# Patient Record
Sex: MALE | Race: WHITE | NOT HISPANIC OR LATINO | Employment: FULL TIME | URBAN - NONMETROPOLITAN AREA
[De-identification: names, ages, dates, MRNs, and addresses within clinical notes are randomized per-mention and may not be internally consistent; named-entity substitution may affect disease eponyms.]

---

## 2019-05-12 ENCOUNTER — HOSPITAL ENCOUNTER (OUTPATIENT)
Dept: GENERAL RADIOLOGY | Facility: HOSPITAL | Age: 34
Discharge: HOME | End: 2019-05-12
Admitting: NURSE PRACTITIONER

## 2019-05-12 ENCOUNTER — OFFICE VISIT (OUTPATIENT)
Dept: RETAIL CLINIC | Facility: CLINIC | Age: 34
End: 2019-05-12

## 2019-05-12 ENCOUNTER — TELEPHONE (OUTPATIENT)
Dept: RETAIL CLINIC | Facility: CLINIC | Age: 34
End: 2019-05-12

## 2019-05-12 VITALS
BODY MASS INDEX: 42.62 KG/M2 | TEMPERATURE: 98.5 F | DIASTOLIC BLOOD PRESSURE: 76 MMHG | SYSTOLIC BLOOD PRESSURE: 120 MMHG | RESPIRATION RATE: 18 BRPM | HEART RATE: 96 BPM | OXYGEN SATURATION: 97 % | WEIGHT: 297 LBS

## 2019-05-12 DIAGNOSIS — S89.92XA KNEE INJURY, LEFT, INITIAL ENCOUNTER: Primary | ICD-10-CM

## 2019-05-12 DIAGNOSIS — S89.92XA KNEE INJURY, LEFT, INITIAL ENCOUNTER: ICD-10-CM

## 2019-05-12 PROCEDURE — 99213 OFFICE O/P EST LOW 20 MIN: CPT | Performed by: NURSE PRACTITIONER

## 2019-05-12 PROCEDURE — 73562 X-RAY EXAM OF KNEE 3: CPT

## 2019-05-12 NOTE — PROGRESS NOTES
Knee Injury      Subjective   Alen Rosales is a 34 y.o. male.     Knee Pain    Incident onset: Last night  The incident occurred at home. The injury mechanism was a fall (kicked up right leg in the air it buckled and then fell on left knee, causing knee cap dislocation ). The pain is present in the left knee. The patient is experiencing no pain. Pertinent negatives include no inability to bear weight. He reports no foreign bodies present. The symptoms are aggravated by movement and weight bearing. Treatments tried: Pushed knee cap back into place.   The treatment provided moderate relief.    Reports that he has minimal pain when walking but difficulty bearing weight due to limiting mobility.  See ROS.      There is no problem list on file for this patient.      No Known Allergies     Current Outpatient Medications on File Prior to Visit   Medication Sig Dispense Refill   • ibuprofen (ADVIL,MOTRIN) 200 MG tablet Take 200 mg by mouth Every 6 (Six) Hours As Needed for mild pain (1-3).     • [DISCONTINUED] amoxicillin-clavulanate (AUGMENTIN) 875-125 MG per tablet Take 1 tablet by mouth 2 (Two) Times a Day. 20 tablet 0   • [DISCONTINUED] azithromycin (ZITHROMAX) 250 MG tablet Take 1 tablet by mouth Daily. Take 2 tablets the first day, then 1 tablet daily for 4 days. 6 tablet 0   • [DISCONTINUED] benzonatate (TESSALON) 200 MG capsule Take 1 capsule by mouth 3 (Three) Times a Day As Needed for cough. 15 capsule 3     No current facility-administered medications on file prior to visit.        Past Medical History:   Diagnosis Date   • Patient denies medical problems        Past Surgical History:   Procedure Laterality Date   • NO PAST SURGERIES         Family History   Problem Relation Age of Onset   • Hypertension Mother    • Diabetes Mother    • Lung cancer Father    • Coronary artery disease Father    • Hyperlipidemia Father    • Hypertension Father    • Drug abuse Brother    • Hypertension Brother        Social History      Socioeconomic History   • Marital status: Single     Spouse name: Not on file   • Number of children: Not on file   • Years of education: Not on file   • Highest education level: Not on file   Tobacco Use   • Smoking status: Never Smoker   • Smokeless tobacco: Never Used   Substance and Sexual Activity   • Alcohol use: Yes     Alcohol/week: 0.6 oz     Types: 1 Shots of liquor per week   • Drug use: No   • Sexual activity: Defer       Travel:  No recent travel within the last 21 days outside the [place] Denies recent travel to one of the following [place]  Countries:  [place], [place], [place], or [place].  Denies contact with anyone who has traveled to one of the following [place]  Countries: [place], [place], [place], or [place] within the last 21 days and is known or suspected to have Ebola.  Denies having had any contact with the human remains, blood or any bodily fluids of someone who is known or suspected to have Ebola within the last 21 days.     Review of Systems   Constitutional: Negative.    HENT: Negative.    Respiratory: Negative.    Cardiovascular: Negative.    Gastrointestinal: Negative.    Musculoskeletal: Positive for arthralgias (left knee ), gait problem and joint swelling (left knee).   Skin: Negative for wound.   Neurological: Negative for dizziness, light-headedness and headaches.        No head injury        /76 (BP Location: Right arm, Patient Position: Sitting, Cuff Size: Large Adult)   Pulse 96   Temp 98.5 °F (36.9 °C) (Temporal)   Resp 18   Wt 135 kg (297 lb)   SpO2 97%   BMI 42.62 kg/m²     Objective   Physical Exam   Constitutional: He is oriented to person, place, and time. He appears well-developed and well-nourished. He is cooperative. He does not appear ill. No distress.   HENT:   Head: Normocephalic.   Cardiovascular: Normal rate, regular rhythm, normal heart sounds, intact distal pulses and normal pulses.   Pulses:       Dorsalis pedis pulses are 2+ on the  right side, and 2+ on the left side.        Posterior tibial pulses are 2+ on the right side, and 2+ on the left side.   Pulses intact bilateral lower extremities, feet cool bilaterally    Pulmonary/Chest: Effort normal and breath sounds normal. No stridor. He has no decreased breath sounds. He has no wheezes. He has no rhonchi. He has no rales.   Musculoskeletal:        Left knee: He exhibits decreased range of motion and swelling (trace ). He exhibits no ecchymosis, no deformity, no laceration, no erythema, normal patellar mobility and no bony tenderness. Tenderness found. Medial joint line and MCL tenderness noted.        Legs:  Positive for pain with [facility] test left knee, no clicking or clunking felt/heard    Neurological: He is alert and oriented to person, place, and time. He has normal strength. He exhibits normal muscle tone. Gait (favoring right leg ) abnormal.   Skin: Skin is warm, dry and intact. No rash noted.       Assessment/Plan   Alen was seen today for knee injury.    Diagnoses and all orders for this visit:    Knee injury, left, initial encounter  -     Cancel: XR Knee 1 or 2 View Left; Future    Advised patient to rest, use Ice today, compression and elevation.  Will call when xray results become available.  I'm concerned about an meniscal tear.  Advised patient to follow up with PCP for referral to orthopedist tomorrow.  If pain worsens please see ER care.  Patient verbalized understanding of instructions given today.  Visit summary provided.  Questions/concerns addressed.        Return with PCP tomorrow as discussed and/or ER if pain worsens.  .

## 2019-05-12 NOTE — TELEPHONE ENCOUNTER
Called patient with preliminary xray examination results: unremarkable left knee xray.  Advised him to contact PCP for referral to orthopedics in the morning.

## 2019-05-12 NOTE — PATIENT INSTRUCTIONS
Knee Dislocation  Knee dislocation is when the bones that make up the knee joint move out of their normal position. Usually, if your knee is dislocated, at least two of the strong cords that hold your bones in place (ligaments) are also damaged. Knee dislocation is often caused by a sports accident or a car accident.  This is a serious injury. Your doctor needs to put the knee bones back in place right away. This may be done with or without surgery.  Follow these instructions at home:  If you have a splint:  · Wear it as told by your doctor.  · Take it off only as told by your doctor.  · Loosen it if:  ? Your foot or toes become numb and tingle.  ? Your foot or toes turn cold and blue.  · Keep it clean and dry.  Bathing  · Do not take baths, swim, or use a hot tub until your doctor says that you can. Ask your doctor if you can take showers. You may only be allowed to take sponge baths.  · If your doctor says that taking baths and showers is okay, cover the splint with a plastic bag. Do not let the splint get wet.  Managing pain, stiffness, and swelling  · If told, put ice on the injured area.  ? Put ice in a plastic bag.  ? Place a towel between your skin and the bag.  ? Leave the ice on for 20 minutes, 2-3 times per day.  · Move your toes often to avoid stiffness and to lessen swelling.  · Raise (elevate) the injured area above the level of your heart while you are sitting or lying down.  Activity  · Do not use the injured leg to support your body weight until your doctor says that you can. Use crutches or other devices to help you walk as told by your doctor.  · Return to your normal activities as told by your doctor. Ask your doctor what activities are safe for you.  · Avoid hard (strenuous) activities for as long as told by your doctor.  General instructions  · Take over-the-counter and prescription medicines only as told by your doctor.  · Do not drive or use heavy machinery while taking prescription pain  medicine.  Contact a doctor if:  · Your pain becomes worse, not better.  Get help right away if:  · You have very bad pain.  · You begin to lose feeling in your foot, and adjusting your splint does not help.  · You cannot move your ankle or toes.  · Your foot or ankle turns color or feels cold.  · You have chest pain or shortness of breath.  This information is not intended to replace advice given to you by your health care provider. Make sure you discuss any questions you have with your health care provider.  Document Released: 08/29/2012 Document Revised: 06/07/2018 Document Reviewed: 04/11/2016  [employer] Interactive Patient Education © 2019 [employer]

## 2019-05-13 ENCOUNTER — OFFICE VISIT (OUTPATIENT)
Dept: ORTHOPEDIC SURGERY | Facility: CLINIC | Age: 34
End: 2019-05-13

## 2019-05-13 VITALS — WEIGHT: 302 LBS | BODY MASS INDEX: 43.23 KG/M2 | RESPIRATION RATE: 18 BRPM | HEIGHT: 70 IN

## 2019-05-13 DIAGNOSIS — S83.005A PATELLAR DISLOCATION, LEFT, INITIAL ENCOUNTER: Primary | ICD-10-CM

## 2019-05-13 PROCEDURE — 99203 OFFICE O/P NEW LOW 30 MIN: CPT | Performed by: ORTHOPAEDIC SURGERY

## 2019-05-13 RX ORDER — MELOXICAM 15 MG/1
15 TABLET ORAL DAILY
Qty: 30 TABLET | Refills: 1 | Status: SHIPPED | OUTPATIENT
Start: 2019-05-13 | End: 2019-12-16

## 2019-05-13 NOTE — PROGRESS NOTES
Subjective   Patient ID: Alen Rosales is a 34 y.o. male  Pain of the Left Knee (Patient states he fell at a friends house on 05/11/19, he states he noticed when he fell that his knee cap was more on the left side so he pushed it back. He states he went to [facility] urgent care yesterday and was told there was no fracture.)             History of Present Illness  Presents with pain swelling and tenderness to the anterior aspect left knee after attempting a kick maneuver felt his patella go out of place he fell on the floor at the manually reduce it ever since has had pain and swelling anterolaterally at the knee also pain to medial patellofemoral area.  Denies history of prior giving way locking or other mechanical issues, no current hip back pain or other paresthesias.  Has been amatory with use of a crutch initial x-rays were negative for fracture but suspicion was raised as to soft tissue injury.      Review of Systems   Constitutional: Negative for fever.   HENT: Negative for voice change.    Eyes: Negative for visual disturbance.   Respiratory: Negative for shortness of breath.    Cardiovascular: Negative for chest pain.   Gastrointestinal: Negative for abdominal distention and abdominal pain.   Genitourinary: Negative for dysuria.   Musculoskeletal: Positive for arthralgias. Negative for gait problem and joint swelling.   Skin: Negative for rash.   Neurological: Negative for speech difficulty.   Hematological: Does not bruise/bleed easily.   Psychiatric/Behavioral: Negative for confusion.       Past Medical History:   Diagnosis Date   • Patient denies medical problems         Past Surgical History:   Procedure Laterality Date   • NO PAST SURGERIES         Family History   Problem Relation Age of Onset   • Hypertension Mother    • Diabetes Mother    • Lung cancer Father    • Coronary artery disease Father    • Hyperlipidemia Father    • Hypertension Father    • Drug abuse Brother    • Hypertension Brother        Social  "History     Socioeconomic History   • Marital status:      Spouse name: Not on file   • Number of children: Not on file   • Years of education: Not on file   • Highest education level: Not on file   Tobacco Use   • Smoking status: Never Smoker   • Smokeless tobacco: Never Used   Substance and Sexual Activity   • Alcohol use: Yes     Alcohol/week: 0.6 oz     Types: 1 Shots of liquor per week   • Drug use: No   • Sexual activity: Defer       I have reviewed all of the above social hx, family hx, surgical hx, medications, allergies & ROS and confirm that it is accurate.    No Known Allergies      Current Outpatient Medications:   •  meloxicam (MOBIC) 15 MG tablet, Take 1 tablet by mouth Daily., Disp: 30 tablet, Rfl: 1    Objective   Resp 18   Ht 177.8 cm (70\")   Wt (!) 137 kg (302 lb)   BMI 43.33 kg/m²    Physical Exam  Constitutional: Patient is oriented to person, place, and time. Patient appears well-developed and well-nourished.   HENT:Head: Normocephalic and atraumatic.   Eyes: EOM are normal. Pupils are equal, round, and reactive to light.   Neck: Normal range of motion. Neck supple.   Cardiovascular: Normal rate.    Pulmonary/Chest: Effort normal and breath sounds normal.   Abdominal: Soft.   Neurological: Patient is alert and oriented to person, place, and time.   Skin: Skin is warm and dry.   Psychiatric: Patient has a normal mood and affect.   Nursing note and vitals reviewed.       [unfilled]   Left knee: 3+ effusion positive tenderness over the MPFL region on the medial aspect of the patella but no ecchymosis, extensor mechanism intact full active extension no tenderness at the quad or patellar tendon areas, minimal lateral patellofemoral pain, negative Lockman sign negative Remy sign calf supple no edema in the ankle and no pain with internal ex rotation of the left hip    Assessment/Plan   Review of Radiographic Studies:    Radiographic images today of affected area I personally viewed and " showed no sign of acute fracture or dislocation.  No new imaging done today.      Procedures     Alen was seen today for pain.    Diagnoses and all orders for this visit:    Patellar dislocation, left, initial encounter  -     Ambulatory Referral to Physical Therapy Evaluate and treat    Other orders  -     meloxicam (MOBIC) 15 MG tablet; Take 1 tablet by mouth Daily.       Physical therapy referral given and Use brace as instructed      Recommendations/Plan:   Work/Activity Status: Unable to return to work until next evaluation    Patient agreeable to call or return sooner for any concerns.             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

## 2019-05-23 ENCOUNTER — OFFICE VISIT (OUTPATIENT)
Dept: ORTHOPEDIC SURGERY | Facility: CLINIC | Age: 34
End: 2019-05-23

## 2019-05-23 VITALS — WEIGHT: 302 LBS | HEIGHT: 70 IN | RESPIRATION RATE: 18 BRPM | BODY MASS INDEX: 43.23 KG/M2

## 2019-05-23 DIAGNOSIS — S83.005D PATELLAR DISLOCATION, LEFT, SUBSEQUENT ENCOUNTER: Primary | ICD-10-CM

## 2019-05-23 PROCEDURE — 99213 OFFICE O/P EST LOW 20 MIN: CPT | Performed by: ORTHOPAEDIC SURGERY

## 2019-05-23 NOTE — PROGRESS NOTES
Subjective   Patient ID: Alen Rosales is a 34 y.o. male  Follow-up of the Left Knee (Patient is here today for a follow up on his left knee, he states he is still doing therapy and having trouble squatting, but states he is feeling better. His pain level is 3-4/10.)             History of Present Illness  Overall pain level much improved had one episode earlier this week when he was getting in to his wife's car when he caught his foot on the floor mat felt some pain swelling occurred now is back to his baseline prior to that episode, feels relief using his bracing has been compliant with therapy taking his anti-inflammatory less often as the pain has subsided.      Review of Systems   Constitutional: Negative for fever.   HENT: Negative for voice change.    Eyes: Negative for visual disturbance.   Respiratory: Negative for shortness of breath.    Cardiovascular: Negative for chest pain.   Gastrointestinal: Negative for abdominal pain.   Genitourinary: Negative for dysuria.   Musculoskeletal: Positive for arthralgias. Negative for gait problem and joint swelling.   Skin: Negative for rash.   Neurological: Negative for speech difficulty.   Hematological: Does not bruise/bleed easily.   Psychiatric/Behavioral: Negative for confusion.       Past Medical History:   Diagnosis Date   • Patient denies medical problems         Past Surgical History:   Procedure Laterality Date   • NO PAST SURGERIES         Family History   Problem Relation Age of Onset   • Hypertension Mother    • Diabetes Mother    • Lung cancer Father    • Coronary artery disease Father    • Hyperlipidemia Father    • Hypertension Father    • Drug abuse Brother    • Hypertension Brother        Social History     Socioeconomic History   • Marital status:      Spouse name: Not on file   • Number of children: Not on file   • Years of education: Not on file   • Highest education level: Not on file   Tobacco Use   • Smoking status: Never Smoker   •  "Smokeless tobacco: Never Used   Substance and Sexual Activity   • Alcohol use: Yes     Alcohol/week: 0.6 oz     Types: 1 Shots of liquor per week   • Drug use: No   • Sexual activity: Defer       I have reviewed all of the above social hx, family hx, surgical hx, medications, allergies & ROS and confirm that it is accurate.    No Known Allergies      Current Outpatient Medications:   •  meloxicam (MOBIC) 15 MG tablet, Take 1 tablet by mouth Daily., Disp: 30 tablet, Rfl: 1    Objective   Resp 18   Ht 177.8 cm (70\")   Wt (!) 137 kg (302 lb)   BMI 43.33 kg/m²    Physical Exam  Constitutional: Patient is oriented to person, place, and time. Patient appears well-developed and well-nourished.   HENT:Head: Normocephalic and atraumatic.   Eyes: EOM are normal. Pupils are equal, round, and reactive to light.   Neck: Normal range of motion. Neck supple.   Cardiovascular: Normal rate.    Pulmonary/Chest: Effort normal and breath sounds normal.   Abdominal: Soft.   Neurological: Patient is alert and oriented to person, place, and time.   Skin: Skin is warm and dry.   Psychiatric: Patient has a normal mood and affect.   Nursing note and vitals reviewed.       [unfilled]   Left knee: 1-2+ effusion slight tenderness over the lateral patellofemoral area full active knee extension no medial lateral joint line pain neurovascularly is intact  Assessment/Plan   Review of Radiographic Studies:    No new imaging done today.      Procedures     Alen was seen today for follow-up.    Diagnoses and all orders for this visit:    Patellar dislocation, left, subsequent encounter       Physical therapy referral given      Recommendations/Plan:   Work/Activity Status: Unable to return to work until next evaluation    Patient agreeable to call or return sooner for any concerns.             Impression:  Improving left anterior knee pain status post patellar dislocation  Plan:  Continue therapy icing use of brace recheck 2 weeks consider " release to work at [employer] at that time

## 2019-05-31 ENCOUNTER — TELEPHONE (OUTPATIENT)
Dept: ORTHOPEDIC SURGERY | Facility: CLINIC | Age: 34
End: 2019-05-31

## 2019-05-31 NOTE — TELEPHONE ENCOUNTER
Spoke with patients wife to let her know his papers for work was ready to be picked up and that there was a fee to be paid.

## 2019-06-06 ENCOUNTER — OFFICE VISIT (OUTPATIENT)
Dept: ORTHOPEDIC SURGERY | Facility: CLINIC | Age: 34
End: 2019-06-06

## 2019-06-06 VITALS — HEIGHT: 70 IN | BODY MASS INDEX: 43.23 KG/M2 | WEIGHT: 302 LBS | RESPIRATION RATE: 18 BRPM

## 2019-06-06 DIAGNOSIS — S83.005D PATELLAR DISLOCATION, LEFT, SUBSEQUENT ENCOUNTER: Primary | ICD-10-CM

## 2019-06-06 PROCEDURE — 99213 OFFICE O/P EST LOW 20 MIN: CPT | Performed by: ORTHOPAEDIC SURGERY

## 2019-06-06 NOTE — PROGRESS NOTES
Subjective   Patient ID: Alen Rosales is a 34 y.o. male  Follow-up and Pain of the Left Knee (Patient is here today for a follow up on his knee pain, he states his knee pain is more noticeable at night but comes and goes. Patient also states his knee is feeling better and feels more stable.)             History of Present Illness  He returns very satisfied with relief of his left anterior knee pain only has rarely occasional popping with pain inferolateral patellofemoral area.  No falling no giving way no locking or buckling.  He uses his knee sleeve is eager to go back to his full-time work at [employer].      Review of Systems   Constitutional: Negative for fever.   HENT: Negative for voice change.    Eyes: Negative for visual disturbance.   Respiratory: Negative for shortness of breath.    Cardiovascular: Negative for chest pain.   Gastrointestinal: Negative for abdominal pain.   Genitourinary: Negative for dysuria.   Musculoskeletal: Positive for arthralgias. Negative for gait problem and joint swelling.   Skin: Negative for rash.   Neurological: Negative for speech difficulty.   Hematological: Does not bruise/bleed easily.   Psychiatric/Behavioral: Negative for confusion.       Past Medical History:   Diagnosis Date   • Patient denies medical problems         Past Surgical History:   Procedure Laterality Date   • NO PAST SURGERIES         Family History   Problem Relation Age of Onset   • Hypertension Mother    • Diabetes Mother    • Lung cancer Father    • Coronary artery disease Father    • Hyperlipidemia Father    • Hypertension Father    • Drug abuse Brother    • Hypertension Brother        Social History     Socioeconomic History   • Marital status:      Spouse name: Not on file   • Number of children: Not on file   • Years of education: Not on file   • Highest education level: Not on file   Tobacco Use   • Smoking status: Never Smoker   • Smokeless tobacco: Never Used   Substance and Sexual Activity  "  • Alcohol use: Yes     Alcohol/week: 0.6 oz     Types: 1 Shots of liquor per week   • Drug use: No   • Sexual activity: Defer       I have reviewed all of the above social hx, family hx, surgical hx, medications, allergies & ROS and confirm that it is accurate.    No Known Allergies      Current Outpatient Medications:   •  meloxicam (MOBIC) 15 MG tablet, Take 1 tablet by mouth Daily., Disp: 30 tablet, Rfl: 1    Objective   Resp 18   Ht 177.8 cm (70\")   Wt (!) 137 kg (302 lb)   BMI 43.33 kg/m²    Physical Exam  Constitutional: Patient is oriented to person, place, and time. Patient appears well-developed and well-nourished.   HENT:Head: Normocephalic and atraumatic.   Eyes: EOM are normal. Pupils are equal, round, and reactive to light.   Neck: Normal range of motion. Neck supple.   Cardiovascular: Normal rate.    Pulmonary/Chest: Effort normal and breath sounds normal.   Abdominal: Soft.   Neurological: Patient is alert and oriented to person, place, and time.   Skin: Skin is warm and dry.   Psychiatric: Patient has a normal mood and affect.   Nursing note and vitals reviewed.       [unfilled]   Left knee: Trace effusion minimal patellofemoral crepitus some tenderness at the inferolateral patellar pole good patellar mobility with some pain with medial translation but does not appear excessively tight in the lateral retinacular area.  Lateral joint line nontender Remy sign negative for lateral joint line pain, range of motion is full strength normal no instability.  Assessment/Plan   Review of Radiographic Studies:    No new imaging done today.      Procedures     Alen was seen today for follow-up and pain.    Diagnoses and all orders for this visit:    Patellar dislocation, left, subsequent encounter       Work status form completed and provided to patient      Recommendations/Plan:   Work/Activity Status: May perform usual activities as tolerated    Patient agreeable to call or return sooner for any " concerns.             Impression:  History of patellar dislocation with improved left knee anterior patellofemoral mechanics less pain slight swelling inferolateral patellofemoral area  Plan:  Home exercise released to regular work duty recheck in 2 to 3 months if still symptomatic order MRI at that time

## 2019-12-16 ENCOUNTER — OFFICE VISIT (OUTPATIENT)
Dept: RETAIL CLINIC | Facility: CLINIC | Age: 34
End: 2019-12-16

## 2019-12-16 VITALS
WEIGHT: 307 LBS | RESPIRATION RATE: 18 BRPM | SYSTOLIC BLOOD PRESSURE: 122 MMHG | DIASTOLIC BLOOD PRESSURE: 70 MMHG | OXYGEN SATURATION: 98 % | TEMPERATURE: 97.1 F | HEART RATE: 83 BPM | BODY MASS INDEX: 44.05 KG/M2

## 2019-12-16 DIAGNOSIS — J01.40 ACUTE NON-RECURRENT PANSINUSITIS: Primary | ICD-10-CM

## 2019-12-16 PROCEDURE — 99213 OFFICE O/P EST LOW 20 MIN: CPT | Performed by: NURSE PRACTITIONER

## 2019-12-16 RX ORDER — AMOXICILLIN AND CLAVULANATE POTASSIUM 875; 125 MG/1; MG/1
1 TABLET, FILM COATED ORAL 2 TIMES DAILY
Qty: 20 TABLET | Refills: 0 | Status: SHIPPED | OUTPATIENT
Start: 2019-12-16 | End: 2019-12-26

## 2019-12-16 RX ORDER — BROMPHENIRAMINE MALEATE, PSEUDOEPHEDRINE HYDROCHLORIDE, AND DEXTROMETHORPHAN HYDROBROMIDE 2; 30; 10 MG/5ML; MG/5ML; MG/5ML
10 SYRUP ORAL 4 TIMES DAILY PRN
Qty: 150 ML | Refills: 0 | Status: SHIPPED | OUTPATIENT
Start: 2019-12-16 | End: 2019-12-21

## 2019-12-16 RX ORDER — FLUTICASONE PROPIONATE 50 MCG
1 SPRAY, SUSPENSION (ML) NASAL DAILY
Qty: 1 BOTTLE | Refills: 1 | Status: SHIPPED | OUTPATIENT
Start: 2019-12-16 | End: 2019-12-30

## 2019-12-16 NOTE — PATIENT INSTRUCTIONS
Sinusitis, Adult  Sinusitis is soreness and swelling (inflammation) of your sinuses. Sinuses are hollow spaces in the bones around your face. They are located:  · Around your eyes.  · In the middle of your forehead.  · Behind your nose.  · In your cheekbones.  Your sinuses and nasal passages are lined with a fluid called mucus. Mucus drains out of your sinuses. Swelling can trap mucus in your sinuses. This lets germs (bacteria, virus, or fungus) grow, which leads to infection. Most of the time, this condition is caused by a virus.  What are the causes?  This condition is caused by:  · Allergies.  · Asthma.  · Germs.  · Things that block your nose or sinuses.  · Growths in the nose (nasal polyps).  · Chemicals or irritants in the air.  · Fungus (rare).  What increases the risk?  You are more likely to develop this condition if:  · You have a weak body defense system (immune system).  · You do a lot of swimming or diving.  · You use nasal sprays too much.  · You smoke.  What are the signs or symptoms?  The main symptoms of this condition are pain and a feeling of pressure around the sinuses. Other symptoms include:  · Stuffy nose (congestion).  · Runny nose (drainage).  · Swelling and warmth in the sinuses.  · Headache.  · Toothache.  · A cough that may get worse at night.  · Mucus that collects in the throat or the back of the nose (postnasal drip).  · Being unable to smell and taste.  · Being very tired (fatigue).  · A fever.  · Sore throat.  · Bad breath.  How is this diagnosed?  This condition is diagnosed based on:  · Your symptoms.  · Your medical history.  · A physical exam.  · Tests to find out if your condition is short-term (acute) or long-term (chronic). Your doctor may:  ? Check your nose for growths (polyps).  ? Check your sinuses using a tool that has a light (endoscope).  ? Check for allergies or germs.  ? Do imaging tests, such as an MRI or CT scan.  How is this treated?  Treatment for this condition  depends on the cause and whether it is short-term or long-term.  · If caused by a virus, your symptoms should go away on their own within 10 days. You may be given medicines to relieve symptoms. They include:  ? Medicines that shrink swollen tissue in the nose.  ? Medicines that treat allergies (antihistamines).  ? A spray that treats swelling of the nostrils.   ? Rinses that help get rid of thick mucus in your nose (nasal saline washes).  · If caused by bacteria, your doctor may wait to see if you will get better without treatment. You may be given antibiotic medicine if you have:  ? A very bad infection.  ? A weak body defense system.  · If caused by growths in the nose, you may need to have surgery.  Follow these instructions at home:  Medicines  · Take, use, or apply over-the-counter and prescription medicines only as told by your doctor. These may include nasal sprays.  · If you were prescribed an antibiotic medicine, take it as told by your doctor. Do not stop taking the antibiotic even if you start to feel better.  Hydrate and humidify    · Drink enough water to keep your pee (urine) pale yellow.  · Use a cool mist humidifier to keep the humidity level in your home above 50%.  · Breathe in steam for 10-15 minutes, 3-4 times a day, or as told by your doctor. You can do this in the bathroom while a hot shower is running.  · Try not to spend time in cool or dry air.  Rest  · Rest as much as you can.  · Sleep with your head raised (elevated).  · Make sure you get enough sleep each night.  General instructions    · Put a warm, moist washcloth on your face 3-4 times a day, or as often as told by your doctor. This will help with discomfort.  · Wash your hands often with soap and water. If there is no soap and water, use hand .  · Do not smoke. Avoid being around people who are smoking (secondhand smoke).  · Keep all follow-up visits as told by your doctor. This is important.  Contact a doctor if:  · You  have a fever.  · Your symptoms get worse.  · Your symptoms do not get better within 10 days.  Get help right away if:  · You have a very bad headache.  · You cannot stop throwing up (vomiting).  · You have very bad pain or swelling around your face or eyes.  · You have trouble seeing.  · You feel confused.  · Your neck is stiff.  · You have trouble breathing.  Summary  · Sinusitis is swelling of your sinuses. Sinuses are hollow spaces in the bones around your face.  · This condition is caused by tissues in your nose that become inflamed or swollen. This traps germs. These can lead to infection.  · If you were prescribed an antibiotic medicine, take it as told by your doctor. Do not stop taking it even if you start to feel better.  · Keep all follow-up visits as told by your doctor. This is important.  This information is not intended to replace advice given to you by your health care provider. Make sure you discuss any questions you have with your health care provider.  Document Released: 06/05/2009 Document Revised: 05/20/2019 Document Reviewed: 05/20/2019  [employer] Patient Education © 2019 [employer]

## 2019-12-16 NOTE — PROGRESS NOTES
URI      Subjective   Alen Rosales is a 34 y.o. male.     URI    This is a new problem. Episode onset: Started Wednesday worsened Saturday  The problem has been gradually worsening. There has been no fever. Associated symptoms include congestion, coughing, headaches, a rash (itchy under nose from blowing ), rhinorrhea, sinus pain, a sore throat (mildly improved ) and wheezing. Pertinent negatives include no abdominal pain, chest pain, diarrhea, ear pain, nausea, sneezing or vomiting. Treatments tried: Mucinex, sleeping inclined, Tylenol  The treatment provided mild relief.    Has not had influenza vaccine.  Career:  [employer].  See ROS.      There is no problem list on file for this patient.      No Known Allergies     Current Outpatient Medications on File Prior to Visit   Medication Sig Dispense Refill   • [DISCONTINUED] meloxicam (MOBIC) 15 MG tablet Take 1 tablet by mouth Daily. 30 tablet 1     No current facility-administered medications on file prior to visit.        Past Medical History:   Diagnosis Date   • Patient denies medical problems        Past Surgical History:   Procedure Laterality Date   • NO PAST SURGERIES         Family History   Problem Relation Age of Onset   • Hypertension Mother    • Diabetes Mother    • Lung cancer Father    • Coronary artery disease Father    • Hyperlipidemia Father    • Hypertension Father    • Drug abuse Brother    • Hypertension Brother        Social History     Socioeconomic History   • Marital status:      Spouse name: Not on file   • Number of children: Not on file   • Years of education: Not on file   • Highest education level: Not on file   Tobacco Use   • Smoking status: Never Smoker   • Smokeless tobacco: Never Used   Substance and Sexual Activity   • Alcohol use: Yes     Alcohol/week: 1.0 standard drinks     Types: 1 Shots of liquor per week   • Drug use: Never   • Sexual activity: Defer       Travel:  No recent travel within the last 21  days outside the [place] Denies recent travel to one of the following [place]  Countries:  [place], [place], [place], or [place].  Denies contact with anyone who has traveled to one of the following West  Countries: [place], [place], [place], or [place] within the last 21 days and is known or suspected to have Ebola.  Denies having had any contact with the human remains, blood or any bodily fluids of someone who is known or suspected to have Ebola within the last 21 days.     Review of Systems   Constitutional: Positive for diaphoresis and fatigue. Negative for chills and fever.   HENT: Positive for congestion, dental problem (since illness began ), nosebleeds (when blowing ), postnasal drip, rhinorrhea, sinus pressure, sinus pain and sore throat (mildly improved ). Negative for ear discharge, ear pain, mouth sores, sneezing and voice change.    Respiratory: Positive for cough and wheezing. Negative for chest tightness and shortness of breath.    Cardiovascular: Negative for chest pain.   Gastrointestinal: Negative for abdominal pain, diarrhea, nausea and vomiting.   Musculoskeletal: Negative for myalgias.   Skin: Positive for rash (itchy under nose from blowing ).   Neurological: Positive for headaches. Negative for dizziness.       /70 (BP Location: Right arm, Patient Position: Sitting, Cuff Size: Large Adult)   Pulse 83   Temp 97.1 °F (36.2 °C) (Temporal)   Resp 18   Wt (!) 139 kg (307 lb)   SpO2 98%   BMI 44.05 kg/m²     Objective   Physical Exam   Constitutional: He is oriented to person, place, and time. He appears well-developed. He is cooperative. He appears ill. No distress.   Obese      HENT:   Head: Normocephalic.   Right Ear: External ear and ear canal normal. Tympanic membrane is bulging. Tympanic membrane is not injected, not scarred, not perforated, not erythematous and not retracted. A middle ear effusion (moderate serous ) is present.   Left Ear: External ear and ear canal  normal. Tympanic membrane is bulging. Tympanic membrane is not injected, not scarred, not perforated, not erythematous and not retracted. A middle ear effusion (moderate serous ) is present.   Nose: Mucosal edema (boggy ) and rhinorrhea present. Right sinus exhibits maxillary sinus tenderness and frontal sinus tenderness. Left sinus exhibits maxillary sinus tenderness and frontal sinus tenderness.   Mouth/Throat: Uvula is midline. Mucous membranes are dry. No posterior oropharyngeal edema or posterior oropharyngeal erythema. Tonsils are 1+ on the right. Tonsils are 1+ on the left. No tonsillar exudate.   Cardiovascular: Normal rate, regular rhythm and normal heart sounds.   Pulmonary/Chest: Effort normal and breath sounds normal. No stridor. He has no decreased breath sounds. He has no wheezes. He has no rhonchi. He has no rales.   Lymphadenopathy:        Head (right side): No tonsillar, no preauricular, no posterior auricular and no occipital adenopathy present.        Head (left side): No tonsillar, no preauricular, no posterior auricular and no occipital adenopathy present.     He has no cervical adenopathy.   Neurological: He is alert and oriented to person, place, and time.   Skin: Skin is warm and intact. No rash noted. He is diaphoretic.       Assessment/Plan   Alen was seen today for uri.    Diagnoses and all orders for this visit:    Acute non-recurrent pansinusitis  -     amoxicillin-clavulanate (AUGMENTIN) 875-125 MG per tablet; Take 1 tablet by mouth 2 (Two) Times a Day for 10 days.  -     fluticasone (FLONASE) 50 MCG/ACT nasal spray; 1 spray into the nostril(s) as directed by provider Daily for 14 days.  -     brompheniramine-pseudoephedrine-DM 30-2-10 MG/5ML syrup; Take 10 mL by mouth 4 (Four) Times a Day As Needed for Congestion or Cough for up to 5 days.  -     Ambulatory Referral to Family Practice    Rest, increase water intake, neti pot PRN, steam showers, warm facial compresses, humidifier in  room.  Tylenol and/or Motrin for pain/fever.  Follow up with PCP if symptoms worsen/do not improve.  Start a probiotic while taking the Antibiotic.  Patient verbalized understanding of instructions given.  Visit summary provided.  Questions/concerns addressed today.        Return if symptoms worsen or fail to improve with PCP .

## 2020-05-28 ENCOUNTER — TELEMEDICINE (OUTPATIENT)
Dept: BARIATRICS/WEIGHT MGMT | Facility: CLINIC | Age: 35
End: 2020-05-28

## 2020-05-28 VITALS — HEIGHT: 70 IN | BODY MASS INDEX: 43.95 KG/M2 | WEIGHT: 307 LBS

## 2020-05-28 DIAGNOSIS — R06.09 DYSPNEA ON EXERTION: ICD-10-CM

## 2020-05-28 DIAGNOSIS — R10.13 DYSPEPSIA: ICD-10-CM

## 2020-05-28 DIAGNOSIS — R53.83 FATIGUE, UNSPECIFIED TYPE: Primary | ICD-10-CM

## 2020-05-28 DIAGNOSIS — E66.01 MORBID OBESITY (HCC): ICD-10-CM

## 2020-05-28 PROCEDURE — 99203 OFFICE O/P NEW LOW 30 MIN: CPT | Performed by: PHYSICIAN ASSISTANT

## 2020-05-28 NOTE — PROGRESS NOTES
[facility] BARIATRIC SURGERY  [address]  [address]  [facility] [address]  [phone]      Patient  Name:  Alen Rosales  :  1985      Date of Visit: 2020      Chief Complaint:  weight gain; unable to maintain weight loss    History of Present Illness:  Alen Rosales is a 35 y.o. male who presents today for evaluation, education and consultation regarding metabolic and bariatric surgery. The patient is interested in sleeve gastrectomy with Dr. Jean.     Wife - Nahun Hartman - s/p LSG w/ GDW, doing really well, has inspired him to have surgery.  Relatively unremarkable medical hx, but w/ strong fhx health issues.  Wants to get his weight under control so that he can live a long/healthy life.     Alen has been overweight for at least 28 years, has been 35 pounds or more overweight for at least 25 years, has been 100 pounds or more overweight for 15 or more years and started dieting at age 25.  Previous diet attempts include: High Protein, Low Carbohydrate and Karely's Diet.  The most weight Alen lost was 35 pounds w/ exercise but was unable to maintain that weight loss.  His maximum lifetime weight is 330 pounds.    As above, patient has been overweight for many years, with numerous unsuccessful dietary/weight loss attempts.  He now has obesity related comorbidities and as such has decided to pursue metabolic and bariatric surgery.  All past medical, surgical, social and family history have been obtained and discussed today, as pertinent to bariatric surgery.     Past Medical History:   Diagnosis Date   • Dyspepsia    • Dyspnea on exertion    • Fatigue    • Morbid obesity (CMS/HCC)      Past Surgical History:   Procedure Laterality Date   • NO PAST SURGERIES         No Known Allergies     No current outpatient medications on file.    Social History     Socioeconomic History   • Marital status:      Spouse name: Not on file   • Number of children: Not on file   • Years  of education: Not on file   • Highest education level: Not on file   Tobacco Use   • Smoking status: Never Smoker   • Smokeless tobacco: Never Used   Substance and Sexual Activity   • Alcohol use: Not Currently     Alcohol/week: 1.0 standard drinks     Types: 1 Shots of liquor per week     Comment: socially   • Drug use: Never   • Sexual activity: Defer   Social History Narrative    Lives in [place], [place] with wife Nahun Hartman.  Works full time at [employer] as a /  for 15yrs.      Family History   Problem Relation Age of Onset   • Hypertension Mother    • Diabetes Mother    • Obesity Mother    • Sleep apnea Mother    • Coronary artery disease Father    • Hyperlipidemia Father    • Hypertension Father    • Heart attack Father    • Heart disease Father    • Drug abuse Brother    • Hypertension Brother    • COPD Maternal Grandmother    • Obesity Maternal Grandmother    • Diabetes Maternal Grandmother    • Hypertension Maternal Grandmother    • Heart disease Maternal Grandmother    • Heart attack Maternal Grandmother    • Kidney cancer Maternal Grandmother    • Cancer Maternal Grandmother         Bladder Cancer   • Hypertension Maternal Grandfather    • Stroke Maternal Grandfather    • Stroke Paternal Grandmother    • Hypertension Paternal Grandmother    • Stroke Paternal Grandfather    • Hypertension Paternal Grandfather    • Diabetes Paternal Grandfather        Review of Systems:  Constitutional:  reports fatigue, weight gain and denies fevers, chills.  HEENT:  denies headache, ear pain or loss of hearing, blurred or double vision, nasal discharge or sore throat.  Cardiovascular:  denies HTN, hx heart disease, chest pain, palpitations, hx DVT.  Respiratory:  denies cough , wheezing, sleep apnea, asthma, hx PE.  Gastrointestinal:  denies dysphagia, heartburn, nausea, vomiting, abdominal pain, IBS.  Genitourinary:  denies history of  frequent UTI, incontinence, hematuria, dysuria,  polyuria, polydipsia, renal insufficiency.    Musculoskeletal:  denies joint pain, fibromyalgia, arthritis and autoimmune disease.  Neurological:  denies migraines, numbness /tingling, dizziness, confusion, seizure.  Psychiatric:  denies depressed mood, hx depression, feeling anxious, hx anxiety, bipolar disorder.  Endocrine:  denies glucose intolerance, diabetes, thyroid disease.  Hematologic:  denies bruising, bleeding disorder, hx anemia, hx blood transfusion.  Skin:  denies rashes, hx MRSA.    Physical Exam:  Vital Signs:  Weight: (!) 139 kg (307 lb)   Body mass index is 44.05 kg/m².       Note: height & weight patient reported.  Limited exam d/t virtual visit during COVID pandemic    Physical Exam   Constitutional: He is oriented to person, place, and time. He appears well-developed and well-nourished. No distress.   Eyes: No scleral icterus.   Pulmonary/Chest: Effort normal.   Neurological: He is alert and oriented to person, place, and time.   Skin: He is not diaphoretic.   Psychiatric: He has a normal mood and affect.         Assessment:    Alen Rosales is a 35 y.o. male with medically complicated obesity pursuing sleeve gastrectomy.    Patient Active Problem List   Diagnosis   • Morbid obesity (CMS/HCC)   • Fatigue   • Dyspepsia   • Dyspnea on exertion       Metabolic and bariatric surgery is deemed medically necessary given current Weight: (!) 139 kg (307 lb) and Body mass index is 44.05 kg/m².    Plan:  Patient understands that bariatric surgery is not cosmetic surgery but rather a tool to help make a lifelong commitment to lifestyle changes including diet, exercise, behavior modifications, and healthy habits.  The patient has been educated today on those expected postoperative lifestyle changes.  Psychological and Nutritional consultations will be arranged prior to surgery.  Instructions on how to access [employer] (an internet based site w/ educational surgical videos) were given to the patient.  Recommended  vitamin supplementation was reviewed.  The importance of avoiding ASA/ NSAIDS/ steroids/ tobacco/ hormones/ immunomodulators perioperatively was discussed in detail.  All questions/concerns have been addressed.      Further evaluation will include: CBC, CMP, Lipids, TSH, H.Pylori UBT, EKG and CXR.    No additional clearances needed prior to surgery at this time.       Further input to follow pending the above.          Note: This was an audio and video enabled telemedicine encounter to comply with social distancing guidelines during the COVID-19 pandemic.  Consent was obtained prior to the visit.         TEX Coley

## 2020-06-19 ENCOUNTER — LAB (OUTPATIENT)
Dept: LAB | Facility: HOSPITAL | Age: 35
End: 2020-06-19

## 2020-06-19 DIAGNOSIS — R10.13 DYSPEPSIA: ICD-10-CM

## 2020-06-19 DIAGNOSIS — R06.09 DYSPNEA ON EXERTION: ICD-10-CM

## 2020-06-19 DIAGNOSIS — R53.83 FATIGUE, UNSPECIFIED TYPE: ICD-10-CM

## 2020-06-19 LAB
ALBUMIN SERPL-MCNC: 4.3 G/DL (ref 3.5–5.2)
ALBUMIN/GLOB SERPL: 1.5 G/DL
ALP SERPL-CCNC: 53 U/L (ref 39–117)
ALT SERPL W P-5'-P-CCNC: 28 U/L (ref 1–41)
ANION GAP SERPL CALCULATED.3IONS-SCNC: 9 MMOL/L (ref 5–15)
AST SERPL-CCNC: 18 U/L (ref 1–40)
BILIRUB SERPL-MCNC: 0.4 MG/DL (ref 0.2–1.2)
BUN BLD-MCNC: 23 MG/DL (ref 6–20)
BUN/CREAT SERPL: 27.1 (ref 7–25)
CALCIUM SPEC-SCNC: 9.3 MG/DL (ref 8.6–10.5)
CHLORIDE SERPL-SCNC: 102 MMOL/L (ref 98–107)
CHOLEST SERPL-MCNC: 189 MG/DL (ref 0–200)
CO2 SERPL-SCNC: 25 MMOL/L (ref 22–29)
CREAT BLD-MCNC: 0.85 MG/DL (ref 0.76–1.27)
GFR SERPL CREATININE-BSD FRML MDRD: 103 ML/MIN/1.73
GLOBULIN UR ELPH-MCNC: 2.8 GM/DL
GLUCOSE BLD-MCNC: 88 MG/DL (ref 65–99)
HDLC SERPL-MCNC: 40 MG/DL (ref 40–60)
LDLC SERPL CALC-MCNC: 120 MG/DL (ref 0–100)
LDLC/HDLC SERPL: 2.99 {RATIO}
POTASSIUM BLD-SCNC: 4.7 MMOL/L (ref 3.5–5.2)
PROT SERPL-MCNC: 7.1 G/DL (ref 6–8.5)
SODIUM BLD-SCNC: 136 MMOL/L (ref 136–145)
TRIGL SERPL-MCNC: 147 MG/DL (ref 0–150)
TSH SERPL DL<=0.05 MIU/L-ACNC: 1.11 UIU/ML (ref 0.27–4.2)
VLDLC SERPL-MCNC: 29.4 MG/DL (ref 5–40)

## 2020-06-19 PROCEDURE — 84443 ASSAY THYROID STIM HORMONE: CPT

## 2020-06-19 PROCEDURE — 85025 COMPLETE CBC W/AUTO DIFF WBC: CPT

## 2020-06-19 PROCEDURE — 80053 COMPREHEN METABOLIC PANEL: CPT

## 2020-06-19 PROCEDURE — 85007 BL SMEAR W/DIFF WBC COUNT: CPT

## 2020-06-19 PROCEDURE — 36415 COLL VENOUS BLD VENIPUNCTURE: CPT

## 2020-06-19 PROCEDURE — 80061 LIPID PANEL: CPT

## 2020-06-20 LAB
BASOPHILS # BLD MANUAL: 0.06 10*3/MM3 (ref 0–0.2)
BASOPHILS NFR BLD AUTO: 1 % (ref 0–1.5)
DEPRECATED RDW RBC AUTO: 40.9 FL (ref 37–54)
EOSINOPHIL # BLD MANUAL: 0.06 10*3/MM3 (ref 0–0.4)
EOSINOPHIL NFR BLD MANUAL: 1 % (ref 0.3–6.2)
ERYTHROCYTE [DISTWIDTH] IN BLOOD BY AUTOMATED COUNT: 13.3 % (ref 12.3–15.4)
HCT VFR BLD AUTO: 46.9 % (ref 37.5–51)
HGB BLD-MCNC: 15.4 G/DL (ref 13–17.7)
LYMPHOCYTES # BLD MANUAL: 1.72 10*3/MM3 (ref 0.7–3.1)
LYMPHOCYTES NFR BLD MANUAL: 13.4 % (ref 5–12)
LYMPHOCYTES NFR BLD MANUAL: 29.9 % (ref 19.6–45.3)
MCH RBC QN AUTO: 27.5 PG (ref 26.6–33)
MCHC RBC AUTO-ENTMCNC: 32.8 G/DL (ref 31.5–35.7)
MCV RBC AUTO: 83.6 FL (ref 79–97)
MONOCYTES # BLD AUTO: 0.77 10*3/MM3 (ref 0.1–0.9)
NEUTROPHILS # BLD AUTO: 3.14 10*3/MM3 (ref 1.7–7)
NEUTROPHILS NFR BLD MANUAL: 54.6 % (ref 42.7–76)
PLAT MORPH BLD: NORMAL
PLATELET # BLD AUTO: 198 10*3/MM3 (ref 140–450)
PMV BLD AUTO: 11.2 FL (ref 6–12)
RBC # BLD AUTO: 5.61 10*6/MM3 (ref 4.14–5.8)
RBC MORPH BLD: NORMAL
WBC MORPH BLD: NORMAL
WBC NRBC COR # BLD: 5.76 10*3/MM3 (ref 3.4–10.8)

## 2020-06-26 ENCOUNTER — OFFICE VISIT (OUTPATIENT)
Dept: INTERNAL MEDICINE | Facility: CLINIC | Age: 35
End: 2020-06-26

## 2020-06-26 VITALS
SYSTOLIC BLOOD PRESSURE: 124 MMHG | DIASTOLIC BLOOD PRESSURE: 84 MMHG | HEART RATE: 67 BPM | HEIGHT: 70 IN | OXYGEN SATURATION: 98 % | BODY MASS INDEX: 42.75 KG/M2 | WEIGHT: 298.6 LBS | TEMPERATURE: 98.4 F

## 2020-06-26 DIAGNOSIS — E66.01 MORBID OBESITY (HCC): Primary | ICD-10-CM

## 2020-06-26 PROCEDURE — 99203 OFFICE O/P NEW LOW 30 MIN: CPT | Performed by: FAMILY MEDICINE

## 2020-06-26 NOTE — PROGRESS NOTES
Alen Rosales is a 35 y.o. male.    Chief Complaint   Patient presents with   • Weight Loss       HPI   Patient presents today for weight loss monitoring for bariatric surgery.  His wife had the gastric sleeve procedure done last year.  He has been dieting and exercising with her.  He has lost about 30lbs in the process, but has not lost any weight in the last few months.  Patient is trying to eat low carb and high protein.  He is walking frequently for exercise about 1 mile a day.  He is drinking soda.  Trying to decrease soda intake. He does not eat sweets.  He tries to avoid bread.        Patient did recently have labs done and cholesterol is very minimally elevated.  BP is controlled in the office today. Glucose was within normal limits.  Patient is morbidly obese, but otherwise very healthy.  He does have a significant amount of health problems that run in his family that can be worsened by obesity and would like to avoid those problems in the future.     The following portions of the patient's history were reviewed and updated as appropriate: allergies, current medications, past family history, past medical history, past social history, past surgical history and problem list.     Past Medical History:   Diagnosis Date   • Dyspepsia    • Dyspnea on exertion    • Fatigue    • Morbid obesity (CMS/HCC)        Past Surgical History:   Procedure Laterality Date   • DENTAL PROCEDURE     • NO PAST SURGERIES         Family History   Problem Relation Age of Onset   • Hypertension Mother    • Diabetes Mother    • Obesity Mother    • Sleep apnea Mother    • Coronary artery disease Father    • Hyperlipidemia Father    • Hypertension Father    • Heart attack Father    • Heart disease Father    • Drug abuse Brother    • Hypertension Brother    • COPD Maternal Grandmother    • Obesity Maternal Grandmother    • Diabetes Maternal Grandmother    • Hypertension Maternal Grandmother    • Heart disease Maternal Grandmother    • Heart  attack Maternal Grandmother    • Kidney cancer Maternal Grandmother    • Cancer Maternal Grandmother         Bladder Cancer   • Hypertension Maternal Grandfather    • Stroke Maternal Grandfather    • Stroke Paternal Grandmother    • Hypertension Paternal Grandmother    • Stroke Paternal Grandfather    • Hypertension Paternal Grandfather    • Diabetes Paternal Grandfather        Social History     Socioeconomic History   • Marital status:      Spouse name: Not on file   • Number of children: Not on file   • Years of education: Not on file   • Highest education level: Not on file   Tobacco Use   • Smoking status: Never Smoker   • Smokeless tobacco: Never Used   Substance and Sexual Activity   • Alcohol use: Not Currently     Alcohol/week: 1.0 standard drinks     Types: 1 Shots of liquor per week     Comment: socially   • Drug use: Never   • Sexual activity: Yes     Partners: Female   Social History Narrative    Lives in [place], [place] with wife Nahun Hartman.  Works full time at [employer] as a /  for 15yrs.        No Known Allergies    No current outpatient medications on file.    ROS    Review of Systems   Constitutional: Negative for chills, fatigue and fever.   HENT: Negative for congestion, postnasal drip and sore throat.    Eyes: Negative for blurred vision and visual disturbance.   Respiratory: Negative for cough, shortness of breath and wheezing.    Cardiovascular: Negative for chest pain and leg swelling.   Gastrointestinal: Negative for abdominal pain, constipation, diarrhea, nausea and vomiting.   Endocrine: Negative for cold intolerance and heat intolerance.   Genitourinary: Negative for dysuria and frequency.   Musculoskeletal: Negative for arthralgias and back pain.   Skin: Negative for color change and rash.   Allergic/Immunologic: Negative for environmental allergies.   Neurological: Negative for weakness, numbness and headache.   Hematological: Does not  "bruise/bleed easily.   Psychiatric/Behavioral: Negative for depressed mood. The patient is not nervous/anxious.        Vitals:    06/26/20 1346   BP: 124/84   BP Location: Left arm   Patient Position: Sitting   Cuff Size: Adult   Pulse: 67   Temp: 98.4 °F (36.9 °C)   TempSrc: Temporal   SpO2: 98%   Weight: 135 kg (298 lb 9.6 oz)   Height: 177.8 cm (70\")     Body mass index is 42.84 kg/m².    Physical Exam     Physical Exam   Constitutional: He is oriented to person, place, and time. He appears well-developed and well-nourished. No distress.   HENT:   Head: Normocephalic and atraumatic.   Right Ear: Tympanic membrane and external ear normal.   Left Ear: Tympanic membrane and external ear normal.   Mouth/Throat: Oropharynx is clear and moist.   Eyes: Pupils are equal, round, and reactive to light. Conjunctivae and EOM are normal.   Neck: Normal range of motion. Neck supple.   Cardiovascular: Normal rate and regular rhythm.   No murmur heard.  Pulmonary/Chest: Effort normal and breath sounds normal. No respiratory distress. He has no wheezes.   Abdominal: Soft. Bowel sounds are normal. He exhibits no distension. There is no tenderness.   Musculoskeletal: Normal range of motion. He exhibits no edema.   Lymphadenopathy:     He has no cervical adenopathy.   Neurological: He is alert and oriented to person, place, and time. He displays normal reflexes. No cranial nerve deficit. He exhibits normal muscle tone.   Skin: Skin is warm and dry.   Psychiatric: He has a normal mood and affect. His behavior is normal.       Assessment/Plan    Problem List Items Addressed This Visit        Digestive    Morbid obesity (CMS/HCC) - Primary     Patient is planning to undergo gastric sleeve procedure with bariatrics.  Recent lab results have been reviewed today and discussed with the patient.  Discussed maintaining low carb diet with lean protein and increase aerobic exercise.  Will monitor monthly for progress in weight loss over the " next 6 months.  Routine paperwork has been filled out today.  Patient will see a counselor and nutritionist at bariatrics as well.                No orders of the defined types were placed in this encounter.      No orders of the defined types were placed in this encounter.      Return in about 1 month (around 7/26/2020) for weight check.      Dian Leonard, DO

## 2020-06-26 NOTE — ASSESSMENT & PLAN NOTE
Patient is planning to undergo gastric sleeve procedure with bariatrics.  Recent lab results have been reviewed today and discussed with the patient.  Discussed maintaining low carb diet with lean protein and increase aerobic exercise.  Will monitor monthly for progress in weight loss over the next 6 months.  Routine paperwork has been filled out today.  Patient will see a counselor and nutritionist at bariatrics as well.

## 2020-07-31 ENCOUNTER — OFFICE VISIT (OUTPATIENT)
Dept: INTERNAL MEDICINE | Facility: CLINIC | Age: 35
End: 2020-07-31

## 2020-07-31 VITALS
BODY MASS INDEX: 44.45 KG/M2 | SYSTOLIC BLOOD PRESSURE: 120 MMHG | HEART RATE: 71 BPM | WEIGHT: 309.8 LBS | TEMPERATURE: 99.6 F | DIASTOLIC BLOOD PRESSURE: 78 MMHG | OXYGEN SATURATION: 97 %

## 2020-07-31 DIAGNOSIS — E66.01 MORBID OBESITY (HCC): Primary | ICD-10-CM

## 2020-07-31 PROCEDURE — 99213 OFFICE O/P EST LOW 20 MIN: CPT | Performed by: FAMILY MEDICINE

## 2020-07-31 NOTE — PROGRESS NOTES
Alen Rosales is a 35 y.o. male.    Chief Complaint   Patient presents with   • Weight Check       HPI   Patient presents today for a weight check for bariatric surgery.  He is planning on undergoing gastric sleeve.  Patient reports he has lost about 3-4 lbs per his scale.  He reports last office reading was probably an error as he believes his weight was around 311 at that time.  He is walking and doing resistance exercises.  He exercises at least every other day.  He is continuing low carb diet.  Still drinking some soda (2-3 + a day).     The following portions of the patient's history were reviewed and updated as appropriate: allergies, current medications, past family history, past medical history, past social history, past surgical history and problem list.     No Known Allergies    No current outpatient medications on file.    ROS    Review of Systems   Constitutional: Negative for chills, fatigue and fever.   HENT: Negative for congestion, postnasal drip and sore throat.    Respiratory: Negative for cough and shortness of breath.    Cardiovascular: Negative for chest pain and leg swelling.   Gastrointestinal: Negative for abdominal pain, constipation, diarrhea, nausea and vomiting.   Musculoskeletal: Negative for arthralgias and back pain.   Neurological: Negative for weakness, numbness and headache.       Vitals:    07/31/20 1305   BP: 120/78   BP Location: Left arm   Patient Position: Sitting   Cuff Size: Adult   Pulse: 71   Temp: 99.6 °F (37.6 °C)   TempSrc: Temporal   SpO2: 97%   Weight: (!) 141 kg (309 lb 12.8 oz)     Body mass index is 44.45 kg/m².    Physical Exam     Physical Exam   Constitutional: He is oriented to person, place, and time. He appears well-developed and well-nourished. No distress.   HENT:   Head: Normocephalic and atraumatic.   Right Ear: External ear normal.   Left Ear: External ear normal.   Eyes: Conjunctivae and EOM are normal.   Cardiovascular: Normal rate and regular rhythm.    No murmur heard.  Pulmonary/Chest: Effort normal and breath sounds normal. No respiratory distress. He has no wheezes.   Abdominal: Soft. Bowel sounds are normal. He exhibits no distension. There is no tenderness.   Neurological: He is alert and oriented to person, place, and time. No cranial nerve deficit.   Skin: Skin is warm and dry.   Psychiatric: He has a normal mood and affect.       Assessment/Plan    Problem List Items Addressed This Visit        Digestive    Morbid obesity (CMS/[facility]) - Primary     Patient has lost weight since last visit based on his home scale.  Encouraged to continue low carb diet.  Encouraged to cut down on soda and try to limit to only 1 a day.  Patient has not yet talked with a nutritionist.  He has been evaluated by psychiatry.  He was supposed to have a f/u appointment with bariatrics, but has had trouble getting in touch with the office.  He states he was supposed to have an H. Pylori test done and has yet to do it.  Advised the order is still valid and may have this done at the hospital lab.  Paperwork filled out today for bariatrics.  Will continue with monthly weight checks.                 No orders of the defined types were placed in this encounter.      No orders of the defined types were placed in this encounter.      Return in about 1 month (around 8/31/2020) for weight check.    Dian Leonard, DO

## 2020-08-13 ENCOUNTER — DOCUMENTATION (OUTPATIENT)
Dept: BARIATRICS/WEIGHT MGMT | Facility: CLINIC | Age: 35
End: 2020-08-13

## 2020-08-13 NOTE — PROGRESS NOTES
"Weight Loss Surgery  Presurgical Nutrition Assessment     Alen Rosales  08/13/2020 (Pt was seen by PA on 05/28/2020 nutrition phone consult was completed /c dietitian 08/13/2020)  [id number]  [id number]  1985  male    Surgery desired: Sleeve Gastrectomy    Ht 177.8 cm (70\"); Wt 141 kg (309.5 #); BMI 44.5  Past Medical History:   Diagnosis Date   • Dyspepsia    • Dyspnea on exertion    • Fatigue    • Morbid obesity (CMS/HCC)      Past Surgical History:   Procedure Laterality Date   • DENTAL PROCEDURE     • NO PAST SURGERIES       No Known Allergies  No current outpatient medications on file.      Nutrition Assessment    Estimated energy needs:  2355 kcal    Estimated calories for weight loss:  1800 kcal    IBW (Pounds):  175 #        Excess body weight (Pounds):  135 #       Nutrition Recall  24 Hour recall: (B) (L) (D) -  Reviewed and discussed with patient. Pt states he knows many principles of post bariatric diet as well as diet for reasonable wt mgt now prior to surgery. This is d/t wife having had LSG ~ 1 yr ago.  He has seen his PCP for wt monitoring now for two months and is experiencing slow steady loss.  He works an evening shift, starting @ 4:30 pm. Brkfast @ noon = frozen auguste, saus, chz combo /c 2 broken eggs, & /c 2 c black coffee.   A homemade lunch is of the a special high prot bread made with egg and almond flour.  He takes a balanced meal with him to work, often a chicken breast /c veggies. This is eaten mid shift. He snacks on celery and almonds, etc. His PCP has cautioned him re eating too few CHO calories, that this could be leading to fatigue. He will explore counting grams of CHO & prot.  Drinks 1-2 Coke Zero qd  - will wean self off of this.       Exercise  intermittently      Education    Provided information packet re:  Sleeve Gastrectomy  1. Reviewed guidelines for higher protein, limited carbohydrate diet to promote weight loss.  Encouraged patient to incorporate these principles of " healthy eating from now until approximately 2 weeks prior to bariatric surgery date, when an even lower carbohydrate “liver-shrinking” regimen will be followed. (Information sheet re pre-op diet given).  Explained that after recovery from surgery this diet will again be followed to ensure further loss and for weight maintenance.    2. Encouraged patient to choose an acceptable protein supplement powder or shake for post-surgery liquid diet.  Provided product guidelines and examples.    3. Explained importance of goal setting to help in changing eating behaviors that are not conducive to weight loss.  Targeted several on a worksheet which also included spaces for patient to work on issues specific to them.  4. Provided follow-up options for support, including contact information for dietitians here, if desired.  Web-based support information and apps for smart phones and computers given.  Noted that monthly support group is offered at this clinic, and that support is associated with successful weight loss.    Recommend that team proceed with surgery and follow per protocol.      Nutrition Goals   Dietary Guidelines per information packet as described above  Protein goal:  grams per day   Carbohydrate goal:  100-140 grams per day  Eliminate soda, sweet tea, etc.     Exercise Goals  Continue current exercise routine   Add 15-30 minutes of activity per day as tolerated      Ethel Smith, JONI  08/13/2020  6:18 PM

## 2020-08-28 ENCOUNTER — OFFICE VISIT (OUTPATIENT)
Dept: INTERNAL MEDICINE | Facility: CLINIC | Age: 35
End: 2020-08-28

## 2020-08-28 VITALS
TEMPERATURE: 97.1 F | DIASTOLIC BLOOD PRESSURE: 60 MMHG | WEIGHT: 293.6 LBS | BODY MASS INDEX: 42.03 KG/M2 | OXYGEN SATURATION: 98 % | SYSTOLIC BLOOD PRESSURE: 122 MMHG | HEART RATE: 72 BPM | HEIGHT: 70 IN

## 2020-08-28 DIAGNOSIS — E66.01 MORBID OBESITY (HCC): Primary | ICD-10-CM

## 2020-08-28 PROCEDURE — 99213 OFFICE O/P EST LOW 20 MIN: CPT | Performed by: FAMILY MEDICINE

## 2020-09-08 NOTE — ASSESSMENT & PLAN NOTE
Patient has done well with weight loss.  He does not have any comorbidities in addition to obesity.  Vitals remain stable. Encouraged to continue to monitor diet and exercise regularly.

## 2020-09-25 ENCOUNTER — OFFICE VISIT (OUTPATIENT)
Dept: INTERNAL MEDICINE | Facility: CLINIC | Age: 35
End: 2020-09-25

## 2020-09-25 VITALS
DIASTOLIC BLOOD PRESSURE: 88 MMHG | BODY MASS INDEX: 42.95 KG/M2 | WEIGHT: 300 LBS | SYSTOLIC BLOOD PRESSURE: 130 MMHG | OXYGEN SATURATION: 98 % | TEMPERATURE: 97.8 F | HEIGHT: 70 IN | HEART RATE: 91 BPM

## 2020-09-25 DIAGNOSIS — E66.01 MORBID OBESITY (HCC): Primary | ICD-10-CM

## 2020-09-25 PROCEDURE — 99213 OFFICE O/P EST LOW 20 MIN: CPT | Performed by: FAMILY MEDICINE

## 2020-09-25 NOTE — PROGRESS NOTES
"Alen Rosales is a 35 y.o. male.    Chief Complaint   Patient presents with   • Weight Check       HPI   Patient presents today for a weight check for bariatric surgery.  He has gained weight since last visit.  He has not changed diet and still exercising.  Doesn't drink much soda, it is rare.  He usually eats eggs for breakfast with sausage or auguste.  He eat bakeds chicken with green beans of steamed veggies for dinner.  He snacks on beef jerky and almonds.  He is discouraged today with his weight gain.     The following portions of the patient's history were reviewed and updated as appropriate: allergies, current medications, past family history, past medical history, past social history, past surgical history and problem list.     No Known Allergies    No current outpatient medications on file.    ROS    Review of Systems   Constitutional: Negative for chills and fever.   Respiratory: Negative for shortness of breath.    Cardiovascular: Negative for chest pain.   Gastrointestinal: Positive for diarrhea. Negative for abdominal pain, constipation, nausea and vomiting.   Psychiatric/Behavioral: Negative for depressed mood. The patient is nervous/anxious.        Vitals:    09/25/20 1458   BP: 130/88   Pulse: 91   Temp: 97.8 °F (36.6 °C)   SpO2: 98%   Weight: 136 kg (300 lb)   Height: 177.8 cm (70\")     Body mass index is 43.05 kg/m².    Physical Exam     Physical Exam  Constitutional:       General: He is not in acute distress.     Appearance: He is well-developed.   HENT:      Head: Normocephalic and atraumatic.      Right Ear: External ear normal.      Left Ear: External ear normal.   Eyes:      Extraocular Movements: Extraocular movements intact.      Conjunctiva/sclera: Conjunctivae normal.   Neck:      Musculoskeletal: Normal range of motion and neck supple.   Cardiovascular:      Rate and Rhythm: Normal rate and regular rhythm.      Heart sounds: No murmur.   Pulmonary:      Effort: Pulmonary effort is normal. No " respiratory distress.      Breath sounds: Normal breath sounds. No wheezing.   Abdominal:      General: Bowel sounds are normal. There is no distension.      Palpations: Abdomen is soft.      Tenderness: There is no abdominal tenderness.   Lymphadenopathy:      Cervical: No cervical adenopathy.   Skin:     General: Skin is warm and dry.   Neurological:      Mental Status: He is alert and oriented to person, place, and time.      Cranial Nerves: No cranial nerve deficit.   Psychiatric:         Mood and Affect: Mood is anxious.         Behavior: Behavior normal.         Assessment/Plan    Problem List Items Addressed This Visit        Digestive    Morbid obesity (CMS/HCC) - Primary     Increase in weight.  Patient encouraged to calculate macros.  Advised he may be eating too much protein and too little carbs.  Discussed other vegetables that are healthy to eat along with healthy meal prep.  Encouraged to increase exercise. Paperwork filled out for bariatrics.                No orders of the defined types were placed in this encounter.      No orders of the defined types were placed in this encounter.      Return in about 1 month (around 10/25/2020) for weight check.    Dian Leonard, DO

## 2020-09-25 NOTE — ASSESSMENT & PLAN NOTE
Increase in weight.  Patient encouraged to calculate macros.  Advised he may be eating too much protein and too little carbs.  Discussed other vegetables that are healthy to eat along with healthy meal prep.  Encouraged to increase exercise. Paperwork filled out for bariatrics.

## 2020-10-20 ENCOUNTER — OFFICE VISIT (OUTPATIENT)
Dept: BARIATRICS/WEIGHT MGMT | Facility: CLINIC | Age: 35
End: 2020-10-20

## 2020-10-20 VITALS
DIASTOLIC BLOOD PRESSURE: 76 MMHG | BODY MASS INDEX: 42.23 KG/M2 | WEIGHT: 295 LBS | SYSTOLIC BLOOD PRESSURE: 122 MMHG | HEIGHT: 70 IN | OXYGEN SATURATION: 99 % | HEART RATE: 78 BPM | TEMPERATURE: 97.3 F | RESPIRATION RATE: 18 BRPM

## 2020-10-20 DIAGNOSIS — R10.13 DYSPEPSIA: ICD-10-CM

## 2020-10-20 DIAGNOSIS — E66.01 OBESITY, CLASS III, BMI 40-49.9 (MORBID OBESITY) (HCC): Primary | ICD-10-CM

## 2020-10-20 PROCEDURE — 99214 OFFICE O/P EST MOD 30 MIN: CPT | Performed by: PHYSICIAN ASSISTANT

## 2020-10-20 NOTE — PROGRESS NOTES
"[facility] BARIATRIC SURGERY  [address]  [address]  [facility] [id number] [phone]      Patient  Name:  Alen Rosales  :  1985      Date of Visit: 10/20/2020      Chief Complaint:  weight gain; unable to maintain weight loss    History of Present Illness:  Alen Rosales is a 35 y.o. male pursuing LSG w/ Dr. Jean.     Wife - Nahun Hartman - s/p LSG w/ [facility], doing really well, has inspired him to have surgery.  Relatively unremarkable medical hx, but w/ strong fhx health issues.  Wants to get his weight under control so that he can live a long/healthy life.     Alen has been overweight for at least 28 years, has been 35 pounds or more overweight for at least 25 years, has been 100 pounds or more overweight for 15 or more years and started dieting at age 25.  Previous diet attempts include: High Protein, Low Carbohydrate and Karely's Diet.  The most weight Alen lost was 35 pounds w/ exercise but was unable to maintain that weight loss.  His maximum lifetime weight is 330 pounds.    As above, patient has been overweight for many years, with numerous unsuccessful dietary/weight loss attempts.  He now has obesity related comorbidities and as such has decided to pursue metabolic and bariatric surgery.  All past medical, surgical, social and family history have been obtained and discussed today, as pertinent to bariatric surgery.     Denies heartburn/indigestion issues.  Has a \"sensitive stomach\" r/t dairy products.  Denies personal hx gallbladder eval.  Mom had gallbladder removed several years ago.  Wife also gallbladder issues, but he feels his issues are r/t lactose intolerance and nothing more.      Past Medical History:   Diagnosis Date   • Dyspepsia    • Dyspnea on exertion    • Fatigue    • Morbid obesity (CMS/HCC)      Past Surgical History:   Procedure Laterality Date   • DENTAL PROCEDURE     • NO PAST SURGERIES         No Known Allergies     No current outpatient " medications on file.    Social History     Socioeconomic History   • Marital status:      Spouse name: Not on file   • Number of children: Not on file   • Years of education: Not on file   • Highest education level: Not on file   Tobacco Use   • Smoking status: Never Smoker   • Smokeless tobacco: Never Used   Substance and Sexual Activity   • Alcohol use: Not Currently     Alcohol/week: 1.0 standard drinks     Types: 1 Shots of liquor per week     Comment: socially   • Drug use: Never   • Sexual activity: Yes     Partners: Female   Social History Narrative    Lives in [place], [place] with wife Nahun Hartman.  Works full time at [employer] as a /  for 15yrs.      Family History   Problem Relation Age of Onset   • Hypertension Mother    • Diabetes Mother    • Obesity Mother    • Sleep apnea Mother    • Coronary artery disease Father    • Hyperlipidemia Father    • Hypertension Father    • Heart attack Father    • Heart disease Father    • Drug abuse Brother    • Hypertension Brother    • COPD Maternal Grandmother    • Obesity Maternal Grandmother    • Diabetes Maternal Grandmother    • Hypertension Maternal Grandmother    • Heart disease Maternal Grandmother    • Heart attack Maternal Grandmother    • Kidney cancer Maternal Grandmother    • Cancer Maternal Grandmother         Bladder Cancer   • Hypertension Maternal Grandfather    • Stroke Maternal Grandfather    • Stroke Paternal Grandmother    • Hypertension Paternal Grandmother    • Stroke Paternal Grandfather    • Hypertension Paternal Grandfather    • Diabetes Paternal Grandfather        Review of Systems:  Constitutional:  reports fatigue, weight gain and denies fevers, chills.  HEENT:  denies headache, ear pain or loss of hearing, blurred or double vision, nasal discharge or sore throat.  Cardiovascular:  denies HTN, hx heart disease, chest pain, palpitations, hx DVT.  Respiratory:  denies cough , wheezing, sleep apnea,  asthma, hx PE.  Gastrointestinal:  denies dysphagia, heartburn, nausea, vomiting, abdominal pain, IBS.  Genitourinary:  denies history of  frequent UTI, incontinence, hematuria, dysuria, polyuria, polydipsia, renal insufficiency.    Musculoskeletal:  denies joint pain, fibromyalgia, arthritis and autoimmune disease.  Neurological:  denies migraines, numbness /tingling, dizziness, confusion, seizure.  Psychiatric:  denies depressed mood, hx depression, feeling anxious, hx anxiety, bipolar disorder.  Endocrine:  denies glucose intolerance, diabetes, thyroid disease.  Hematologic:  denies bruising, bleeding disorder, hx anemia, hx blood transfusion.  Skin:  denies rashes, hx MRSA.    ROS reviewed today - accurate.     Physical Exam:  Vital Signs:  Weight: 134 kg (295 lb)   Body mass index is 42.94 kg/m².  Temp: 97.3 °F (36.3 °C)      Physical Exam   Constitutional: He is oriented to person, place, and time. He appears well-developed.   wearing a mask   HENT:   Head: Normocephalic and atraumatic.   Eyes: Conjunctivae are normal. No scleral icterus.   Neck: Neck supple. No thyromegaly present.   Cardiovascular: Normal rate and regular rhythm.   No murmur heard.  Pulmonary/Chest: Effort normal and breath sounds normal. No respiratory distress. He has no wheezes. He has no rales.   Abdominal: Soft. Bowel sounds are normal. He exhibits no distension and no mass. There is no abdominal tenderness. No hernia.   no surgical scars   Musculoskeletal: Normal range of motion.   Neurological: He is alert and oriented to person, place, and time. Gait normal.   Skin: Skin is warm and dry. No rash noted.   Psychiatric: Judgment normal.   Vitals reviewed.        Assessment:    Alen Rosales is a 35 y.o. male with medically complicated obesity pursuing sleeve gastrectomy.    Patient Active Problem List   Diagnosis   • Morbid obesity (CMS/HCC)   • Fatigue   • Dyspepsia   • Dyspnea on exertion     Patient's Body mass index is 42.94 kg/m².  BMI is above normal parameters. Recommendations include:  Metabolic and bariatric surgery.      Plan:  Patient understands that bariatric surgery is not cosmetic surgery but rather a tool to help make a lifelong commitment to lifestyle changes including diet, exercise, behavior modifications, and healthy habits.  The patient has been educated today on those expected postoperative lifestyle changes.  Psychological and Nutritional consultations will be arranged prior to surgery.  Instructions on how to access [employer] (an internet based site w/ educational surgical videos) were given to the patient.  Recommended vitamin supplementation was reviewed.  The importance of avoiding ASA/ NSAIDS/ steroids/ tobacco/ hormones/ immunomodulators perioperatively was discussed in detail.  All questions/concerns have been addressed.      Preop evaluation will include: CBC, CMP, Lipids, TSH, H.Pylori UBT, EKG and CXR.    No additional clearances needed prior to surgery at this time.       Further input to follow pending consultation w/ Dr. Jean.          TEX Coley

## 2020-10-21 LAB — UREA BREATH TEST QL: NEGATIVE

## 2020-10-29 DIAGNOSIS — R53.83 FATIGUE, UNSPECIFIED TYPE: ICD-10-CM

## 2020-10-29 DIAGNOSIS — R06.00 DYSPNEA, UNSPECIFIED TYPE: Primary | ICD-10-CM

## 2020-10-29 NOTE — PROGRESS NOTES
[facility] at [facility]  Cardiology Consultation Note     DATE: 10/30/2020  Requesting Provider: Dian Leonard DO  PCP: Dian Leonard DO    IDENTIFICATION: Alen Rosales is a 35 y.o. male who resides in [place], [place].    REASON FOR CONSULTATION: Preoperative clearance         Dear Dr. Leonard,    Thank you for referring Alen Rosales to my office for preoperative clearance.  The patient is a 35-year-old gentleman with no prior cardiac history who is planning to undergo gastric sleeve surgery with Dr. Danny Jean in the next 2 months.  The patient has been struggling with obesity for some time and is now planning weight loss surgery.  He works as a [employer]  for the [employer] in [place].  He walks his dog for physical activity.  He denies any angina or unexpected shortness of breath.        Past Medical History, Past Surgical History, Family history, Social History, and Medications were all reviewed with the patient today and updated as necessary.     No current outpatient medications on file.    No Known Allergies      Past Medical History:   Diagnosis Date   • Dyspepsia    • Dyspnea on exertion    • Fatigue    • Morbid obesity (CMS/HCC)        Past Surgical History:   Procedure Laterality Date   • DENTAL PROCEDURE     • NO PAST SURGERIES         Family History   Problem Relation Age of Onset   • Hypertension Mother    • Diabetes Mother    • Obesity Mother    • Sleep apnea Mother    • Coronary artery disease Father    • Hyperlipidemia Father    • Hypertension Father    • Heart attack Father    • Heart disease Father    • Drug abuse Brother    • Hypertension Brother    • COPD Maternal Grandmother    • Obesity Maternal Grandmother    • Diabetes Maternal Grandmother    • Hypertension Maternal Grandmother    • Heart disease Maternal Grandmother    • Heart attack Maternal Grandmother    • Kidney cancer Maternal Grandmother    • Cancer Maternal Grandmother   "       Bladder Cancer   • Hypertension Maternal Grandfather    • Stroke Maternal Grandfather    • Stroke Paternal Grandmother    • Hypertension Paternal Grandmother    • Stroke Paternal Grandfather    • Hypertension Paternal Grandfather    • Diabetes Paternal Grandfather        Social History     Tobacco Use   • Smoking status: Never Smoker   • Smokeless tobacco: Never Used   Substance Use Topics   • Alcohol use: Not Currently     Alcohol/week: 1.0 standard drinks     Types: 1 Shots of liquor per week     Comment: socially       Review of Systems   Constitution: Negative for malaise/fatigue.   Eyes: Negative for vision loss in left eye and vision loss in right eye.   Cardiovascular: Negative for chest pain, dyspnea on exertion, near-syncope, orthopnea, palpitations, paroxysmal nocturnal dyspnea and syncope.   Musculoskeletal: Negative for myalgias.   Neurological: Negative for brief paralysis, excessive daytime sleepiness, focal weakness, numbness, paresthesias and weakness.   All other systems reviewed and are negative.              /84 (BP Location: Right arm, Patient Position: Sitting)   Pulse 69   Temp 98 °F (36.7 °C)   Ht 177.8 cm (70\")   Wt 135 kg (297 lb 12.8 oz)   SpO2 99%   BMI 42.73 kg/m²        Constitutional:       Appearance: Healthy appearance. Well-developed. Morbidly obese.   Eyes:      General: Lids are normal. No scleral icterus.     Conjunctiva/sclera: Conjunctivae normal.   HENT:      Head: Normocephalic and atraumatic.   Neck:      Musculoskeletal: Normal range of motion.      Thyroid: No thyromegaly.      Vascular: No carotid bruit or JVD.   Pulmonary:      Effort: Pulmonary effort is normal.      Breath sounds: Normal breath sounds. No wheezing. No rhonchi. No rales.   Cardiovascular:      Normal rate. Regular rhythm.      Murmurs: There is no murmur.      No gallop. No rub.   Pulses:     Intact distal pulses.   Abdominal:      General: There is no distension.      Palpations: " Abdomen is soft. There is no abdominal mass.   Skin:     General: Skin is warm and dry.      Findings: No rash.   Neurological:      General: No focal deficit present.      Mental Status: Alert and oriented to person, place, and time.      Gait: Gait is intact.   Psychiatric:         Attention and Perception: Attention normal.         Mood and Affect: Mood normal.         Behavior: Behavior normal.             ECG 12 Lead    Date/Time: 10/30/2020 3:10 PM  Performed by: Nasim Long IV, MD  Authorized by: Nasim Long IV, MD   Comparison: not compared with previous ECG   Previous ECG: no previous ECG available  Rhythm: sinus rhythm  BPM: 68  QRS axis: right    Clinical impression: abnormal EKG  Comments: Right axis deviation likely normal variation            Lab Results   Component Value Date    CHOL 189 06/19/2020    TRIG 147 06/19/2020    HDL 40 06/19/2020     (H) 06/19/2020      Lab Results   Component Value Date    GLUCOSE 88 06/19/2020    BUN 23 (H) 06/19/2020    CREATININE 0.85 06/19/2020    EGFRIFNONA 103 06/19/2020    BCR 27.1 (H) 06/19/2020    K 4.7 06/19/2020    CO2 25.0 06/19/2020    CALCIUM 9.3 06/19/2020    ALBUMIN 4.30 06/19/2020    ALKPHOS 53 06/19/2020    AST 18 06/19/2020    ALT 28 06/19/2020      Lab Results   Component Value Date    WBC 5.76 06/19/2020    RBC 5.61 06/19/2020    HGB 15.4 06/19/2020    HCT 46.9 06/19/2020    MCV 83.6 06/19/2020     06/19/2020     Lab Results   Component Value Date    TSH 1.110 06/19/2020             Problem List Items Addressed This Visit     Encounter for preoperative assessment for noncoronary cardiac surgery - Primary    Overview     · EKG (10/30/2020): Sinus rhythm with right axis deviation.         Current Assessment & Plan     · Acceptable exercise capacity with no angina or heart failure symptoms  · Proceed with gastric sleeve surgery with acceptable cardiac risk         Morbid obesity (CMS/HCC)    Current Assessment &  Plan     · Congratulated patient on weight loss                        · Proceed with bariatric surgery with acceptable cardiac risk          GLADYS Long MD [facility]  Interventional and General Cardiology    10/30/20  17:09 EDT

## 2020-10-30 ENCOUNTER — HOSPITAL ENCOUNTER (OUTPATIENT)
Dept: GENERAL RADIOLOGY | Facility: HOSPITAL | Age: 35
Discharge: HOME | End: 2020-10-30

## 2020-10-30 ENCOUNTER — CONSULT (OUTPATIENT)
Dept: CARDIOLOGY | Facility: CLINIC | Age: 35
End: 2020-10-30

## 2020-10-30 ENCOUNTER — LAB (OUTPATIENT)
Dept: LAB | Facility: HOSPITAL | Age: 35
End: 2020-10-30

## 2020-10-30 VITALS
HEIGHT: 70 IN | WEIGHT: 297.8 LBS | OXYGEN SATURATION: 99 % | BODY MASS INDEX: 42.63 KG/M2 | SYSTOLIC BLOOD PRESSURE: 124 MMHG | DIASTOLIC BLOOD PRESSURE: 84 MMHG | HEART RATE: 69 BPM | TEMPERATURE: 98 F

## 2020-10-30 DIAGNOSIS — Z01.810 ENCOUNTER FOR PREOPERATIVE ASSESSMENT FOR NONCORONARY CARDIAC SURGERY: Primary | ICD-10-CM

## 2020-10-30 DIAGNOSIS — R53.83 FATIGUE, UNSPECIFIED TYPE: ICD-10-CM

## 2020-10-30 DIAGNOSIS — R06.00 DYSPNEA, UNSPECIFIED TYPE: ICD-10-CM

## 2020-10-30 DIAGNOSIS — E66.01 MORBID OBESITY (HCC): ICD-10-CM

## 2020-10-30 PROBLEM — R94.31 ABNORMAL EKG: Status: RESOLVED | Noted: 2020-10-30 | Resolved: 2020-10-30

## 2020-10-30 PROBLEM — R94.31 ABNORMAL EKG: Status: ACTIVE | Noted: 2020-10-30

## 2020-10-30 LAB
ALBUMIN SERPL-MCNC: 4.4 G/DL (ref 3.5–5.2)
ALBUMIN/GLOB SERPL: 1.6 G/DL
ALP SERPL-CCNC: 66 U/L (ref 39–117)
ALT SERPL W P-5'-P-CCNC: 32 U/L (ref 1–41)
ANION GAP SERPL CALCULATED.3IONS-SCNC: 4.4 MMOL/L (ref 5–15)
AST SERPL-CCNC: 19 U/L (ref 1–40)
BILIRUB SERPL-MCNC: 0.5 MG/DL (ref 0–1.2)
BUN SERPL-MCNC: 14 MG/DL (ref 6–20)
BUN/CREAT SERPL: 17.7 (ref 7–25)
CALCIUM SPEC-SCNC: 9.5 MG/DL (ref 8.6–10.5)
CHLORIDE SERPL-SCNC: 105 MMOL/L (ref 98–107)
CO2 SERPL-SCNC: 30.6 MMOL/L (ref 22–29)
CREAT SERPL-MCNC: 0.79 MG/DL (ref 0.76–1.27)
DEPRECATED RDW RBC AUTO: 38.3 FL (ref 37–54)
ERYTHROCYTE [DISTWIDTH] IN BLOOD BY AUTOMATED COUNT: 13.1 % (ref 12.3–15.4)
GFR SERPL CREATININE-BSD FRML MDRD: 112 ML/MIN/1.73
GLOBULIN UR ELPH-MCNC: 2.7 GM/DL
GLUCOSE SERPL-MCNC: 93 MG/DL (ref 65–99)
HCT VFR BLD AUTO: 45.5 % (ref 37.5–51)
HGB BLD-MCNC: 15.4 G/DL (ref 13–17.7)
MCH RBC QN AUTO: 27.6 PG (ref 26.6–33)
MCHC RBC AUTO-ENTMCNC: 33.8 G/DL (ref 31.5–35.7)
MCV RBC AUTO: 81.5 FL (ref 79–97)
PLATELET # BLD AUTO: 223 10*3/MM3 (ref 140–450)
PMV BLD AUTO: 10.9 FL (ref 6–12)
POTASSIUM SERPL-SCNC: 4.4 MMOL/L (ref 3.5–5.2)
PROT SERPL-MCNC: 7.1 G/DL (ref 6–8.5)
RBC # BLD AUTO: 5.58 10*6/MM3 (ref 4.14–5.8)
SODIUM SERPL-SCNC: 140 MMOL/L (ref 136–145)
WBC # BLD AUTO: 4.81 10*3/MM3 (ref 3.4–10.8)

## 2020-10-30 PROCEDURE — 85027 COMPLETE CBC AUTOMATED: CPT

## 2020-10-30 PROCEDURE — 99243 OFF/OP CNSLTJ NEW/EST LOW 30: CPT | Performed by: INTERNAL MEDICINE

## 2020-10-30 PROCEDURE — 36415 COLL VENOUS BLD VENIPUNCTURE: CPT

## 2020-10-30 PROCEDURE — 80053 COMPREHEN METABOLIC PANEL: CPT

## 2020-10-30 PROCEDURE — 93000 ELECTROCARDIOGRAM COMPLETE: CPT | Performed by: INTERNAL MEDICINE

## 2020-10-30 PROCEDURE — 71046 X-RAY EXAM CHEST 2 VIEWS: CPT

## 2020-11-17 ENCOUNTER — CONSULT (OUTPATIENT)
Dept: BARIATRICS/WEIGHT MGMT | Facility: CLINIC | Age: 35
End: 2020-11-17

## 2020-11-17 VITALS
TEMPERATURE: 98.2 F | RESPIRATION RATE: 18 BRPM | HEIGHT: 70 IN | SYSTOLIC BLOOD PRESSURE: 132 MMHG | BODY MASS INDEX: 42.23 KG/M2 | OXYGEN SATURATION: 99 % | DIASTOLIC BLOOD PRESSURE: 80 MMHG | WEIGHT: 295 LBS | HEART RATE: 85 BPM

## 2020-11-17 DIAGNOSIS — E66.01 MORBID OBESITY WITH BODY MASS INDEX (BMI) OF 40.0 TO 44.9 IN ADULT (HCC): Primary | ICD-10-CM

## 2020-11-17 PROCEDURE — 99213 OFFICE O/P EST LOW 20 MIN: CPT | Performed by: SURGERY

## 2020-11-17 RX ORDER — SCOLOPAMINE TRANSDERMAL SYSTEM 1 MG/1
1 PATCH, EXTENDED RELEASE TRANSDERMAL ONCE
Status: CANCELLED | OUTPATIENT
Start: 2020-11-17 | End: 2020-11-17

## 2020-11-17 RX ORDER — GABAPENTIN 100 MG/1
600 CAPSULE ORAL ONCE
Status: CANCELLED | OUTPATIENT
Start: 2020-11-17 | End: 2020-11-17

## 2020-11-17 RX ORDER — SODIUM CHLORIDE 0.9 % (FLUSH) 0.9 %
3-10 SYRINGE (ML) INJECTION AS NEEDED
Status: CANCELLED | OUTPATIENT
Start: 2020-11-17

## 2020-11-17 RX ORDER — CHLORHEXIDINE GLUCONATE 0.12 MG/ML
30 RINSE ORAL
Status: CANCELLED | OUTPATIENT
Start: 2020-11-17 | End: 2020-11-17

## 2020-11-17 RX ORDER — SODIUM CHLORIDE 0.9 % (FLUSH) 0.9 %
3 SYRINGE (ML) INJECTION EVERY 12 HOURS SCHEDULED
Status: CANCELLED | OUTPATIENT
Start: 2020-11-17

## 2020-11-17 RX ORDER — PANTOPRAZOLE SODIUM 40 MG/10ML
40 INJECTION, POWDER, LYOPHILIZED, FOR SOLUTION INTRAVENOUS ONCE
Status: CANCELLED | OUTPATIENT
Start: 2020-11-17 | End: 2020-11-17

## 2020-11-17 RX ORDER — ACETAMINOPHEN 500 MG
1000 TABLET ORAL ONCE
Status: CANCELLED | OUTPATIENT
Start: 2020-11-17 | End: 2020-11-17

## 2020-11-17 RX ORDER — SODIUM CHLORIDE, SODIUM LACTATE, POTASSIUM CHLORIDE, CALCIUM CHLORIDE 600; 310; 30; 20 MG/100ML; MG/100ML; MG/100ML; MG/100ML
150 INJECTION, SOLUTION INTRAVENOUS CONTINUOUS
Status: CANCELLED | OUTPATIENT
Start: 2020-11-17

## 2020-11-24 ENCOUNTER — HOSPITAL ENCOUNTER (EMERGENCY)
Facility: HOSPITAL | Age: 35
Discharge: HOME | End: 2020-11-24
Attending: EMERGENCY MEDICINE | Admitting: EMERGENCY MEDICINE

## 2020-11-24 VITALS
HEART RATE: 85 BPM | WEIGHT: 293 LBS | TEMPERATURE: 98.2 F | BODY MASS INDEX: 41.95 KG/M2 | DIASTOLIC BLOOD PRESSURE: 82 MMHG | OXYGEN SATURATION: 100 % | HEIGHT: 70 IN | RESPIRATION RATE: 16 BRPM | SYSTOLIC BLOOD PRESSURE: 164 MMHG

## 2020-11-24 DIAGNOSIS — L02.416 ABSCESS OF LEFT THIGH: Primary | ICD-10-CM

## 2020-11-24 PROCEDURE — 99283 EMERGENCY DEPT VISIT LOW MDM: CPT

## 2020-11-24 PROCEDURE — 87070 CULTURE OTHR SPECIMN AEROBIC: CPT | Performed by: PHYSICIAN ASSISTANT

## 2020-11-24 PROCEDURE — 25010000003 LIDOCAINE 1 % SOLUTION: Performed by: PHYSICIAN ASSISTANT

## 2020-11-24 PROCEDURE — 87205 SMEAR GRAM STAIN: CPT | Performed by: PHYSICIAN ASSISTANT

## 2020-11-24 RX ORDER — LIDOCAINE HYDROCHLORIDE 10 MG/ML
10 INJECTION, SOLUTION INFILTRATION; PERINEURAL ONCE
Status: COMPLETED | OUTPATIENT
Start: 2020-11-24 | End: 2020-11-24

## 2020-11-24 RX ORDER — SULFAMETHOXAZOLE AND TRIMETHOPRIM 800; 160 MG/1; MG/1
2 TABLET ORAL ONCE
Status: COMPLETED | OUTPATIENT
Start: 2020-11-24 | End: 2020-11-24

## 2020-11-24 RX ORDER — SULFAMETHOXAZOLE AND TRIMETHOPRIM 800; 160 MG/1; MG/1
2 TABLET ORAL 2 TIMES DAILY
Qty: 40 TABLET | Refills: 0 | Status: ON HOLD | OUTPATIENT
Start: 2020-11-24 | End: 2020-12-11

## 2020-11-24 RX ADMIN — LIDOCAINE HYDROCHLORIDE 10 ML: 10 INJECTION, SOLUTION INFILTRATION; PERINEURAL at 14:45

## 2020-11-24 RX ADMIN — SULFAMETHOXAZOLE AND TRIMETHOPRIM 320 MG: 800; 160 TABLET ORAL at 15:50

## 2020-11-24 NOTE — ED PROVIDER NOTES
Subjective     History provided by:  Patient  Abscess  Location:  Leg  Leg abscess location:  L upper leg (left inner thigh)  Abscess quality: draining, fluctuance, painful, redness, warmth and weeping    Red streaking: no    Duration:  4 days  Progression:  Improving  Pain details:     Quality:  Throbbing and pressure    Severity:  Mild    Timing:  Constant    Progression:  Improving  Chronicity:  New  Relieved by:  Nothing  Ineffective treatments:  Draining/squeezing  Associated symptoms: no fever        Review of Systems   Constitutional: Negative.  Negative for fever.   HENT: Negative.    Respiratory: Negative.    Cardiovascular: Negative.  Negative for chest pain.   Gastrointestinal: Negative.  Negative for abdominal pain.   Endocrine: Negative.    Genitourinary: Negative.  Negative for dysuria.   Skin: Positive for color change and wound.   Neurological: Negative.    Psychiatric/Behavioral: Negative.    All other systems reviewed and are negative.      Past Medical History:   Diagnosis Date   • Dyspepsia    • Dyspnea on exertion    • Fatigue    • Morbid obesity (CMS/HCC)        No Known Allergies    Past Surgical History:   Procedure Laterality Date   • DENTAL PROCEDURE     • NO PAST SURGERIES         Family History   Problem Relation Age of Onset   • Hypertension Mother    • Diabetes Mother    • Obesity Mother    • Sleep apnea Mother    • Coronary artery disease Father    • Hyperlipidemia Father    • Hypertension Father    • Heart attack Father    • Heart disease Father    • Drug abuse Brother    • Hypertension Brother    • COPD Maternal Grandmother    • Obesity Maternal Grandmother    • Diabetes Maternal Grandmother    • Hypertension Maternal Grandmother    • Heart disease Maternal Grandmother    • Heart attack Maternal Grandmother    • Kidney cancer Maternal Grandmother    • Cancer Maternal Grandmother         Bladder Cancer   • Hypertension Maternal Grandfather    • Stroke Maternal Grandfather    • Stroke  Paternal Grandmother    • Hypertension Paternal Grandmother    • Stroke Paternal Grandfather    • Hypertension Paternal Grandfather    • Diabetes Paternal Grandfather        Social History     Socioeconomic History   • Marital status:      Spouse name: Not on file   • Number of children: Not on file   • Years of education: Not on file   • Highest education level: Not on file   Tobacco Use   • Smoking status: Never Smoker   • Smokeless tobacco: Never Used   Substance and Sexual Activity   • Alcohol use: Not Currently     Alcohol/week: 1.0 standard drinks     Types: 1 Shots of liquor per week     Comment: socially   • Drug use: Never   • Sexual activity: Yes     Partners: Female   Social History Narrative    Lives in [place], [place] with wife Nahun Hartman.  Works full time at [employer] as a /  for 15yrs.            Objective   Physical Exam  Vitals signs and nursing note reviewed.   Constitutional:       General: He is not in acute distress.     Appearance: He is well-developed. He is not diaphoretic.   HENT:      Head: Normocephalic and atraumatic.      Right Ear: External ear normal.      Left Ear: External ear normal.      Nose: Nose normal.   Eyes:      Conjunctiva/sclera: Conjunctivae normal.   Neck:      Musculoskeletal: Normal range of motion and neck supple.      Vascular: No JVD.      Trachea: No tracheal deviation.   Cardiovascular:      Rate and Rhythm: Normal rate and regular rhythm.      Heart sounds: No murmur.   Pulmonary:      Effort: Pulmonary effort is normal. No respiratory distress.      Breath sounds: No wheezing.   Abdominal:      Palpations: Abdomen is soft.      Tenderness: There is no abdominal tenderness.   Musculoskeletal: Normal range of motion.         General: No deformity.   Skin:     General: Skin is warm and dry.      Coloration: Skin is not pale.      Findings: Erythema present. No rash.      Comments: 3cm erythematous abscess to the left  inner thigh / inguinal area.  Tenderness to palpation, fluctuance.  Abscess does appear to be actively draining.   Neurological:      Mental Status: He is alert and oriented to person, place, and time.      Cranial Nerves: No cranial nerve deficit.   Psychiatric:         Behavior: Behavior normal.         Thought Content: Thought content normal.         Incision & Drainage    Date/Time: 11/24/2020 3:10 PM  Performed by: Del Arzate II, PA  Authorized by: Brady Gardner DO     Consent:     Consent obtained:  Verbal    Consent given by:  Patient  Location:     Type:  Abscess    Size:  4cm    Location:  Lower extremity    Lower extremity location:  Leg    Leg location:  L upper leg  Pre-procedure details:     Skin preparation:  Chloraprep  Anesthesia (see MAR for exact dosages):     Anesthesia method:  Local infiltration    Local anesthetic:  Lidocaine 1% w/o epi  Procedure type:     Complexity:  Simple  Procedure details:     Needle aspiration: no      Incision types:  Stab incision    Scalpel blade:  11    Wound management:  Probed and deloculated    Drainage:  Bloody    Drainage amount:  Scant    Wound treatment:  Wound left open  Post-procedure details:     Patient tolerance of procedure:  Tolerated well, no immediate complications               ED Course                                           MDM  Number of Diagnoses or Management Options  Abscess of left thigh: new and does not require workup     Amount and/or Complexity of Data Reviewed  Clinical lab tests: ordered    Risk of Complications, Morbidity, and/or Mortality  Presenting problems: low  Diagnostic procedures: low  Management options: low    Patient Progress  Patient progress: stable      Final diagnoses:   Abscess of left thigh            Del Arzate II, PA  11/24/20 1512       Del Arzate II, PA  11/24/20 1512

## 2020-11-27 LAB
BACTERIA SPEC AEROBE CULT: ABNORMAL
GRAM STN SPEC: ABNORMAL

## 2020-11-29 NOTE — PROGRESS NOTES
[facility]  [address]  [address]  [facility] [address]  [phone]      Patient  Name:  Alen Rosales  :  1985      Date of Visit: 2020    Chief Complaint:  weight gain; unable to maintain weight loss.   Evaluate for possible metabolic and bariatric surgery    History of Present Illness:  Alen Rosales is a 35 y.o. male who presents today for evaluation, education and consultation regarding metabolic and bariatric surgery (MBS).  Since last seen 10/30/2020 she has lost 3 pounds.The patient returns for final visit prior to metabolic and bariatric surgery specifically the sleeve gastrectomy.  Original intake evaluation Lilliana Yates PA-C dated 10/20/2020 reviewed.  She notes that his wife Nahun Hartman had sleeve gastrectomy with me and apparently is doing very well and inspired him to have surgery.  She notes he has relatively unremarkable medical history but with a strong family history of health issues.  She notes his maximum lifetime weight is 330 pounds.  She says he denies heartburn or indigestion issues but does have a sensitive stomach with dairy products.  She notes no personal history of gallbladder evaluation and that his mother had her gallbladder removed several years ago.      The patient has had issues with morbid obesity for years and only temporary success with non-surgical methods of weight loss.  The patient is seeking LSG to help with the morbid obesity related conditions of dyspnea exertion, fatigue, elevated LDL.    35-year-old morbidly obese white male from [facility].  He says he is now heard my preoperative talk twice as he was there for his wife's preoperative evaluation.  He denies any GE reflux or gallbladder symptoms, just the lactose intolerance mentioned above.  He denies any personal or family history of bleeding disorders or coagulopathies.      Past Medical History:   Diagnosis Date   • Dyspepsia    • Dyspnea on exertion     • Elevated LDL cholesterol level    • Fatigue    • Morbid obesity (CMS/HCC)      Past Surgical History:   Procedure Laterality Date   • DENTAL PROCEDURE     • NO PAST SURGERIES         No Known Allergies    Current Outpatient Medications:   •  sulfamethoxazole-trimethoprim (BACTRIM DS,SEPTRA DS) 800-160 MG per tablet, Take 2 tablets by mouth 2 (Two) Times a Day., Disp: 40 tablet, Rfl: 0    Social History     Socioeconomic History   • Marital status:      Spouse name: Not on file   • Number of children: Not on file   • Years of education: Not on file   • Highest education level: Not on file   Tobacco Use   • Smoking status: Never Smoker   • Smokeless tobacco: Never Used   Substance and Sexual Activity   • Alcohol use: Not Currently     Alcohol/week: 1.0 standard drinks     Types: 1 Shots of liquor per week     Comment: socially   • Drug use: Never   • Sexual activity: Yes     Partners: Female   Social History Narrative    Lives in [place], [place] with wife Nahun Hartman.  Works full time at [employer] as a /  for 15yrs.      Family History   Problem Relation Age of Onset   • Hypertension Mother    • Diabetes Mother    • Obesity Mother    • Sleep apnea Mother    • Coronary artery disease Father    • Hyperlipidemia Father    • Hypertension Father    • Heart attack Father    • Heart disease Father    • Drug abuse Brother    • Hypertension Brother    • COPD Maternal Grandmother    • Obesity Maternal Grandmother    • Diabetes Maternal Grandmother    • Hypertension Maternal Grandmother    • Heart disease Maternal Grandmother    • Heart attack Maternal Grandmother    • Kidney cancer Maternal Grandmother    • Cancer Maternal Grandmother         Bladder Cancer   • Hypertension Maternal Grandfather    • Stroke Maternal Grandfather    • Stroke Paternal Grandmother    • Hypertension Paternal Grandmother    • Stroke Paternal Grandfather    • Hypertension Paternal Grandfather    •  Diabetes Paternal Grandfather        Review of Systems   Constitutional: Positive for fatigue and unexpected weight gain. Negative for chills, diaphoresis, fever and unexpected weight loss.   HENT: Negative for congestion and facial swelling.    Eyes: Negative for blurred vision, double vision and discharge.   Respiratory: Negative for chest tightness, shortness of breath and stridor.    Cardiovascular: Negative for chest pain, palpitations and leg swelling.   Gastrointestinal: Negative for blood in stool.   Endocrine: Negative for polydipsia.   Genitourinary: Negative for hematuria.   Skin: Negative for color change.   Allergic/Immunologic: Negative for immunocompromised state.   Neurological: Negative for confusion.   Psychiatric/Behavioral: Negative for self-injury.       I have reviewed the ROS and confirm that it's accurate today.    Physical Exam:  Vital Signs:  Weight: 134 kg (295 lb)   Body mass index is 42.33 kg/m².  Temp: 98.2 °F (36.8 °C)   Heart Rate: 85   BP: 132/80     Physical Exam  Vitals signs reviewed.   Constitutional:       Appearance: He is well-developed.   HENT:      Head: Normocephalic and atraumatic.      Nose:      Comments: Mask, beard  Eyes:      Conjunctiva/sclera: Conjunctivae normal.      Pupils: Pupils are equal, round, and reactive to light.   Neck:      Musculoskeletal: Normal range of motion and neck supple.      Thyroid: No thyromegaly.      Vascular: No carotid bruit.      Trachea: No tracheal deviation.   Cardiovascular:      Rate and Rhythm: Normal rate and regular rhythm.      Heart sounds: Normal heart sounds.   Pulmonary:      Effort: Pulmonary effort is normal. No respiratory distress.      Breath sounds: Normal breath sounds.   Abdominal:      General: There is no distension.      Palpations: Abdomen is soft.      Tenderness: There is no abdominal tenderness.      Comments: Large protuberant abdomen   Musculoskeletal: Normal range of motion.         General: No deformity.    Skin:     General: Skin is warm and dry.      Findings: No rash.      Comments: Scattered tattoos   Neurological:      Mental Status: He is alert and oriented to person, place, and time.      Cranial Nerves: No cranial nerve deficit.      Coordination: Coordination normal.   Psychiatric:         Behavior: Behavior normal.         Thought Content: Thought content normal.         Judgment: Judgment normal.         Patient Active Problem List   Diagnosis   • Morbid obesity (CMS/[facility])   • Dyspepsia   • Encounter for preoperative assessment for noncoronary cardiac surgery   • Morbid obesity with body mass index (BMI) of 40.0 to 44.9 in adult (CMS/[facility])       Assessment:    Alen Rosales is a 35 y.o. year old male with medically complicated obesity.    Metabolic and bariatric surgery is deemed medically necessary given the following obesity related comorbidities including dyspnea exertion, fatigue, and elevated LDL with current Weight: 134 kg (295 lb) and Body mass index is 42.33 kg/m²..    Encounter Diagnosis   Name Primary?   • Morbid obesity with body mass index (BMI) of 40.0 to 44.9 in adult (CMS/[facility]) Yes      Patient is aware that surgery is a tool, and that weight loss and improvement in comorbidities is not guaranteed but only seen in the context of appropriate use, follow up and physical activity.    The patient was present for an approximately a 2.5 hour discussion of the purpose of MBS, how MBS is a tool to assist in achieving weight loss goals, the most common complications and how best to avoid them, and the strategies for short and long term weight loss and improvement in comorbidities.  Ample opportunity to discuss questions was available both in group and during the time of individual examination.    I reviewed his Aubrey report which is negative.  Chest x-ray dated 10/31/2020 no active process.  EKG dated 10/30/2020 normal sinus rhythm with right axis deviation.  CBC and CMP dated 10/30/2020 unremarkable  "except for CO2 of 30.6 of note H&H 15.4 and 45.5.  Psychosocial evaluation dated 6/15/2020 and 8/5/2020 Parish Coles, PhD appropriate candidate.  Dietitian evaluation dated 8/13/2020 Ethel reynolds RD noting that the patient knows many principles of post bariatric diet as well as diet for reasonable weight management prior to surgery as his wife had sleeve gastrectomy about a year ago.  Negative H. pylori breath test dated 10/20/2020.  Labs dated 6/19/2020 showing an unremarkable CBC, unremarkable CMP except for BUN of 23, normal lipid panel except for an LDL of 120, normal TSH.  Letter of support primary provider Dian Leonard DO dated 10/6/2020.  Please see scanned records that I have reviewed and signed off on today.  All of this in addition to the patient's unique history and exam has been taken into consideration in determining their appropriate candidacy for MBS.    Complications  of laparoscopic/possible robotic gastric sleeve were discussed. The patient is well aware of the potential complications of surgery that include but not limited to bleeding, infections, deep venous thrombosis, pulmonary embolism, pulmonary complications such as pneumonia, cardiac events, hernias, small bowel obstruction, damage to the spleen or other organs, bowel injury, disfiguring scars, failure to lose weight, need for additional surgery, conversion to an open procedure, and death. Patient is also aware of complications which apply in this particular procedure that can include but are not limited to a \"leak\" at the staple line which in some instances may require conversion to gastric bypass.    The patient is aware if a hiatal hernia is encountered, it likely will be repaired.  R/B/A Rx to hiatal hernia repair were discussed as outlined in our long consent form.  Briefly risks in addition to those for LSG include recurrent hernia, TULIO, dysphagia, esophageal injury, pneumothorax, injury to the vagus nerves, injury to the thoracic " duct, aorta or vena cava.    I discussed avoiding all tobacco products and second hand smoke at least 2 weeks pre-operatively and 6 weeks post-operatively to minimize the risk of sleeve leak.  This included discussing the importance of avoiding even secondhand smoke as the risk of leak is increased.  Examples discussed:  I made it very clear that the patient understands they should avoid even riding in a car where someone has previously smoked in the last 2 weeks, living in a house where someone smokes (even if it's in a separate room/patio/attached garage, etc.) we discussed that they should not have a conversation with a group of people who are smoking even if it's outside.  They can be around wood burning fires and barbecue.  I told them I do not know if marijuana has a same effects but my overall recommendation is to avoid it for 2 weeks prior in 6 weeks after surgery.  They also are aware that nicotine may also increase the risk of leak and I strongly encouraged him to avoid that as well for 2 weeks prior in 6 weeks after surgery.    Discussed the risks, benefits and alternative therapies at great length as outlined in our extensive consent forms, consent videos, and educational teaching process under the direction of the center's .    A copy of the patient's signed informed consent is on file.    R/B/A Rx discussed to postop anticoagulation incl but not limited to bleeding, drug reaction, venothromboembolic events, etc. and the patient declined.        Plan: After evaluation today I think the patient is a reasonable candidate for laparoscopic sleeve gastrectomy.  At the time I saw him plans were to perform this at [facility] but since then elective overnight surgery is on hold secondary to Covid issues.  Other issues include dyspnea on exertion, fatigue, and elevated LDL.  Thank you Dr. Leonard for the opportunity evaluate Mr. Rosales.        Vince Jean MD

## 2020-12-03 ENCOUNTER — TELEPHONE (OUTPATIENT)
Dept: BARIATRICS/WEIGHT MGMT | Facility: CLINIC | Age: 35
End: 2020-12-03

## 2020-12-03 RX ORDER — SODIUM CHLORIDE 0.9 % (FLUSH) 0.9 %
3-10 SYRINGE (ML) INJECTION AS NEEDED
Status: CANCELLED | OUTPATIENT
Start: 2020-12-03

## 2020-12-03 RX ORDER — CHLORHEXIDINE GLUCONATE 0.12 MG/ML
30 RINSE ORAL
Status: CANCELLED | OUTPATIENT
Start: 2020-12-03 | End: 2020-12-03

## 2020-12-03 RX ORDER — SODIUM CHLORIDE 0.9 % (FLUSH) 0.9 %
3 SYRINGE (ML) INJECTION EVERY 12 HOURS SCHEDULED
Status: CANCELLED | OUTPATIENT
Start: 2020-12-03

## 2020-12-03 RX ORDER — SCOLOPAMINE TRANSDERMAL SYSTEM 1 MG/1
1 PATCH, EXTENDED RELEASE TRANSDERMAL ONCE
Status: CANCELLED | OUTPATIENT
Start: 2020-12-03 | End: 2020-12-03

## 2020-12-03 RX ORDER — SODIUM CHLORIDE, SODIUM LACTATE, POTASSIUM CHLORIDE, CALCIUM CHLORIDE 600; 310; 30; 20 MG/100ML; MG/100ML; MG/100ML; MG/100ML
150 INJECTION, SOLUTION INTRAVENOUS CONTINUOUS
Status: CANCELLED | OUTPATIENT
Start: 2020-12-03

## 2020-12-03 RX ORDER — ACETAMINOPHEN 500 MG
1000 TABLET ORAL ONCE
Status: CANCELLED | OUTPATIENT
Start: 2020-12-03 | End: 2020-12-03

## 2020-12-03 RX ORDER — PANTOPRAZOLE SODIUM 40 MG/10ML
40 INJECTION, POWDER, LYOPHILIZED, FOR SOLUTION INTRAVENOUS ONCE
Status: CANCELLED | OUTPATIENT
Start: 2020-12-03 | End: 2020-12-03

## 2020-12-03 NOTE — TELEPHONE ENCOUNTER
Patient called, he states his surgery was scheduled for 12/9 in [place] but that date was moved to 12/11 in [place], he states he has heard nothing regarding his PAT.  He is asking for a return call.    Thank you.

## 2020-12-09 ENCOUNTER — APPOINTMENT (OUTPATIENT)
Dept: PREADMISSION TESTING | Facility: HOSPITAL | Age: 35
End: 2020-12-09

## 2020-12-09 LAB
DEPRECATED RDW RBC AUTO: 39.8 FL (ref 37–54)
ERYTHROCYTE [DISTWIDTH] IN BLOOD BY AUTOMATED COUNT: 13.1 % (ref 12.3–15.4)
HBA1C MFR BLD: 5.6 % (ref 4.8–5.6)
HCT VFR BLD AUTO: 46.2 % (ref 37.5–51)
HGB BLD-MCNC: 14.8 G/DL (ref 13–17.7)
MCH RBC QN AUTO: 27.2 PG (ref 26.6–33)
MCHC RBC AUTO-ENTMCNC: 32 G/DL (ref 31.5–35.7)
MCV RBC AUTO: 84.8 FL (ref 79–97)
PLATELET # BLD AUTO: 249 10*3/MM3 (ref 140–450)
PMV BLD AUTO: 10 FL (ref 6–12)
RBC # BLD AUTO: 5.45 10*6/MM3 (ref 4.14–5.8)
SARS-COV-2 RNA RESP QL NAA+PROBE: NOT DETECTED
WBC # BLD AUTO: 5.92 10*3/MM3 (ref 3.4–10.8)

## 2020-12-09 PROCEDURE — U0004 COV-19 TEST NON-CDC HGH THRU: HCPCS

## 2020-12-09 PROCEDURE — 83036 HEMOGLOBIN GLYCOSYLATED A1C: CPT

## 2020-12-09 PROCEDURE — 85027 COMPLETE CBC AUTOMATED: CPT

## 2020-12-09 PROCEDURE — C9803 HOPD COVID-19 SPEC COLLECT: HCPCS

## 2020-12-09 PROCEDURE — 36415 COLL VENOUS BLD VENIPUNCTURE: CPT

## 2020-12-09 NOTE — PAT
Patient to apply Chlorhexadine wipes  to surgical area (as instructed) the night before procedure and the AM of procedure. Wipes provided.    Patient instructed to drink 20 ounces (or until full) of Gatorade and it needs to be completed 1 hour before given arrival time for procedure (NO RED Gatorade)    Patient verbalized understanding.    EKG AND CXRAY IN CHART FROM 10/30/2020

## 2020-12-10 ENCOUNTER — ANESTHESIA EVENT (OUTPATIENT)
Dept: PERIOP | Facility: HOSPITAL | Age: 35
End: 2020-12-10

## 2020-12-10 RX ORDER — FAMOTIDINE 10 MG/ML
20 INJECTION, SOLUTION INTRAVENOUS ONCE
Status: CANCELLED | OUTPATIENT
Start: 2020-12-10 | End: 2020-12-10

## 2020-12-10 RX ORDER — FAMOTIDINE 20 MG/1
20 TABLET, FILM COATED ORAL ONCE
Status: CANCELLED | OUTPATIENT
Start: 2020-12-10 | End: 2020-12-10

## 2020-12-11 ENCOUNTER — HOSPITAL ENCOUNTER (INPATIENT)
Facility: HOSPITAL | Age: 35
LOS: 1 days | Discharge: HOME | End: 2020-12-12
Attending: SURGERY | Admitting: SURGERY

## 2020-12-11 ENCOUNTER — ANESTHESIA (OUTPATIENT)
Dept: PERIOP | Facility: HOSPITAL | Age: 35
End: 2020-12-11

## 2020-12-11 DIAGNOSIS — E66.01 MORBID OBESITY WITH BODY MASS INDEX (BMI) OF 40.0 TO 44.9 IN ADULT (HCC): ICD-10-CM

## 2020-12-11 PROCEDURE — 25010000002 BUPRENORPHINE PER 0.1 MG: Performed by: ANESTHESIOLOGY

## 2020-12-11 PROCEDURE — 25010000002 ENOXAPARIN PER 10 MG: Performed by: SURGERY

## 2020-12-11 PROCEDURE — 25010000002 HYDROMORPHONE PER 4 MG: Performed by: NURSE ANESTHETIST, CERTIFIED REGISTERED

## 2020-12-11 PROCEDURE — 25010000002 ONDANSETRON PER 1 MG: Performed by: SURGERY

## 2020-12-11 PROCEDURE — 25010000002 ONDANSETRON PER 1 MG: Performed by: ANESTHESIOLOGY

## 2020-12-11 PROCEDURE — 25010000002 PROPOFOL 10 MG/ML EMULSION: Performed by: NURSE ANESTHETIST, CERTIFIED REGISTERED

## 2020-12-11 PROCEDURE — 0DJ08ZZ INSPECTION OF UPPER INTESTINAL TRACT, VIA NATURAL OR ARTIFICIAL OPENING ENDOSCOPIC: ICD-10-PCS | Performed by: SURGERY

## 2020-12-11 PROCEDURE — 43775 LAP SLEEVE GASTRECTOMY: CPT | Performed by: SURGERY

## 2020-12-11 PROCEDURE — 0DB64Z3 EXCISION OF STOMACH, PERCUTANEOUS ENDOSCOPIC APPROACH, VERTICAL: ICD-10-PCS | Performed by: SURGERY

## 2020-12-11 PROCEDURE — 25010000002 CEFAZOLIN PER 500 MG: Performed by: SURGERY

## 2020-12-11 PROCEDURE — 25010000002 DEXAMETHASONE SODIUM PHOSPHATE 10 MG/ML SOLUTION: Performed by: NURSE ANESTHETIST, CERTIFIED REGISTERED

## 2020-12-11 PROCEDURE — 25010000002 NEOSTIGMINE 10 MG/10ML SOLUTION: Performed by: NURSE ANESTHETIST, CERTIFIED REGISTERED

## 2020-12-11 PROCEDURE — 25010000002 FENTANYL CITRATE (PF) 100 MCG/2ML SOLUTION: Performed by: NURSE ANESTHETIST, CERTIFIED REGISTERED

## 2020-12-11 PROCEDURE — 25010000002 DEXAMETHASONE SODIUM PHOSPHATE 10 MG/ML SOLUTION: Performed by: ANESTHESIOLOGY

## 2020-12-11 PROCEDURE — 0BQT4ZZ REPAIR DIAPHRAGM, PERCUTANEOUS ENDOSCOPIC APPROACH: ICD-10-PCS | Performed by: SURGERY

## 2020-12-11 PROCEDURE — 88307 TISSUE EXAM BY PATHOLOGIST: CPT | Performed by: SURGERY

## 2020-12-11 PROCEDURE — 94799 UNLISTED PULMONARY SVC/PX: CPT

## 2020-12-11 DEVICE — BLACK REINFORCED INTELLIGENT RELOAD, FOR USE WITH SIGNIA STAPLING SYSTEM
Type: IMPLANTABLE DEVICE | Site: STOMACH | Status: FUNCTIONAL
Brand: TRI-STAPLE 2.0

## 2020-12-11 DEVICE — ABSORBABLE WOUND CLOSURE DEVICE
Type: IMPLANTABLE DEVICE | Site: STOMACH | Status: FUNCTIONAL
Brand: V-LOC 180

## 2020-12-11 DEVICE — FLOSEAL HEMOSTATIC MATRIX, 5ML
Type: IMPLANTABLE DEVICE | Site: STOMACH | Status: FUNCTIONAL
Brand: FLOSEAL HEMOSTATIC MATRIX

## 2020-12-11 DEVICE — PBT NON ABSORBABLE WOUND CLOSURE DEVICE
Type: IMPLANTABLE DEVICE | Site: STOMACH | Status: FUNCTIONAL
Brand: V-LOC

## 2020-12-11 DEVICE — REINFORCED INTELLIGENT RELOAD, FOR USE WITH SIGNIA STAPLING SYSTEM
Type: IMPLANTABLE DEVICE | Site: STOMACH | Status: FUNCTIONAL
Brand: TRI-STAPLE 2.0

## 2020-12-11 RX ORDER — PROMETHAZINE HYDROCHLORIDE 12.5 MG/1
12.5 TABLET ORAL EVERY 6 HOURS PRN
Status: DISCONTINUED | OUTPATIENT
Start: 2020-12-11 | End: 2020-12-12 | Stop reason: HOSPADM

## 2020-12-11 RX ORDER — CEFAZOLIN SODIUM IN 0.9 % NACL 3 G/100 ML
3 INTRAVENOUS SOLUTION, PIGGYBACK (ML) INTRAVENOUS EVERY 8 HOURS
Status: COMPLETED | OUTPATIENT
Start: 2020-12-11 | End: 2020-12-12

## 2020-12-11 RX ORDER — SODIUM CHLORIDE, SODIUM LACTATE, POTASSIUM CHLORIDE, CALCIUM CHLORIDE 600; 310; 30; 20 MG/100ML; MG/100ML; MG/100ML; MG/100ML
9 INJECTION, SOLUTION INTRAVENOUS CONTINUOUS
Status: DISCONTINUED | OUTPATIENT
Start: 2020-12-11 | End: 2020-12-11 | Stop reason: HOSPADM

## 2020-12-11 RX ORDER — ROCURONIUM BROMIDE 10 MG/ML
INJECTION, SOLUTION INTRAVENOUS AS NEEDED
Status: DISCONTINUED | OUTPATIENT
Start: 2020-12-11 | End: 2020-12-11 | Stop reason: SURG

## 2020-12-11 RX ORDER — SODIUM CHLORIDE 9 MG/ML
INJECTION, SOLUTION INTRAVENOUS AS NEEDED
Status: DISCONTINUED | OUTPATIENT
Start: 2020-12-11 | End: 2020-12-11 | Stop reason: HOSPADM

## 2020-12-11 RX ORDER — PANTOPRAZOLE SODIUM 40 MG/10ML
40 INJECTION, POWDER, LYOPHILIZED, FOR SOLUTION INTRAVENOUS ONCE
Status: COMPLETED | OUTPATIENT
Start: 2020-12-11 | End: 2020-12-11

## 2020-12-11 RX ORDER — ONDANSETRON 2 MG/ML
4 INJECTION INTRAMUSCULAR; INTRAVENOUS EVERY 6 HOURS
Status: COMPLETED | OUTPATIENT
Start: 2020-12-11 | End: 2020-12-12

## 2020-12-11 RX ORDER — HYDRALAZINE HYDROCHLORIDE 20 MG/ML
10 INJECTION INTRAMUSCULAR; INTRAVENOUS
Status: DISCONTINUED | OUTPATIENT
Start: 2020-12-11 | End: 2020-12-12 | Stop reason: HOSPADM

## 2020-12-11 RX ORDER — ONDANSETRON 4 MG/1
4 TABLET, FILM COATED ORAL EVERY 6 HOURS PRN
Status: DISCONTINUED | OUTPATIENT
Start: 2020-12-12 | End: 2020-12-12 | Stop reason: HOSPADM

## 2020-12-11 RX ORDER — SIMETHICONE 80 MG
80 TABLET,CHEWABLE ORAL 4 TIMES DAILY PRN
Status: DISCONTINUED | OUTPATIENT
Start: 2020-12-11 | End: 2020-12-12 | Stop reason: HOSPADM

## 2020-12-11 RX ORDER — NALOXONE HCL 0.4 MG/ML
0.1 VIAL (ML) INJECTION
Status: DISCONTINUED | OUTPATIENT
Start: 2020-12-11 | End: 2020-12-12 | Stop reason: HOSPADM

## 2020-12-11 RX ORDER — SODIUM CHLORIDE, SODIUM LACTATE, POTASSIUM CHLORIDE, CALCIUM CHLORIDE 600; 310; 30; 20 MG/100ML; MG/100ML; MG/100ML; MG/100ML
150 INJECTION, SOLUTION INTRAVENOUS CONTINUOUS
Status: DISCONTINUED | OUTPATIENT
Start: 2020-12-11 | End: 2020-12-11 | Stop reason: HOSPADM

## 2020-12-11 RX ORDER — METOCLOPRAMIDE HYDROCHLORIDE 5 MG/ML
10 INJECTION INTRAMUSCULAR; INTRAVENOUS EVERY 6 HOURS PRN
Status: DISCONTINUED | OUTPATIENT
Start: 2020-12-11 | End: 2020-12-12 | Stop reason: HOSPADM

## 2020-12-11 RX ORDER — ACETAMINOPHEN 500 MG
1000 TABLET ORAL ONCE
Status: COMPLETED | OUTPATIENT
Start: 2020-12-11 | End: 2020-12-11

## 2020-12-11 RX ORDER — PROCHLORPERAZINE MALEATE 10 MG
10 TABLET ORAL EVERY 6 HOURS PRN
Status: DISCONTINUED | OUTPATIENT
Start: 2020-12-11 | End: 2020-12-12 | Stop reason: HOSPADM

## 2020-12-11 RX ORDER — FENTANYL CITRATE 50 UG/ML
INJECTION, SOLUTION INTRAMUSCULAR; INTRAVENOUS AS NEEDED
Status: DISCONTINUED | OUTPATIENT
Start: 2020-12-11 | End: 2020-12-11 | Stop reason: SURG

## 2020-12-11 RX ORDER — ACETAMINOPHEN 500 MG
1000 TABLET ORAL EVERY 8 HOURS SCHEDULED
Status: DISCONTINUED | OUTPATIENT
Start: 2020-12-11 | End: 2020-12-12 | Stop reason: HOSPADM

## 2020-12-11 RX ORDER — HYDROMORPHONE HYDROCHLORIDE 2 MG/1
2 TABLET ORAL EVERY 4 HOURS PRN
Status: DISCONTINUED | OUTPATIENT
Start: 2020-12-11 | End: 2020-12-12 | Stop reason: HOSPADM

## 2020-12-11 RX ORDER — ONDANSETRON 2 MG/ML
4 INJECTION INTRAMUSCULAR; INTRAVENOUS ONCE
Status: COMPLETED | OUTPATIENT
Start: 2020-12-11 | End: 2020-12-11

## 2020-12-11 RX ORDER — PROPOFOL 10 MG/ML
VIAL (ML) INTRAVENOUS AS NEEDED
Status: DISCONTINUED | OUTPATIENT
Start: 2020-12-11 | End: 2020-12-11 | Stop reason: SURG

## 2020-12-11 RX ORDER — SODIUM CHLORIDE 0.9 % (FLUSH) 0.9 %
10 SYRINGE (ML) INJECTION EVERY 12 HOURS SCHEDULED
Status: DISCONTINUED | OUTPATIENT
Start: 2020-12-11 | End: 2020-12-11 | Stop reason: HOSPADM

## 2020-12-11 RX ORDER — MAGNESIUM HYDROXIDE 1200 MG/15ML
LIQUID ORAL AS NEEDED
Status: DISCONTINUED | OUTPATIENT
Start: 2020-12-11 | End: 2020-12-11 | Stop reason: HOSPADM

## 2020-12-11 RX ORDER — ALPRAZOLAM 0.25 MG/1
0.25 TABLET ORAL ONCE AS NEEDED
Status: DISCONTINUED | OUTPATIENT
Start: 2020-12-11 | End: 2020-12-12 | Stop reason: HOSPADM

## 2020-12-11 RX ORDER — ALBUTEROL SULFATE 2.5 MG/3ML
2.5 SOLUTION RESPIRATORY (INHALATION) EVERY 4 HOURS PRN
Status: DISCONTINUED | OUTPATIENT
Start: 2020-12-11 | End: 2020-12-12 | Stop reason: HOSPADM

## 2020-12-11 RX ORDER — FENTANYL CITRATE 50 UG/ML
50 INJECTION, SOLUTION INTRAMUSCULAR; INTRAVENOUS
Status: DISCONTINUED | OUTPATIENT
Start: 2020-12-11 | End: 2020-12-11 | Stop reason: HOSPADM

## 2020-12-11 RX ORDER — DEXAMETHASONE SODIUM PHOSPHATE 10 MG/ML
INJECTION, SOLUTION INTRAMUSCULAR; INTRAVENOUS AS NEEDED
Status: DISCONTINUED | OUTPATIENT
Start: 2020-12-11 | End: 2020-12-11 | Stop reason: SURG

## 2020-12-11 RX ORDER — HYDROMORPHONE HYDROCHLORIDE 1 MG/ML
0.5 INJECTION, SOLUTION INTRAMUSCULAR; INTRAVENOUS; SUBCUTANEOUS
Status: DISCONTINUED | OUTPATIENT
Start: 2020-12-11 | End: 2020-12-11 | Stop reason: HOSPADM

## 2020-12-11 RX ORDER — NEOSTIGMINE METHYLSULFATE 1 MG/ML
INJECTION, SOLUTION INTRAVENOUS AS NEEDED
Status: DISCONTINUED | OUTPATIENT
Start: 2020-12-11 | End: 2020-12-11 | Stop reason: SURG

## 2020-12-11 RX ORDER — BUPRENORPHINE HYDROCHLORIDE 0.32 MG/ML
INJECTION INTRAMUSCULAR; INTRAVENOUS
Status: COMPLETED | OUTPATIENT
Start: 2020-12-11 | End: 2020-12-11

## 2020-12-11 RX ORDER — LORAZEPAM 2 MG/ML
0.5 INJECTION INTRAMUSCULAR EVERY 12 HOURS PRN
Status: DISCONTINUED | OUTPATIENT
Start: 2020-12-11 | End: 2020-12-12 | Stop reason: HOSPADM

## 2020-12-11 RX ORDER — LIDOCAINE HYDROCHLORIDE 10 MG/ML
INJECTION, SOLUTION EPIDURAL; INFILTRATION; INTRACAUDAL; PERINEURAL AS NEEDED
Status: DISCONTINUED | OUTPATIENT
Start: 2020-12-11 | End: 2020-12-11 | Stop reason: SURG

## 2020-12-11 RX ORDER — SODIUM CHLORIDE AND POTASSIUM CHLORIDE 150; 450 MG/100ML; MG/100ML
125 INJECTION, SOLUTION INTRAVENOUS CONTINUOUS
Status: DISCONTINUED | OUTPATIENT
Start: 2020-12-12 | End: 2020-12-12 | Stop reason: HOSPADM

## 2020-12-11 RX ORDER — SCOLOPAMINE TRANSDERMAL SYSTEM 1 MG/1
1 PATCH, EXTENDED RELEASE TRANSDERMAL ONCE
Status: DISCONTINUED | OUTPATIENT
Start: 2020-12-11 | End: 2020-12-11

## 2020-12-11 RX ORDER — PANTOPRAZOLE SODIUM 40 MG/10ML
40 INJECTION, POWDER, LYOPHILIZED, FOR SOLUTION INTRAVENOUS
Status: DISCONTINUED | OUTPATIENT
Start: 2020-12-12 | End: 2020-12-12 | Stop reason: HOSPADM

## 2020-12-11 RX ORDER — CEFAZOLIN SODIUM IN 0.9 % NACL 3 G/100 ML
3 INTRAVENOUS SOLUTION, PIGGYBACK (ML) INTRAVENOUS ONCE
Status: COMPLETED | OUTPATIENT
Start: 2020-12-11 | End: 2020-12-11

## 2020-12-11 RX ORDER — OXYCODONE HYDROCHLORIDE 5 MG/1
5 TABLET ORAL EVERY 6 HOURS PRN
Status: DISCONTINUED | OUTPATIENT
Start: 2020-12-11 | End: 2020-12-12 | Stop reason: HOSPADM

## 2020-12-11 RX ORDER — NALOXONE HCL 0.4 MG/ML
0.4 VIAL (ML) INJECTION
Status: DISCONTINUED | OUTPATIENT
Start: 2020-12-11 | End: 2020-12-12 | Stop reason: HOSPADM

## 2020-12-11 RX ORDER — GABAPENTIN 100 MG/1
100 CAPSULE ORAL 3 TIMES DAILY
Status: DISCONTINUED | OUTPATIENT
Start: 2020-12-11 | End: 2020-12-12 | Stop reason: HOSPADM

## 2020-12-11 RX ORDER — DIPHENHYDRAMINE HYDROCHLORIDE 50 MG/ML
25 INJECTION INTRAMUSCULAR; INTRAVENOUS EVERY 4 HOURS PRN
Status: DISCONTINUED | OUTPATIENT
Start: 2020-12-11 | End: 2020-12-12 | Stop reason: HOSPADM

## 2020-12-11 RX ORDER — CHLORHEXIDINE GLUCONATE 0.12 MG/ML
30 RINSE ORAL
Status: COMPLETED | OUTPATIENT
Start: 2020-12-11 | End: 2020-12-11

## 2020-12-11 RX ORDER — SODIUM CHLORIDE 0.9 % (FLUSH) 0.9 %
10 SYRINGE (ML) INJECTION AS NEEDED
Status: DISCONTINUED | OUTPATIENT
Start: 2020-12-11 | End: 2020-12-11 | Stop reason: HOSPADM

## 2020-12-11 RX ORDER — SODIUM CHLORIDE, SODIUM LACTATE, POTASSIUM CHLORIDE, CALCIUM CHLORIDE 600; 310; 30; 20 MG/100ML; MG/100ML; MG/100ML; MG/100ML
150 INJECTION, SOLUTION INTRAVENOUS CONTINUOUS
Status: DISCONTINUED | OUTPATIENT
Start: 2020-12-11 | End: 2020-12-12 | Stop reason: HOSPADM

## 2020-12-11 RX ORDER — GLYCOPYRROLATE 0.2 MG/ML
INJECTION INTRAMUSCULAR; INTRAVENOUS AS NEEDED
Status: DISCONTINUED | OUTPATIENT
Start: 2020-12-11 | End: 2020-12-11 | Stop reason: SURG

## 2020-12-11 RX ORDER — BUPIVACAINE HYDROCHLORIDE 2.5 MG/ML
INJECTION, SOLUTION EPIDURAL; INFILTRATION; INTRACAUDAL
Status: COMPLETED | OUTPATIENT
Start: 2020-12-11 | End: 2020-12-11

## 2020-12-11 RX ORDER — DEXAMETHASONE SODIUM PHOSPHATE 10 MG/ML
INJECTION, SOLUTION INTRAMUSCULAR; INTRAVENOUS
Status: COMPLETED | OUTPATIENT
Start: 2020-12-11 | End: 2020-12-11

## 2020-12-11 RX ORDER — LORAZEPAM 1 MG/1
1 TABLET ORAL EVERY 12 HOURS PRN
Status: DISCONTINUED | OUTPATIENT
Start: 2020-12-11 | End: 2020-12-12 | Stop reason: HOSPADM

## 2020-12-11 RX ORDER — CYANOCOBALAMIN 1000 UG/ML
1000 INJECTION, SOLUTION INTRAMUSCULAR; SUBCUTANEOUS ONCE
Status: COMPLETED | OUTPATIENT
Start: 2020-12-12 | End: 2020-12-12

## 2020-12-11 RX ORDER — MORPHINE SULFATE 4 MG/ML
4 INJECTION, SOLUTION INTRAMUSCULAR; INTRAVENOUS
Status: DISCONTINUED | OUTPATIENT
Start: 2020-12-11 | End: 2020-12-12 | Stop reason: HOSPADM

## 2020-12-11 RX ORDER — LIDOCAINE HYDROCHLORIDE 10 MG/ML
0.5 INJECTION, SOLUTION EPIDURAL; INFILTRATION; INTRACAUDAL; PERINEURAL ONCE AS NEEDED
Status: COMPLETED | OUTPATIENT
Start: 2020-12-11 | End: 2020-12-11

## 2020-12-11 RX ORDER — SODIUM CHLORIDE 0.9 % (FLUSH) 0.9 %
3-10 SYRINGE (ML) INJECTION AS NEEDED
Status: DISCONTINUED | OUTPATIENT
Start: 2020-12-11 | End: 2020-12-11 | Stop reason: HOSPADM

## 2020-12-11 RX ORDER — GABAPENTIN 300 MG/1
600 CAPSULE ORAL ONCE
Status: COMPLETED | OUTPATIENT
Start: 2020-12-11 | End: 2020-12-11

## 2020-12-11 RX ORDER — SODIUM CHLORIDE 0.9 % (FLUSH) 0.9 %
3 SYRINGE (ML) INJECTION EVERY 12 HOURS SCHEDULED
Status: DISCONTINUED | OUTPATIENT
Start: 2020-12-11 | End: 2020-12-11 | Stop reason: HOSPADM

## 2020-12-11 RX ADMIN — OXYCODONE HYDROCHLORIDE 5 MG: 5 TABLET ORAL at 21:11

## 2020-12-11 RX ADMIN — SCOPALAMINE 1 PATCH: 1 PATCH, EXTENDED RELEASE TRANSDERMAL at 06:59

## 2020-12-11 RX ADMIN — OXYCODONE HYDROCHLORIDE 5 MG: 5 TABLET ORAL at 12:02

## 2020-12-11 RX ADMIN — SODIUM CHLORIDE, POTASSIUM CHLORIDE, SODIUM LACTATE AND CALCIUM CHLORIDE 150 ML/HR: 600; 310; 30; 20 INJECTION, SOLUTION INTRAVENOUS at 21:11

## 2020-12-11 RX ADMIN — LIDOCAINE HYDROCHLORIDE 0.5 ML: 10 INJECTION, SOLUTION EPIDURAL; INFILTRATION; INTRACAUDAL; PERINEURAL at 06:38

## 2020-12-11 RX ADMIN — ONDANSETRON 4 MG: 2 INJECTION INTRAMUSCULAR; INTRAVENOUS at 08:59

## 2020-12-11 RX ADMIN — HYDROMORPHONE HYDROCHLORIDE 2 MG: 2 TABLET ORAL at 17:31

## 2020-12-11 RX ADMIN — DEXAMETHASONE SODIUM PHOSPHATE 2 MG: 10 INJECTION INTRAMUSCULAR; INTRAVENOUS at 07:49

## 2020-12-11 RX ADMIN — SODIUM CHLORIDE, POTASSIUM CHLORIDE, SODIUM LACTATE AND CALCIUM CHLORIDE 150 ML/HR: 600; 310; 30; 20 INJECTION, SOLUTION INTRAVENOUS at 07:04

## 2020-12-11 RX ADMIN — SODIUM CHLORIDE, PRESERVATIVE FREE 10 ML: 5 INJECTION INTRAVENOUS at 06:59

## 2020-12-11 RX ADMIN — GLYCOPYRROLATE 0.6 MG: 0.2 INJECTION INTRAMUSCULAR; INTRAVENOUS at 09:08

## 2020-12-11 RX ADMIN — GABAPENTIN 100 MG: 100 CAPSULE ORAL at 21:06

## 2020-12-11 RX ADMIN — SIMETHICONE 80 MG: 80 TABLET, CHEWABLE ORAL at 12:01

## 2020-12-11 RX ADMIN — ONDANSETRON 4 MG: 2 INJECTION INTRAMUSCULAR; INTRAVENOUS at 09:35

## 2020-12-11 RX ADMIN — DEXAMETHASONE SODIUM PHOSPHATE 8 MG: 10 INJECTION INTRAMUSCULAR; INTRAVENOUS at 07:38

## 2020-12-11 RX ADMIN — ROCURONIUM BROMIDE 50 MG: 10 INJECTION INTRAVENOUS at 07:34

## 2020-12-11 RX ADMIN — CHLORHEXIDINE GLUCONATE 0.12% ORAL RINSE 30 ML: 1.2 LIQUID ORAL at 06:57

## 2020-12-11 RX ADMIN — ONDANSETRON 4 MG: 2 INJECTION INTRAMUSCULAR; INTRAVENOUS at 21:06

## 2020-12-11 RX ADMIN — ROCURONIUM BROMIDE 10 MG: 10 INJECTION INTRAVENOUS at 08:40

## 2020-12-11 RX ADMIN — SODIUM CHLORIDE, POTASSIUM CHLORIDE, SODIUM LACTATE AND CALCIUM CHLORIDE: 600; 310; 30; 20 INJECTION, SOLUTION INTRAVENOUS at 08:49

## 2020-12-11 RX ADMIN — ONDANSETRON 4 MG: 2 INJECTION INTRAMUSCULAR; INTRAVENOUS at 14:56

## 2020-12-11 RX ADMIN — SODIUM CHLORIDE, POTASSIUM CHLORIDE, SODIUM LACTATE AND CALCIUM CHLORIDE 1000 ML: 600; 310; 30; 20 INJECTION, SOLUTION INTRAVENOUS at 06:55

## 2020-12-11 RX ADMIN — FENTANYL CITRATE 100 MCG: 50 INJECTION, SOLUTION INTRAMUSCULAR; INTRAVENOUS at 07:34

## 2020-12-11 RX ADMIN — HYDROMORPHONE HYDROCHLORIDE 0.5 MG: 1 INJECTION, SOLUTION INTRAMUSCULAR; INTRAVENOUS; SUBCUTANEOUS at 09:32

## 2020-12-11 RX ADMIN — LIDOCAINE HYDROCHLORIDE 50 MG: 10 INJECTION, SOLUTION EPIDURAL; INFILTRATION; INTRACAUDAL; PERINEURAL at 07:34

## 2020-12-11 RX ADMIN — CHLORHEXIDINE GLUCONATE 0.12% ORAL RINSE 30 ML: 1.2 LIQUID ORAL at 07:04

## 2020-12-11 RX ADMIN — ACETAMINOPHEN 1000 MG: 500 TABLET, FILM COATED ORAL at 21:06

## 2020-12-11 RX ADMIN — Medication 3 G: at 07:33

## 2020-12-11 RX ADMIN — BUPIVACAINE HYDROCHLORIDE 60 ML: 2.5 INJECTION, SOLUTION EPIDURAL; INFILTRATION; INTRACAUDAL; PERINEURAL at 07:49

## 2020-12-11 RX ADMIN — SIMETHICONE 80 MG: 80 TABLET, CHEWABLE ORAL at 19:11

## 2020-12-11 RX ADMIN — CEFAZOLIN SODIUM 3 G: 10 INJECTION, POWDER, FOR SOLUTION INTRAVENOUS at 14:56

## 2020-12-11 RX ADMIN — PANTOPRAZOLE SODIUM 40 MG: 40 INJECTION, POWDER, FOR SOLUTION INTRAVENOUS at 06:59

## 2020-12-11 RX ADMIN — GABAPENTIN 100 MG: 100 CAPSULE ORAL at 14:56

## 2020-12-11 RX ADMIN — PROPOFOL 200 MG: 10 INJECTION, EMULSION INTRAVENOUS at 07:34

## 2020-12-11 RX ADMIN — GABAPENTIN 600 MG: 300 CAPSULE ORAL at 06:56

## 2020-12-11 RX ADMIN — ALBUTEROL SULFATE 2.5 MG: 2.5 SOLUTION RESPIRATORY (INHALATION) at 08:31

## 2020-12-11 RX ADMIN — ACETAMINOPHEN 1000 MG: 500 TABLET ORAL at 06:56

## 2020-12-11 RX ADMIN — NEOSTIGMINE 4 MG: 1 INJECTION INTRAVENOUS at 09:08

## 2020-12-11 RX ADMIN — BUPRENORPHINE HYDROCHLORIDE 0.3 MG: 0.32 INJECTION INTRAMUSCULAR; INTRAVENOUS at 07:49

## 2020-12-11 RX ADMIN — ACETAMINOPHEN 1000 MG: 500 TABLET, FILM COATED ORAL at 14:56

## 2020-12-11 NOTE — OP NOTE
Preoperative Diagnosis:                                 Morbid Obesity with Multiple Co-Morbidities                                                                              Postoperative Diagnosis:                                Same, weakness at hiatus     Procedure:                                                              Laparoscopic Sleeve Gastrectomy (85% subtotal vertical gastrectomy)                                                                        Laparoscopic hiatal hernia repair (not paraesophageal)                                                                        EGD     Surgeon:                                                       LEEROY Jean MD     Anesthesia:                                                   GETA     EBL:                                                              Minimal     Fluids:                                                           Crystalloid     Specimens:                                                   Subtotal gastrectomy     Drains:                                                           None     Counts:                                                          Correct     Complications:                                               None     Indications:   This is a 35-year-old morbidly obese white male who presents for elective laparoscopic sleeve gastrectomy.  I did his wife  s LSG a couple years ago.   He's undergone extensive preoperative education teaching and consent process everything's in order and he wishes to proceed.     Operative technique:      The patient was brought to the operating room, and placed supine upon the operating room table.  SCD hose were placed, he underwent uneventful general endotracheal anesthesia per the anesthesiology staff, he received IV Ancef and subcutaneous Lovenox, the anesthesiology staff performed a tap block, and his abdomen was prepped and draped with ChloraPrep in a sterile fashion, an Ioban was used  as well, a Galvez catheter was not placed. Large upper abdominal tattoo noted.      The peritoneal cavity was entered high in the left midclavicular line using a 5mm trocar and an Optiview technique and the abdomen was insufflated to a pressure of 15 mmHg with CO2 gas.  Exploratory laparoscopy revealed no evidence of injury from the entrance technique, a normal-appearing liver, omentum flipped up over the right liver without adhesions, after reduction a normal-appearing gallbladder,  a mild rectus diastases, no other abnormalities noted.  Remaining trocars were placed under direct visualization including a 5 mm trocar in the left subcostal position, a 12 mm fascial splitting trocar a few centimeters above and to the right of the umbilicus and a 15 mm trocar across from this to the left of midline.  Through a stab incision in the epigastrium a Thea retractor was used to elevate the left lobe of the liver exposing the hiatus.    Incidentally the liver was very floppy and lateral to the retractor would easily become ischemic but responded nicely to repositioning.  No hiatal hernia appreciated from the anterior view and this was photodocumented.  Beginning approximately two thirds of the way around the greater curvature the stomach, the gastrocolic vessels were divided with the  LigaSure device. This proceeded proximally taking down all the short gastric vessels and exposing the left lynne, no obvious hiatal hernia noted but slight weakness.  The area was photodocumented.   Gastrocolic vessels were then divided medially to a few cm proximal to the pylorus.  Some of the filmy attachments of the posterior stomach to the pancreas and retroperitoneum were divided.  The stomach was marked with a kitner saturated with a marking pen 1 cm lateral to the angle of His, 3 cm away from the angularis, and 6 cm from the pylorus.  The 22 cm bariatric standard clamp was then positioned just inside these markings.  The 85% subtotal  vertical sleeve gastrectomy was then performed using a Fliplingo articulating electronic linear stapler with dual absorbable strips.  The first firing was a 60 mm black load, the next 3 firings were 60 mm purple loads.  The OG and then the standard clamp were removed.  The sleeve was performed such that it was uniform in size, no hourglassing or narrowing, especially at the angularis, and the final firing was done a centimeter away from the angle of His to hopefully avoid incorporating esophageal fibers.  The subtotal gastrectomy specimen was retrieved through the 15 mm trocar site incision, inspected, and sent unopened to pathology for permanent section.  It was an average size specimen.  On inspecting the sleeve there was clearly some excess cardia.  The anesthesiology staff passed a 36 Cameroonian blunt-tipped bougie dilator and the excess cardia was excised over the bougie dilator and 5 mm away from the angle of His using an additional 60 mm purple load.  The small excess cardia was retrieved and sent with the original subtotal gastrectomy specimen.  The sleeve was reinspected and I was concerned the staple line may be too close to the esophagus.  In order to the expose the area I incised the tissue medial to the edge of the left lynne and mobilized the distal left esophagus and proximal sleeve staple line.  The staple line appeared to be away from the esophagus.  The corner of the staple line was imbricated with a running seromuscular absorbable 3-0 V-loc suture.  At this point I felt I had weaken the hiatus and was concerned that he would develop a hiatal hernia of more significance.  Therefore I extended up and across the phrenoesophageal membrane.  In order to obtain additional exposure and retraction under direct visualization an additional 5 mm trocar was placed in the right upper quadrant.  I divided the pars flaccida preserving the a panic branch of the vagus nerve and a replaced hepatic vessel.  I  incised the tissue medial to the right lynne and extended this up and across the phrenoesophageal membrane.  The GE junction was lengthened to well below the level of the crura by dissecting loose areolar tissue into the mediastinum.  A Penrose drain was used temporarily for esophageal retraction.  The anterior and posterior vagus nerves were preserved.  The crura were dissected to their meeting point inferiorly.  5 cc of FloSeal was placed into the hiatus.  The crura were closed posteriorly using a running nonabsorbable 2-0 V-loc suture with good result, photodocumentation obtained before and after repair.  The drain was removed.  The sleeve was submerged under saline.  Upper endoscopy was performed, and the endoscope was advanced into the duodenal bulb.  No air bubbles or leak seen, no bleeding at the staple line, no narrowing at the angularis, no pyloric spasm or deformity, no gastritis, no hiatal hernia or Morillo's esophagus, and the endoscope was withdrawn.    Irrigation fluid was suctioned free. Photodocumentation of the sleeve was obtained before and after endoscopy.  The Thea retractor was removed.  Fascia at the 15 mm trocar site incision was closed with a horizontal mattress 0 Vicryl suture placed with a suture passer under direct visualization and tying the knot extracorporeally.  Remaining trocars were removed under dir ect visualization, no bleeding noted from their sites.  Skin in each incision was closed using 3-0 Monocryl plus in an interrupted subcuticular stitch followed by skin-a-fix.  The patient tolerated the procedure well without complication, was taken to the recovery room in stable condition.

## 2020-12-11 NOTE — ANESTHESIA PREPROCEDURE EVALUATION
" Anesthesia Evaluation     Patient summary reviewed and Nursing notes reviewed                Airway   Mallampati: II  TM distance: >3 FB  Neck ROM: full  No difficulty expected  Dental - normal exam     Pulmonary - normal exam   (+) asthma,  Cardiovascular - negative cardio ROS and normal exam      ROS comment:   \"acceptable cardiac risk\" per cardiology    Neuro/Psych- negative ROS  GI/Hepatic/Renal/Endo    (+) morbid obesity,      Musculoskeletal (-) negative ROS    Abdominal  - normal exam    Bowel sounds: normal.   Substance History - negative use     OB/GYN negative ob/gyn ROS         Other                        Anesthesia Plan    ASA 3     general   (TAP blocks)  intravenous induction     Anesthetic plan, all risks, benefits, and alternatives have been provided, discussed and informed consent has been obtained with: patient.    Plan discussed with CRNA.      "

## 2020-12-11 NOTE — ANESTHESIA POSTPROCEDURE EVALUATION
Patient: Alen Rosales    Procedure Summary     Date: 12/11/20 Room / Location:  [facility]  [facility]    Anesthesia Start: 0728 Anesthesia Stop:     Procedures:       GASTRIC SLEEVE LAPAROSCOPIC (N/A Abdomen)      ESOPHAGOGASTRODUODENOSCOPY (N/A Esophagus)      HIATAL HERNIA REPAIR LAPAROSCOPIC (N/A Abdomen) Diagnosis:       Morbid obesity with body mass index (BMI) of 40.0 to 44.9 in adult (CMS/[facility])      Hiatal hernia      (Morbid obesity with body mass index (BMI) of 40.0 to 44.9 in adult (CMS/[facility]) [E66.01, Z68.41])    Surgeon: Vince Jean MD Provider: Lg Henry MD    Anesthesia Type: general ASA Status: 3          Anesthesia Type: general    Vitals  No vitals data found for the desired time range.          Post Anesthesia Care and Evaluation    Patient location during evaluation: PACU  Patient participation: complete - patient participated  Level of consciousness: awake and responsive to verbal stimuli  Pain score: 2  Pain management: adequate  Airway patency: patent  Anesthetic complications: No anesthetic complications    Cardiovascular status: acceptable  Respiratory status: acceptable  Hydration status: acceptable    Comments: Pt awake and responsive. SV. VSS. Report to RN. Patient Vitals in the past 24 hrs:  12/11/20 0709, BP:135/91, Temp:97.1 °F (36.2 °C), Temp src:Temporal, Pulse:68, Resp:18, SpO2:100 %  133/78. p 72. r 16. t 98.1

## 2020-12-11 NOTE — SIGNIFICANT NOTE
Called spouse Nahun by phone number [phone] and was sent to voice mail.  Unable to speak to his wife at this time.

## 2020-12-11 NOTE — SIGNIFICANT NOTE
Patient is sleeping currently.  Attempted to call report to April EPIFANIO.  She is unavailable and will return the call.

## 2020-12-11 NOTE — ANESTHESIA PROCEDURE NOTES
Peripheral Block      Patient reassessed immediately prior to procedure    Patient location during procedure: OR  Reason for block: at surgeon's request and post-op pain management  Performed by  Anesthesiologist: Lg Henry MD  Preanesthetic Checklist  Completed: patient identified, site marked, surgical consent, pre-op evaluation, timeout performed, IV checked, risks and benefits discussed and monitors and equipment checked  Prep:  Pt Position: supine  Sterile barriers:cap, gloves, sterile barriers and mask  Prep: ChloraPrep  Patient monitoring: blood pressure monitoring, continuous pulse oximetry and EKG  Procedure  Sedation:yes  Performed under: general  Guidance:ultrasound guided  Images:still images obtained, printed/placed on chart    Laterality:Bilateral  Block Type:TAP  Injection Technique:single-shot  Needle Type:short-bevel and echogenic  Needle Gauge:20 G  Resistance on Injection: none    Medications Used: buprenorphine (BUPRENEX) injection, 0.3 mg  dexamethasone sodium phosphate injection, 2 mg  bupivacaine PF (MARCAINE) 0.25 % injection, 60 mL      Medications  Comment:Block Injection:  LA dose divided between Right and Left block        Post Assessment  Injection Assessment: negative aspiration for heme, incremental injection and no paresthesia on injection  Patient Tolerance:comfortable throughout block  Complications:no  Additional Notes      Under Ultrasound guidance, a BBraun 4inch 360 degree needle was advanced with Normal Saline hydro dissection of tissue.  The Internal Oblique and Transversus Abdominus muscles where visualized.  At or before the aponeurosis of Internal Oblique, local anesthetic spread was visualized in the Transversus Abdominus Plane. Injection was made incrementally with aspiration every 5 mls.  There was no  intravascular injection,  injection pressure was normal, there was no neural injection, and the procedure was completed without difficulty.  Thank You.

## 2020-12-11 NOTE — ANESTHESIA PROCEDURE NOTES
Airway  Urgency: elective    Date/Time: 12/11/2020 7:35 AM  Airway not difficult    General Information and Staff    Patient location during procedure: OR  CRNA: Luca Mera CRNA    Indications and Patient Condition  Indications for airway management: airway protection    Preoxygenated: yes  MILS not maintained throughout  Mask difficulty assessment: 1 - vent by mask    Final Airway Details  Final airway type: endotracheal airway      Successful airway: ETT  Cuffed: yes   Successful intubation technique: direct laryngoscopy  Facilitating devices/methods: intubating stylet  Endotracheal tube insertion site: oral  Blade: Alexander  Blade size: 4  ETT size (mm): 7.5  Cormack-Lehane Classification: grade I - full view of glottis  Placement verified by: chest auscultation and capnometry   Measured from: lips  ETT/EBT  to lips (cm): 20  Number of attempts at approach: 1  Assessment: lips, teeth, and gum same as pre-op and atraumatic intubation    Additional Comments  Negative epigastric sounds, Breath sound equal bilaterally with symmetric chest rise and fall

## 2020-12-11 NOTE — BRIEF OP NOTE
GASTRIC SLEEVE LAPAROSCOPIC, ESOPHAGOGASTRODUODENOSCOPY, HIATAL HERNIA REPAIR LAPAROSCOPIC  Progress Note    Alen Rosales  12/11/2020    Pre-op Diagnosis:   Morbid obesity with body mass index (BMI) of 40.0 to 44.9 in adult (CMS/[facility]) [E66.01, Z68.41]       Post-Op Diagnosis Codes:     * Morbid obesity with body mass index (BMI) of 40.0 to 44.9 in adult (CMS/[facility]) [E66.01, Z68.41]     * Hiatal hernia [K44.9]    Procedure/CPT® Codes:  ND LAP, KATHIE RESTRICT PROC, LONGITUDINAL GASTRECTOMY [52393]  ND LAP,ESOPHAGOGAST FUNDOPLASTY [83122]  ND ESOPHAGOGASTRODUODENOSCOPY TRANSORAL DIAGNOSTIC [91934]      Procedure(s):  GASTRIC SLEEVE LAPAROSCOPIC  ESOPHAGOGASTRODUODENOSCOPY  HIATAL HERNIA REPAIR LAPAROSCOPIC    Surgeon(s):  Vince Jean MD    Anesthesia: General with Block    Staff:   Circulator: Annette Nguyễn RN  Scrub Person: Serenity Guerrero  Nursing Assistant: Refugio Olvera         Estimated Blood Loss: minimal    Urine Voided: * No values recorded between 12/11/2020  7:28 AM and 12/11/2020  9:11 AM *    Specimens:                Specimens     ID Source Type Tests Collected By Collected At Frozen?      A Stomach Tissue · TISSUE PATHOLOGY EXAM   Vince Jean MD 12/11/20 0803 No     Description: SUB-TOTAL GASTRECTOMY    This specimen was not marked as sent.                Drains: * No LDAs found *    Findings:     Complications: none          Vince Jean MD     Date: 12/11/2020  Time: 09:11 EST

## 2020-12-11 NOTE — PLAN OF CARE
Goal Outcome Evaluation:  Plan of Care Reviewed With: patient  Progress: improving  Outcome Summary: Admitted to floor. A/O. VSS. RA. Will ambulate. Will continue to montior.

## 2020-12-12 ENCOUNTER — APPOINTMENT (OUTPATIENT)
Dept: GENERAL RADIOLOGY | Facility: HOSPITAL | Age: 35
End: 2020-12-12

## 2020-12-12 ENCOUNTER — READMISSION MANAGEMENT (OUTPATIENT)
Dept: CALL CENTER | Facility: HOSPITAL | Age: 35
End: 2020-12-12

## 2020-12-12 VITALS
SYSTOLIC BLOOD PRESSURE: 127 MMHG | OXYGEN SATURATION: 98 % | TEMPERATURE: 97.9 F | HEART RATE: 70 BPM | WEIGHT: 295 LBS | HEIGHT: 70 IN | DIASTOLIC BLOOD PRESSURE: 70 MMHG | BODY MASS INDEX: 42.23 KG/M2 | RESPIRATION RATE: 16 BRPM

## 2020-12-12 LAB
ALBUMIN SERPL-MCNC: 3.6 G/DL (ref 3.5–5.2)
ALBUMIN/GLOB SERPL: 1.2 G/DL
ALP SERPL-CCNC: 50 U/L (ref 39–117)
ALT SERPL W P-5'-P-CCNC: 28 U/L (ref 1–41)
ANION GAP SERPL CALCULATED.3IONS-SCNC: 10 MMOL/L (ref 5–15)
AST SERPL-CCNC: 19 U/L (ref 1–40)
BASOPHILS # BLD AUTO: 0.01 10*3/MM3 (ref 0–0.2)
BASOPHILS NFR BLD AUTO: 0.1 % (ref 0–1.5)
BILIRUB SERPL-MCNC: 0.5 MG/DL (ref 0–1.2)
BUN SERPL-MCNC: 12 MG/DL (ref 6–20)
BUN/CREAT SERPL: 14.6 (ref 7–25)
CALCIUM SPEC-SCNC: 9.2 MG/DL (ref 8.6–10.5)
CHLORIDE SERPL-SCNC: 104 MMOL/L (ref 98–107)
CO2 SERPL-SCNC: 23 MMOL/L (ref 22–29)
CREAT SERPL-MCNC: 0.82 MG/DL (ref 0.76–1.27)
DEPRECATED RDW RBC AUTO: 43.8 FL (ref 37–54)
EOSINOPHIL # BLD AUTO: 0.01 10*3/MM3 (ref 0–0.4)
EOSINOPHIL NFR BLD AUTO: 0.1 % (ref 0.3–6.2)
ERYTHROCYTE [DISTWIDTH] IN BLOOD BY AUTOMATED COUNT: 13.7 % (ref 12.3–15.4)
GFR SERPL CREATININE-BSD FRML MDRD: 108 ML/MIN/1.73
GLOBULIN UR ELPH-MCNC: 3.1 GM/DL
GLUCOSE SERPL-MCNC: 100 MG/DL (ref 65–99)
HCT VFR BLD AUTO: 45.9 % (ref 37.5–51)
HGB BLD-MCNC: 13.7 G/DL (ref 13–17.7)
IMM GRANULOCYTES # BLD AUTO: 0.04 10*3/MM3 (ref 0–0.05)
IMM GRANULOCYTES NFR BLD AUTO: 0.3 % (ref 0–0.5)
IRON 24H UR-MRATE: 45 MCG/DL (ref 59–158)
LYMPHOCYTES # BLD AUTO: 1.9 10*3/MM3 (ref 0.7–3.1)
LYMPHOCYTES NFR BLD AUTO: 14.8 % (ref 19.6–45.3)
MCH RBC QN AUTO: 26.4 PG (ref 26.6–33)
MCHC RBC AUTO-ENTMCNC: 29.8 G/DL (ref 31.5–35.7)
MCV RBC AUTO: 88.4 FL (ref 79–97)
MONOCYTES # BLD AUTO: 1.3 10*3/MM3 (ref 0.1–0.9)
MONOCYTES NFR BLD AUTO: 10.1 % (ref 5–12)
NEUTROPHILS NFR BLD AUTO: 74.6 % (ref 42.7–76)
NEUTROPHILS NFR BLD AUTO: 9.62 10*3/MM3 (ref 1.7–7)
NRBC BLD AUTO-RTO: 0 /100 WBC (ref 0–0.2)
PLATELET # BLD AUTO: 233 10*3/MM3 (ref 140–450)
PMV BLD AUTO: 10.3 FL (ref 6–12)
POTASSIUM SERPL-SCNC: 4 MMOL/L (ref 3.5–5.2)
PROT SERPL-MCNC: 6.7 G/DL (ref 6–8.5)
RBC # BLD AUTO: 5.19 10*6/MM3 (ref 4.14–5.8)
SODIUM SERPL-SCNC: 137 MMOL/L (ref 136–145)
WBC # BLD AUTO: 12.88 10*3/MM3 (ref 3.4–10.8)

## 2020-12-12 PROCEDURE — 99024 POSTOP FOLLOW-UP VISIT: CPT | Performed by: SURGERY

## 2020-12-12 PROCEDURE — 74240 X-RAY XM UPR GI TRC 1CNTRST: CPT

## 2020-12-12 PROCEDURE — 83540 ASSAY OF IRON: CPT | Performed by: SURGERY

## 2020-12-12 PROCEDURE — 25010000002 CYANOCOBALAMIN PER 1000 MCG: Performed by: SURGERY

## 2020-12-12 PROCEDURE — 25010000002 ENOXAPARIN PER 10 MG: Performed by: SURGERY

## 2020-12-12 PROCEDURE — 25010000002 ONDANSETRON PER 1 MG: Performed by: SURGERY

## 2020-12-12 PROCEDURE — 80053 COMPREHEN METABOLIC PANEL: CPT | Performed by: SURGERY

## 2020-12-12 PROCEDURE — 85025 COMPLETE CBC W/AUTO DIFF WBC: CPT | Performed by: SURGERY

## 2020-12-12 PROCEDURE — 94799 UNLISTED PULMONARY SVC/PX: CPT

## 2020-12-12 PROCEDURE — 25010000003 POTASSIUM CHLORIDE PER 2 MEQ: Performed by: SURGERY

## 2020-12-12 PROCEDURE — 25010000002 NA FERRIC GLUC CPLX PER 12.5 MG: Performed by: SURGERY

## 2020-12-12 PROCEDURE — 0 DIATRIZOATE MEGLUMINE & SODIUM PER 1 ML: Performed by: SURGERY

## 2020-12-12 PROCEDURE — 25010000002 CEFAZOLIN PER 500 MG: Performed by: SURGERY

## 2020-12-12 RX ORDER — PROMETHAZINE HYDROCHLORIDE 12.5 MG/1
12.5 TABLET ORAL EVERY 4 HOURS PRN
Qty: 8 TABLET | Refills: 0 | Status: SHIPPED | OUTPATIENT
Start: 2020-12-12 | End: 2021-01-13

## 2020-12-12 RX ORDER — OXYCODONE HYDROCHLORIDE 5 MG/1
5 TABLET ORAL EVERY 4 HOURS PRN
Qty: 10 TABLET | Refills: 0 | Status: SHIPPED | OUTPATIENT
Start: 2020-12-12 | End: 2021-01-13

## 2020-12-12 RX ORDER — OMEPRAZOLE 40 MG/1
40 CAPSULE, DELAYED RELEASE ORAL DAILY
Qty: 60 CAPSULE | Refills: 0 | Status: SHIPPED | OUTPATIENT
Start: 2020-12-12 | End: 2021-01-04

## 2020-12-12 RX ORDER — ONDANSETRON 4 MG/1
4 TABLET, FILM COATED ORAL EVERY 4 HOURS PRN
Qty: 8 TABLET | Refills: 0 | Status: SHIPPED | OUTPATIENT
Start: 2020-12-12 | End: 2021-01-13

## 2020-12-12 RX ADMIN — CEFAZOLIN SODIUM 3 G: 10 INJECTION, POWDER, FOR SOLUTION INTRAVENOUS at 00:11

## 2020-12-12 RX ADMIN — SODIUM CHLORIDE 125 MG: 9 INJECTION, SOLUTION INTRAVENOUS at 14:27

## 2020-12-12 RX ADMIN — ENOXAPARIN SODIUM 40 MG: 40 INJECTION SUBCUTANEOUS at 10:11

## 2020-12-12 RX ADMIN — OXYCODONE HYDROCHLORIDE 5 MG: 5 TABLET ORAL at 10:23

## 2020-12-12 RX ADMIN — PANTOPRAZOLE SODIUM 40 MG: 40 INJECTION, POWDER, FOR SOLUTION INTRAVENOUS at 05:54

## 2020-12-12 RX ADMIN — Medication 30 ML: at 09:22

## 2020-12-12 RX ADMIN — ACETAMINOPHEN 1000 MG: 500 TABLET, FILM COATED ORAL at 14:31

## 2020-12-12 RX ADMIN — POTASSIUM CHLORIDE AND SODIUM CHLORIDE 125 ML/HR: 450; 150 INJECTION, SOLUTION INTRAVENOUS at 10:11

## 2020-12-12 RX ADMIN — GABAPENTIN 100 MG: 100 CAPSULE ORAL at 10:11

## 2020-12-12 RX ADMIN — ONDANSETRON 4 MG: 2 INJECTION INTRAMUSCULAR; INTRAVENOUS at 02:18

## 2020-12-12 RX ADMIN — HYDROMORPHONE HYDROCHLORIDE 2 MG: 2 TABLET ORAL at 00:14

## 2020-12-12 RX ADMIN — ACETAMINOPHEN 1000 MG: 500 TABLET, FILM COATED ORAL at 05:54

## 2020-12-12 RX ADMIN — CYANOCOBALAMIN 1000 MCG: 1000 INJECTION, SOLUTION INTRAMUSCULAR; SUBCUTANEOUS at 10:11

## 2020-12-12 NOTE — OUTREACH NOTE
Prep Survey      Responses   [facility] patient discharged from?  [facility]   Is LACE score < 7 ?  Yes   [facility]   Date of Admission  12/11/20   Date of Discharge  12/12/20   Discharge Disposition  Home or Self Care   Discharge diagnosis  Laparoscopic Sleeve Gastrectomy,     Laparoscopic hiatal hernia repair    Does the patient have one of the following disease processes/diagnoses(primary or secondary)?  General Surgery   Does the patient have Home health ordered?  No   Is there a DME ordered?  No   Prep survey completed?  Yes          Arlin Ryan RN

## 2020-12-12 NOTE — PLAN OF CARE
Goal Outcome Evaluation:  Plan of Care Reviewed With: patient  Progress: improving  Outcome Summary: Pt doing well this shift. No complaints of nausea and pain is controlled with PO PRN meds. Pt walking often. Room air, VSS. Will continue to monitor. 0215 12/12/2020

## 2020-12-12 NOTE — PLAN OF CARE
Goal Outcome Evaluation:  Plan of Care Reviewed With: patient  Progress: improving  Outcome Summary: patient alert and oriented, walking the hallss frequently, excelle nt use of incentive spirometry, tolerating protein well, pain well controlled wiht PO pain [id number] denies nausea at this time  Problem: Adult Inpatient Plan of Care  Goal: Plan of Care Review  Outcome: Ongoing, Progressing  Flowsheets (Taken 12/12/2020 1629)  Progress: improving  Plan of Care Reviewed With: patient  Outcome Summary: patient alert and oriented, walking the hallss frequently, excelle nt use of incentive spirometry, tolerating protein well, pain well controlled wiht PO pain [id number] denies nausea at this time  Goal: Patient-Specific Goal (Individualized)  Outcome: Ongoing, Progressing  Goal: Absence of Hospital-Acquired Illness or Injury  Outcome: Ongoing, Progressing  Intervention: Identify and Manage Fall Risk  Recent Flowsheet Documentation  Taken 12/12/2020 1600 by Jocelyn Martínez RN  Safety Promotion/Fall Prevention:   activity supervised   fall prevention program maintained   safety round/check completed  Taken 12/12/2020 1400 by Jocelyn Martínez RN  Safety Promotion/Fall Prevention:   activity supervised   fall prevention program maintained   nonskid shoes/slippers when out of bed   safety round/check completed  Taken 12/12/2020 1200 by Jocelyn Martínez RN  Safety Promotion/Fall Prevention:   fall prevention program maintained   safety round/check completed  Taken 12/12/2020 1000 by Jocelyn Martínez RN  Safety Promotion/Fall Prevention: safety round/check completed  Taken 12/12/2020 0800 by Jocelyn Martínez RN  Safety Promotion/Fall Prevention:   activity supervised   fall prevention program maintained   safety round/check completed  Intervention: Prevent Skin Injury  Recent Flowsheet Documentation  Taken 12/12/2020 1600 by Jocelyn Martínez RN  Body Position: supine, legs elevated  Taken 12/12/2020 1400 by  Jocelyn Martínez RN  Body Position: patient/family refused  Taken 12/12/2020 1200 by Jocelyn Martínez RN  Body Position: sitting up in bed  Taken 12/12/2020 1000 by Jocelyn Martínez RN  Body Position: side-lying, left  Taken 12/12/2020 0800 by Jocelyn Martínez RN  Body Position: supine, legs elevated  Intervention: Prevent and Manage VTE (venous thromboembolism) Risk  Recent Flowsheet Documentation  Taken 12/12/2020 0800 by Jocelyn Martínez RN  VTE Prevention/Management: bilateral  Goal: Optimal Comfort and Wellbeing  Outcome: Ongoing, Progressing  Intervention: Provide Person-Centered Care  Recent Flowsheet Documentation  Taken 12/12/2020 0800 by Jocelyn Martínez RN  Trust Relationship/Rapport:   care explained   empathic listening provided   emotional support provided   choices provided   questions answered   questions encouraged   reassurance provided   thoughts/feelings acknowledged  Goal: Readiness for Transition of Care  Outcome: Ongoing, Progressing     Problem: Bariatric Care Environmental Safety (Bariatric Surgery)  Goal: Safety Maintained with Care  Outcome: Ongoing, Progressing     Problem: Bleeding (Bariatric Surgery)  Goal: Absence of Bleeding  Outcome: Ongoing, Progressing  Intervention: Monitor and Manage Bleeding  Recent Flowsheet Documentation  Taken 12/12/2020 0800 by Jocelyn Martínez RN  Bleeding Management: dressing monitored     Problem: Bowel Motility Impaired (Bariatric Surgery)  Goal: Effective Bowel Motility and Elimination  Outcome: Ongoing, Progressing     Problem: Glycemic Control Impaired (Bariatric Surgery)  Goal: Blood Glucose Level Within Desired Range  Outcome: Ongoing, Progressing     Problem: Infection (Bariatric Surgery)  Goal: Absence of Infection Signs and Symptoms  Outcome: Ongoing, Progressing  Intervention: Prevent or Manage Infection  Recent Flowsheet Documentation  Taken 12/12/2020 0800 by Jocelyn Martínez RN  Fever Reduction/Comfort Measures:   lightweight  bedding   lightweight clothing     Problem: Ongoing Anesthesia Effects (Bariatric Surgery)  Goal: Anesthesia/Sedation Recovery  Outcome: Ongoing, Progressing  Intervention: Optimize Anesthesia Recovery  Recent Flowsheet Documentation  Taken 12/12/2020 1600 by Jocelyn Martínez RN  Safety Promotion/Fall Prevention:   activity supervised   fall prevention program maintained   safety round/check completed  Taken 12/12/2020 1424 by Jocelyn Martínez RN  Patient Tolerance (IS): good  Administration (IS): instruction provided, follow-up  Level Incentive Spirometer (mL): 3000  Incentive Spirometer Predicted Level (mL): 3000  Number of Repetitions (IS): 10  Taken 12/12/2020 1400 by Jocelyn Martínez RN  Safety Promotion/Fall Prevention:   activity supervised   fall prevention program maintained   nonskid shoes/slippers when out of bed   safety round/check completed  Taken 12/12/2020 1324 by Jocelyn Martínez RN  Patient Tolerance (IS): good  Administration (IS): instruction provided, follow-up  Level Incentive Spirometer (mL): 3000  Incentive Spirometer Predicted Level (mL): 3000  Number of Repetitions (IS): 10  Taken 12/12/2020 1224 by Jocelyn Martínez RN  Patient Tolerance (IS): good  Administration (IS): proper technique demonstrated  Level Incentive Spirometer (mL): 3000  Incentive Spirometer Predicted Level (mL): 3000  Number of Repetitions (IS): 10  Taken 12/12/2020 1200 by Jocelyn Martínez RN  Safety Promotion/Fall Prevention:   fall prevention program maintained   safety round/check completed  Taken 12/12/2020 1124 by Jocelyn Martínez RN  Patient Tolerance (IS): good  Administration (IS): self-administered  Level Incentive Spirometer (mL): 3000  Incentive Spirometer Predicted Level (mL): 3000  Number of Repetitions (IS): 10  Taken 12/12/2020 1024 by Jocelyn Martínez RN  Patient Tolerance (IS): good  Administration (IS): instruction provided, follow-up  Level Incentive Spirometer (mL): 3000  Incentive  Spirometer Predicted Level (mL): 3000  Number of Repetitions (IS): 10  Taken 12/12/2020 1000 by Jocelyn Martínez RN  Safety Promotion/Fall Prevention: safety round/check completed  Taken 12/12/2020 0924 by Jocelyn Martínez RN  Patient Tolerance (IS): good  Administration (IS): instruction provided, follow-up  Level Incentive Spirometer (mL): 3000  Incentive Spirometer Predicted Level (mL): 1500  Number of Repetitions (IS): 10  Taken 12/12/2020 0800 by Jocelyn Martínez RN  $ Incentive Spirometry: yes  Patient Tolerance (IS): good  Safety Promotion/Fall Prevention:   activity supervised   fall prevention program maintained   safety round/check completed  Administration (IS): instruction provided, follow-up  Level Incentive Spirometer (mL): 3000  Incentive Spirometer Predicted Level (mL): 1500  Number of Repetitions (IS): 10  Taken 12/12/2020 0724 by Jocelyn Martínez RN  Patient Tolerance (IS): good  Administration (IS): instruction provided, follow-up  Level Incentive Spirometer (mL): 3000  Incentive Spirometer Predicted Level (mL): 1500  Number of Repetitions (IS): 10     Problem: Pain (Bariatric Surgery)  Goal: Acceptable Pain Control  Outcome: Ongoing, Progressing  Intervention: Prevent or Manage Pain  Recent Flowsheet Documentation  Taken 12/12/2020 1600 by Jocelyn Martínez RN  Pain Management Interventions: quiet environment facilitated  Taken 12/12/2020 1400 by Jocelyn Martínez RN  Pain Management Interventions:   quiet environment facilitated   pillow support provided  Taken 12/12/2020 1200 by Jocelyn Martínez RN  Pain Management Interventions: quiet environment facilitated  Taken 12/12/2020 1000 by Jocelyn Martínez RN  Pain Management Interventions:   quiet environment facilitated   medication offered but refused  Taken 12/12/2020 0800 by Jocelyn Martínez RN  Pain Management Interventions: quiet environment facilitated  Diversional Activities:   smartphone   television     Problem:  Postoperative Nausea and Vomiting (Bariatric Surgery)  Goal: Nausea and Vomiting Relief  Outcome: Ongoing, Progressing  Intervention: Prevent or Manage Postoperative Nausea and Vomiting  Recent Flowsheet Documentation  Taken 12/12/2020 0800 by Jocelyn Martínez RN  Nausea/Vomiting Interventions: stimuli minimized     Problem: Postoperative Urinary Retention (Bariatric Surgery)  Goal: Effective Urinary Elimination  Outcome: Ongoing, Progressing

## 2020-12-12 NOTE — PROGRESS NOTES
"Cc: POD#1 LSG/HHR  \"feel fine\"    His wife is in the room.  The patient looks and feels fine and wants to go home.  Tolerating his protein shakes with minimal nausea.  Ambulating and voiding well.  No pulmonary complaints.  Spirometer greater than 2500.  Small flatus no bowel movement.  No fever or tachycardia pulse 70 respiration 16 blood pressure 127/70  UO 1700 - 800 he is in no apparent distress.  Lungs clear to auscultation.  Heart regular rhythm.  Abdomen soft, nontender, nondistended, bowel sounds hypoactive.  Wounds look okay CMP normal except for glucose 100.  Iron low at 45.  White blood count 12.9 with 75 segs 15 lymphs 10 monocytes no bands.  H&H 13.7 and 45.9.  Hemoglobin A1c normal at 5.60.  Upper GI images reviewed and unremarkable, report unremarkable.    Impression: Doing okay postop day #1 sleeve gastrectomy and hiatal hernia repair.  Iron deficiency without evidence of bleeding on Lovenox.  Mild elevation of glucose with normal hemoglobin A1c.    Plan: Discharge home.  Discharge instructions discussed.  See orders      "

## 2020-12-14 ENCOUNTER — TRANSITIONAL CARE MANAGEMENT TELEPHONE ENCOUNTER (OUTPATIENT)
Dept: CALL CENTER | Facility: HOSPITAL | Age: 35
End: 2020-12-14

## 2020-12-14 LAB
CYTO UR: NORMAL
LAB AP CASE REPORT: NORMAL
LAB AP CLINICAL INFORMATION: NORMAL
PATH REPORT.FINAL DX SPEC: NORMAL
PATH REPORT.GROSS SPEC: NORMAL

## 2020-12-14 NOTE — OUTREACH NOTE
Call Center TCM Note      Responses   [facility] patient discharged from?  [place]   Does the patient have one of the following disease processes/diagnoses(primary or secondary)?  General Surgery   TCM attempt successful?  Yes   Call start time  1249   Call end time  1254   Discharge diagnosis  Laparoscopic Sleeve Gastrectomy,     Laparoscopic hiatal hernia repair    Is patient permission given to speak with other caregiver?  Yes   Person spoke with today (if not patient) and relationship  Nahun/wife   Meds reviewed with patient/caregiver?  Yes   Is the patient having any side effects they believe may be caused by any medication additions or changes?  No   Does the patient have all medications related to this admission filled (includes all antibiotics, pain medications, etc.)  Yes   Is the patient taking all medications as directed (includes completed medication regime)?  Yes   Does the patient have a follow up appointment scheduled with their surgeon?  Yes   Has the patient kept scheduled appointments due by today?  N/A   Comments  Has a followup with surgeon on 12/17/2020 but declines a followup with PCP at this time.    Has home health visited the patient within 72 hours of discharge?  N/A   Psychosocial issues?  No   Comments  Patient is doing well. No nausea, questions or concerns.    Did the patient receive a copy of their discharge instructions?  Yes   Nursing interventions  Reviewed instructions with patient   What is the patient's perception of their health status since discharge?  Improving   Nursing interventions  Nurse provided patient education   Is the patient /caregiver able to teach back basic post-op care?  No tub bath, swimming, or hot tub until instructed by MD, Take showers only when approved by MD-sponge bathe until then, Drive as instructed by MD in discharge instructions, Practice 'cough and deep breath', Keep incision areas clean,dry and protected, Lifting as instructed by MD in  discharge instructions, Do not remove steri-strips   Is the patient/caregiver able to teach back signs and symptoms of incisional infection?  Increased redness, swelling or pain at the incisonal site, Increased drainage or bleeding, Pus or odor from incision, Incisional warmth, Fever   Is the patient/caregiver able to teach back steps to recovery at home?  Set small, achievable goals for return to baseline health, Rest and rebuild strength, gradually increase activity, Make a list of questions for surgeon's appointment   If the patient is a current smoker, are they able to teach back resources for cessation?  Not a smoker   Is the patient/caregiver able to teach back the hierarchy of who to call/visit for symptoms/problems? PCP, Specialist, Home health nurse, Urgent Care, ED, 911  Yes   TCM call completed?  Yes          Rehan Jiménez RN    12/14/2020, 12:54 EST

## 2020-12-17 ENCOUNTER — OFFICE VISIT (OUTPATIENT)
Dept: BARIATRICS/WEIGHT MGMT | Facility: CLINIC | Age: 35
End: 2020-12-17

## 2020-12-17 VITALS
SYSTOLIC BLOOD PRESSURE: 124 MMHG | HEART RATE: 72 BPM | OXYGEN SATURATION: 99 % | WEIGHT: 276.5 LBS | HEIGHT: 70 IN | BODY MASS INDEX: 39.58 KG/M2 | RESPIRATION RATE: 18 BRPM | TEMPERATURE: 97.2 F | DIASTOLIC BLOOD PRESSURE: 68 MMHG

## 2020-12-17 DIAGNOSIS — Z98.84 STATUS POST BARIATRIC SURGERY: Primary | ICD-10-CM

## 2020-12-17 DIAGNOSIS — E66.9 OBESITY, CLASS II, BMI 35-39.9: ICD-10-CM

## 2020-12-17 PROCEDURE — 99024 POSTOP FOLLOW-UP VISIT: CPT | Performed by: PHYSICIAN ASSISTANT

## 2020-12-17 RX ORDER — URSODIOL 300 MG/1
300 CAPSULE ORAL 2 TIMES DAILY
Qty: 60 CAPSULE | Refills: 5 | Status: SHIPPED | OUTPATIENT
Start: 2020-12-17 | End: 2021-01-16

## 2020-12-17 NOTE — PROGRESS NOTES
[facility]  [address]  [address]  [facility] [address]  [phone]      Patient Name:  Alen Rosales.  :  1985      Reason for Visit:  POD #6    HPI:  Alen Rosales is a 35 y.o. male s/p LSG/ HHR by Dr. Jean on 20    D/c home POD#1    Doing well.  No issues/concerns. Hasn't required pain meds or antiemetics. Denies dysphagia, reflux, nausea, vomiting, abdominal pain, pulmonary issues and fevers.  Tolerating diet progression - on stage 1.  Getting 85-100g prot/day.  Drinking close to 64 fluid oz/day.  Hasn't started vitamins, wife gets patches from [employer].  On Omeprazole .  Holding ASA , NSAIDs , Tramadol, Hormones, Diuretics , Steroids and Immunologics.  Ambulating.     Presurgery weight: 295 pounds.  Today's weight is 125 kg (276 lb 8 oz) pounds, today's  Body mass index is 39.67 kg/m²., and@ weight loss since surgery is 19 pounds.       Past Medical History:   Diagnosis Date   • Asthma     AS CHILD   • Dyspepsia    • Dyspnea on exertion    • Elevated LDL cholesterol level    • Fatigue    • Morbid obesity (CMS/HCC)      Past Surgical History:   Procedure Laterality Date   • DENTAL PROCEDURE      tooth pulled    • ENDOSCOPY N/A 2020    Procedure: ESOPHAGOGASTRODUODENOSCOPY;  Surgeon: Vince Jean MD;  [facility];  Service: Bariatric;  Laterality: N/A;   • GASTRIC SLEEVE LAPAROSCOPIC N/A 2020    Procedure: GASTRIC SLEEVE LAPAROSCOPIC;  Surgeon: Vince Jean MD;  [facility];  Service: Bariatric;  Laterality: N/A;   • HIATAL HERNIA REPAIR N/A 2020    Procedure: HIATAL HERNIA REPAIR LAPAROSCOPIC;  Surgeon: Vince Jean MD;  [facility];  Service: Bariatric;  Laterality: N/A;   • NO PAST SURGERIES       Outpatient Medications Marked as Taking for the 20 encounter (Office Visit) with Heather Whitlock PA-C   Medication Sig Dispense Refill   • omeprazole (PrilOSEC) 40 MG  "capsule Take 1 capsule by mouth Daily for 60 days. 60 capsule 0     No Known Allergies    Social History     Socioeconomic History   • Marital status:      Spouse name: Not on file   • Number of children: Not on file   • Years of education: Not on file   • Highest education level: Not on file   Tobacco Use   • Smoking status: Never Smoker   • Smokeless tobacco: Never Used   Substance and Sexual Activity   • Alcohol use: Not Currently     Alcohol/week: 1.0 standard drinks     Types: 1 Shots of liquor per week     Comment: socially   • Drug use: Never   • Sexual activity: Yes     Partners: Female   Social History Narrative    Lives in [place], [place] with wife Nahun Hartman.  Works full time at [employer] as a /  for 15yrs.        /68 (BP Location: Left arm, Patient Position: Sitting, Cuff Size: Large Adult)   Pulse 72   Temp 97.2 °F (36.2 °C) (Temporal)   Resp 18   Ht 177.8 cm (70\")   Wt 125 kg (276 lb 8 oz)   SpO2 99%   BMI 39.67 kg/m²   Physical Exam  Constitutional:       Appearance: He is well-developed.   HENT:      Head: Normocephalic and atraumatic.      Comments: mask  Cardiovascular:      Rate and Rhythm: Normal rate and regular rhythm.   Pulmonary:      Effort: Pulmonary effort is normal.      Breath sounds: Normal breath sounds.   Abdominal:      General: Bowel sounds are normal.      Palpations: Abdomen is soft.      Comments: Incisions healing well  Large tattoo on abd   Skin:     General: Skin is warm and dry.   Neurological:      Mental Status: He is alert.   Psychiatric:         Mood and Affect: Mood normal.         Behavior: Behavior normal.         Thought Content: Thought content normal.         Judgment: Judgment normal.           Assessment:   POD #6 s/p LSG/ HHR by Dr. Jean on 12/11/20    ICD-10-CM ICD-9-CM   1. Status post bariatric surgery  Z98.84 V45.86   2. Obesity, Class II, BMI 35-39.9  E66.9 278.00         Plan:  Doing well. Continue " to advance diet per manual.  Increase protein intake to 100g/day.  Increase exercise/activity as tolerated.  Reviewed lifting restrictions, nothing >25 lbs x 2 more weeks.  Continue vitamins.  Continue PPI.  Continue to avoid ASA/NSAIDs/tramadol/tobacco x 6 weeks postop, steroids x 8 weeks postop.  Call w/ problems/concerns.    Patient's Body mass index is 39.67 kg/m². BMI is above normal parameters. Recommendations include: exercise counseling and nutrition counseling.        The patient was instructed to follow up in 3 weeks, sooner if needed.

## 2021-01-04 RX ORDER — OMEPRAZOLE 40 MG/1
CAPSULE, DELAYED RELEASE ORAL
Qty: 30 CAPSULE | Refills: 1 | Status: SHIPPED | OUTPATIENT
Start: 2021-01-04 | End: 2021-02-01

## 2021-01-13 ENCOUNTER — LAB (OUTPATIENT)
Dept: LAB | Facility: HOSPITAL | Age: 36
End: 2021-01-13

## 2021-01-13 ENCOUNTER — OFFICE VISIT (OUTPATIENT)
Dept: BARIATRICS/WEIGHT MGMT | Facility: CLINIC | Age: 36
End: 2021-01-13

## 2021-01-13 VITALS
BODY MASS INDEX: 37.72 KG/M2 | SYSTOLIC BLOOD PRESSURE: 120 MMHG | RESPIRATION RATE: 18 BRPM | HEIGHT: 70 IN | TEMPERATURE: 97.4 F | HEART RATE: 89 BPM | DIASTOLIC BLOOD PRESSURE: 88 MMHG | WEIGHT: 263.5 LBS | OXYGEN SATURATION: 99 %

## 2021-01-13 DIAGNOSIS — E55.9 HYPOVITAMINOSIS D: ICD-10-CM

## 2021-01-13 DIAGNOSIS — Z90.3 POSTGASTRECTOMY MALABSORPTION: ICD-10-CM

## 2021-01-13 DIAGNOSIS — K91.2 POSTGASTRECTOMY MALABSORPTION: ICD-10-CM

## 2021-01-13 DIAGNOSIS — Z13.21 MALNUTRITION SCREEN: ICD-10-CM

## 2021-01-13 DIAGNOSIS — Z13.0 SCREENING, IRON DEFICIENCY ANEMIA: ICD-10-CM

## 2021-01-13 DIAGNOSIS — E66.9 OBESITY, CLASS II, BMI 35-39.9: ICD-10-CM

## 2021-01-13 DIAGNOSIS — R53.83 FATIGUE, UNSPECIFIED TYPE: ICD-10-CM

## 2021-01-13 DIAGNOSIS — Z98.84 STATUS POST BARIATRIC SURGERY: Primary | ICD-10-CM

## 2021-01-13 LAB
ALBUMIN SERPL-MCNC: 4.6 G/DL (ref 3.5–5.2)
ALBUMIN/GLOB SERPL: 2 G/DL
ALP SERPL-CCNC: 65 U/L (ref 39–117)
ALT SERPL W P-5'-P-CCNC: 14 U/L (ref 1–41)
ANION GAP SERPL CALCULATED.3IONS-SCNC: 8.9 MMOL/L (ref 5–15)
AST SERPL-CCNC: 15 U/L (ref 1–40)
BASOPHILS # BLD AUTO: 0.05 10*3/MM3 (ref 0–0.2)
BASOPHILS NFR BLD AUTO: 1.1 % (ref 0–1.5)
BILIRUB SERPL-MCNC: 0.6 MG/DL (ref 0–1.2)
BUN SERPL-MCNC: 18 MG/DL (ref 6–20)
BUN/CREAT SERPL: 22 (ref 7–25)
CALCIUM SPEC-SCNC: 9.4 MG/DL (ref 8.6–10.5)
CHLORIDE SERPL-SCNC: 102 MMOL/L (ref 98–107)
CO2 SERPL-SCNC: 28.1 MMOL/L (ref 22–29)
CREAT SERPL-MCNC: 0.82 MG/DL (ref 0.76–1.27)
DEPRECATED RDW RBC AUTO: 42.6 FL (ref 37–54)
EOSINOPHIL # BLD AUTO: 0.12 10*3/MM3 (ref 0–0.4)
EOSINOPHIL NFR BLD AUTO: 2.6 % (ref 0.3–6.2)
ERYTHROCYTE [DISTWIDTH] IN BLOOD BY AUTOMATED COUNT: 14.3 % (ref 12.3–15.4)
FERRITIN SERPL-MCNC: 325 NG/ML (ref 30–400)
GFR SERPL CREATININE-BSD FRML MDRD: 108 ML/MIN/1.73
GLOBULIN UR ELPH-MCNC: 2.3 GM/DL
GLUCOSE SERPL-MCNC: 88 MG/DL (ref 65–99)
HCT VFR BLD AUTO: 45.2 % (ref 37.5–51)
HGB BLD-MCNC: 15 G/DL (ref 13–17.7)
IMM GRANULOCYTES # BLD AUTO: 0 10*3/MM3 (ref 0–0.05)
IMM GRANULOCYTES NFR BLD AUTO: 0 % (ref 0–0.5)
IRON 24H UR-MRATE: 77 MCG/DL (ref 59–158)
LYMPHOCYTES # BLD AUTO: 1.56 10*3/MM3 (ref 0.7–3.1)
LYMPHOCYTES NFR BLD AUTO: 33.7 % (ref 19.6–45.3)
MCH RBC QN AUTO: 27.7 PG (ref 26.6–33)
MCHC RBC AUTO-ENTMCNC: 33.2 G/DL (ref 31.5–35.7)
MCV RBC AUTO: 83.4 FL (ref 79–97)
MONOCYTES # BLD AUTO: 0.42 10*3/MM3 (ref 0.1–0.9)
MONOCYTES NFR BLD AUTO: 9.1 % (ref 5–12)
NEUTROPHILS NFR BLD AUTO: 2.48 10*3/MM3 (ref 1.7–7)
NEUTROPHILS NFR BLD AUTO: 53.5 % (ref 42.7–76)
NRBC BLD AUTO-RTO: 0.2 /100 WBC (ref 0–0.2)
PLATELET # BLD AUTO: 205 10*3/MM3 (ref 140–450)
PMV BLD AUTO: 11.2 FL (ref 6–12)
POTASSIUM SERPL-SCNC: 3.8 MMOL/L (ref 3.5–5.2)
PROT SERPL-MCNC: 6.9 G/DL (ref 6–8.5)
RBC # BLD AUTO: 5.42 10*6/MM3 (ref 4.14–5.8)
SODIUM SERPL-SCNC: 139 MMOL/L (ref 136–145)
WBC # BLD AUTO: 4.63 10*3/MM3 (ref 3.4–10.8)

## 2021-01-13 PROCEDURE — 82306 VITAMIN D 25 HYDROXY: CPT

## 2021-01-13 PROCEDURE — 85025 COMPLETE CBC W/AUTO DIFF WBC: CPT

## 2021-01-13 PROCEDURE — 84425 ASSAY OF VITAMIN B-1: CPT

## 2021-01-13 PROCEDURE — 82728 ASSAY OF FERRITIN: CPT

## 2021-01-13 PROCEDURE — 99024 POSTOP FOLLOW-UP VISIT: CPT | Performed by: PHYSICIAN ASSISTANT

## 2021-01-13 PROCEDURE — 83921 ORGANIC ACID SINGLE QUANT: CPT

## 2021-01-13 PROCEDURE — 84134 ASSAY OF PREALBUMIN: CPT

## 2021-01-13 PROCEDURE — 83540 ASSAY OF IRON: CPT

## 2021-01-13 PROCEDURE — 82746 ASSAY OF FOLIC ACID SERUM: CPT

## 2021-01-13 PROCEDURE — 80053 COMPREHEN METABOLIC PANEL: CPT

## 2021-01-13 PROCEDURE — 36415 COLL VENOUS BLD VENIPUNCTURE: CPT

## 2021-01-13 NOTE — PROGRESS NOTES
[facility] Bariatric Surgery  [address]  [address]  [facility] [address]  [phone]      Patient Name:  Alen Rosales.  :  1985      Reason for Visit:  1 month postop    HPI:  Alen Rosales is a 35 y.o. male s/p LSG/ HHR by Dr. Jean on 20    Doing well.   Rare nausea if eating too fast. No other issues/concerns. Denies dysphagia, reflux, vomiting, abdominal pain, pulmonary issues and fevers.  Tolerating diet progression. Getting 85-100g prot/day. Protein shake in morning, baby bell for snack, greek yogurt for lunch, baby bell for afternoon snack, [id number] water.  Dinner after work.  Drinking 64+ fluid oz/day.  Taking Patches: patchaid MVI (on 4 patch bundle).  On Omeprazole  and Actigall .  Still holding ASA , NSAIDs , Tramadol, Hormones, Diuretics , Steroids and Immunologics.  Exercising- walking some,  Back to work.     Presurgery weight:  295 pounds. Today's weight is 120 kg (263 lb 8 oz) pounds, today's Body mass index is 37.81 kg/m²., and@ weight loss since surgery is 32 pounds.       Past Medical History:   Diagnosis Date   • Asthma     AS CHILD   • Dyspepsia    • Dyspnea on exertion    • Elevated LDL cholesterol level    • Fatigue    • Morbid obesity (CMS/HCC)      Past Surgical History:   Procedure Laterality Date   • DENTAL PROCEDURE      tooth pulled    • ENDOSCOPY N/A 2020    Procedure: ESOPHAGOGASTRODUODENOSCOPY;  Surgeon: Vince Jean MD;  [facility];  Service: Bariatric;  Laterality: N/A;   • GASTRIC SLEEVE LAPAROSCOPIC N/A 2020    Procedure: GASTRIC SLEEVE LAPAROSCOPIC;  Surgeon: Vince Jean MD;  [facility];  Service: Bariatric;  Laterality: N/A;   • HIATAL HERNIA REPAIR N/A 2020    Procedure: HIATAL HERNIA REPAIR LAPAROSCOPIC;  Surgeon: Vince Jean MD;  [facility];  Service: Bariatric;  Laterality: N/A;   • NO PAST SURGERIES       Outpatient Medications Marked as Taking for  "the 1/13/21 encounter (Office Visit) with Heather Whitlock PA-C   Medication Sig Dispense Refill   • omeprazole (priLOSEC) 40 MG capsule TAKE 1 CAPSULE BY MOUTH EVERY DAY 30 capsule 1   • ursodiol (ACTIGALL) 300 MG capsule Take 1 capsule by mouth 2 (Two) Times a Day for 30 days. 60 capsule 5   • [DISCONTINUED] ondansetron (Zofran) 4 MG tablet Take 1 tablet by mouth Every 4 (Four) Hours As Needed for Nausea. 8 tablet 0     No Known Allergies    Social History     Socioeconomic History   • Marital status:      Spouse name: Not on file   • Number of children: Not on file   • Years of education: Not on file   • Highest education level: Not on file   Tobacco Use   • Smoking status: Never Smoker   • Smokeless tobacco: Never Used   Substance and Sexual Activity   • Alcohol use: Not Currently     Alcohol/week: 1.0 standard drinks     Types: 1 Shots of liquor per week     Comment: socially   • Drug use: Never   • Sexual activity: Yes     Partners: Female   Social History Narrative    Lives in [place], [place] with wife Nahun Hartman.  Works full time at [employer] as a /  for 15yrs.        /88 (BP Location: Left arm, Patient Position: Sitting, Cuff Size: Large Adult)   Pulse 89   Temp 97.4 °F (36.3 °C) (Temporal)   Resp 18   Ht 177.8 cm (70\")   Wt 120 kg (263 lb 8 oz)   SpO2 99%   BMI 37.81 kg/m²     Physical Exam  Constitutional:       Appearance: He is well-developed.   HENT:      Head: Normocephalic and atraumatic.   Cardiovascular:      Rate and Rhythm: Normal rate and regular rhythm.   Pulmonary:      Effort: Pulmonary effort is normal.      Breath sounds: Normal breath sounds.   Abdominal:      General: Bowel sounds are normal.      Palpations: Abdomen is soft.      Comments: Incisions well healed   Skin:     General: Skin is warm and dry.   Neurological:      Mental Status: He is alert.   Psychiatric:         Behavior: Behavior normal.           Assessment:  1 " month s/p LSG/ HHR by Dr. Jean on 12/11/20    ICD-10-CM ICD-9-CM   1. Status post bariatric surgery  Z98.84 V45.86   2. Obesity, Class II, BMI 35-39.9  E66.9 278.00   3. Fatigue, unspecified type  R53.83 780.79   4. Hypovitaminosis D  E55.9 268.9   5. Screening, iron deficiency anemia  Z13.0 V78.0   6. Malnutrition screen  Z13.21 V77.2   7. Postgastrectomy malabsorption  K91.2 579.3    Z90.3          Plan:  Doing well.  Continue to advance diet per manual.  Continue protein 70-100g/day.  Encouraged good food choices - high protein, low carb.  Continue fluids 64oz per day. Continue routine exercise, lifting restrictions lifted.  Continue PPI and actigall. Continue to avoid NSAIDS, ASA, tramadol, tobacco x 6 weeks postop, steroids x 8 weeks postop.  Routine bariatric labs ordered.  Continue vitamins w/ adjustments pending lab results.  Call w/ problems/concerns.    Patient's Body mass index is 37.81 kg/m². BMI is above normal parameters. Recommendations include: exercise counseling and nutrition counseling.        The patient was instructed to follow up in 2 months, sooner if needed.

## 2021-01-14 LAB
25(OH)D3 SERPL-MCNC: 28 NG/ML (ref 30–100)
FOLATE SERPL-MCNC: 10.1 NG/ML (ref 4.78–24.2)
PREALB SERPL-MCNC: 19.9 MG/DL (ref 20–40)

## 2021-01-16 LAB — VIT B1 BLD-SCNC: 101.4 NMOL/L (ref 66.5–200)

## 2021-01-19 LAB
Lab: NORMAL
METHYLMALONATE SERPL-SCNC: 152 NMOL/L (ref 0–378)

## 2021-02-01 RX ORDER — OMEPRAZOLE 40 MG/1
CAPSULE, DELAYED RELEASE ORAL
Qty: 30 CAPSULE | Refills: 1 | Status: SHIPPED | OUTPATIENT
Start: 2021-02-01 | End: 2021-03-15

## 2021-02-12 PROCEDURE — 99283 EMERGENCY DEPT VISIT LOW MDM: CPT

## 2021-02-13 ENCOUNTER — APPOINTMENT (OUTPATIENT)
Dept: GENERAL RADIOLOGY | Facility: HOSPITAL | Age: 36
End: 2021-02-13

## 2021-02-13 ENCOUNTER — HOSPITAL ENCOUNTER (EMERGENCY)
Facility: HOSPITAL | Age: 36
Discharge: HOME | End: 2021-02-13
Attending: EMERGENCY MEDICINE | Admitting: EMERGENCY MEDICINE

## 2021-02-13 VITALS
RESPIRATION RATE: 18 BRPM | WEIGHT: 255 LBS | TEMPERATURE: 98.3 F | OXYGEN SATURATION: 100 % | BODY MASS INDEX: 36.51 KG/M2 | DIASTOLIC BLOOD PRESSURE: 94 MMHG | SYSTOLIC BLOOD PRESSURE: 141 MMHG | HEIGHT: 70 IN | HEART RATE: 76 BPM

## 2021-02-13 DIAGNOSIS — S83.91XA SPRAIN OF RIGHT KNEE, UNSPECIFIED LIGAMENT, INITIAL ENCOUNTER: Primary | ICD-10-CM

## 2021-02-13 PROCEDURE — 73562 X-RAY EXAM OF KNEE 3: CPT

## 2021-02-13 RX ORDER — HYDROCODONE BITARTRATE AND ACETAMINOPHEN 5; 325 MG/1; MG/1
1 TABLET ORAL ONCE
Status: COMPLETED | OUTPATIENT
Start: 2021-02-13 | End: 2021-02-13

## 2021-02-13 RX ORDER — INDOMETHACIN 50 MG/1
50 CAPSULE ORAL
Qty: 15 CAPSULE | Refills: 0 | Status: SHIPPED | OUTPATIENT
Start: 2021-02-13 | End: 2021-03-03

## 2021-02-13 RX ADMIN — HYDROCODONE BITARTRATE AND ACETAMINOPHEN 1 TABLET: 5; 325 TABLET ORAL at 00:46

## 2021-02-13 NOTE — ED PROVIDER NOTES
"Subjective   35 y/o male that presents to emergency department with c/c \"Right knee pain\" x one hour. Patient states that he went to step off couch when felt a snap in his right knee. States that he immediately felt pain and has been unable to straighten knee. Patient denies any recent or previous injury to right knee.       History provided by:  Patient   used: No    Knee Pain  Location:  Knee  Time since incident:  2 days  Injury: no    Knee location:  R knee  Pain details:     Quality:  Aching    Radiates to:  Does not radiate    Severity:  Moderate    Onset quality:  Gradual    Duration:  2 days    Timing:  Intermittent    Progression:  Worsening  Chronicity:  New  Dislocation: no    Foreign body present:  No foreign bodies  Tetanus status:  Unknown  Prior injury to area:  No  Relieved by:  Nothing  Worsened by:  Nothing  Ineffective treatments:  None tried  Associated symptoms: decreased ROM and swelling    Risk factors: no concern for non-accidental trauma, no frequent fractures, no known bone disorder, no obesity and no recent illness        Review of Systems   Eyes: Negative.    Respiratory: Negative for apnea, cough and chest tightness.    Cardiovascular: Negative for chest pain, palpitations and leg swelling.   Gastrointestinal: Negative.  Negative for abdominal pain, anal bleeding, blood in stool and constipation.   Endocrine: Negative.  Negative for cold intolerance, heat intolerance, polydipsia and polyphagia.   Genitourinary: Negative.  Negative for difficulty urinating, dysuria, enuresis and flank pain.   Musculoskeletal: Positive for arthralgias, joint swelling and myalgias.   Skin: Negative.  Negative for pallor and rash.   All other systems reviewed and are negative.      Past Medical History:   Diagnosis Date   • Asthma     AS CHILD   • Dyspepsia    • Dyspnea on exertion    • Elevated LDL cholesterol level    • Fatigue    • Morbid obesity (CMS/HCC)        No Known " Allergies    Past Surgical History:   Procedure Laterality Date   • DENTAL PROCEDURE      tooth pulled    • ENDOSCOPY N/A 12/11/2020    Procedure: ESOPHAGOGASTRODUODENOSCOPY;  Surgeon: Vince Jean MD;  [facility];  Service: Bariatric;  Laterality: N/A;   • GASTRIC SLEEVE LAPAROSCOPIC N/A 12/11/2020    Procedure: GASTRIC SLEEVE LAPAROSCOPIC;  Surgeon: Vince Jean MD;  Location:  [facility];  Service: Bariatric;  Laterality: N/A;   • HIATAL HERNIA REPAIR N/A 12/11/2020    Procedure: HIATAL HERNIA REPAIR LAPAROSCOPIC;  Surgeon: Vince Jean MD;  Location:  [facility];  Service: Bariatric;  Laterality: N/A;   • NO PAST SURGERIES         Family History   Problem Relation Age of Onset   • Hypertension Mother    • Diabetes Mother    • Obesity Mother    • Sleep apnea Mother    • Coronary artery disease Father    • Hyperlipidemia Father    • Hypertension Father    • Heart attack Father    • Heart disease Father    • Drug abuse Brother    • Hypertension Brother    • COPD Maternal Grandmother    • Obesity Maternal Grandmother    • Diabetes Maternal Grandmother    • Hypertension Maternal Grandmother    • Heart disease Maternal Grandmother    • Heart attack Maternal Grandmother    • Kidney cancer Maternal Grandmother    • Cancer Maternal Grandmother         Bladder Cancer   • Hypertension Maternal Grandfather    • Stroke Maternal Grandfather    • Stroke Paternal Grandmother    • Hypertension Paternal Grandmother    • Stroke Paternal Grandfather    • Hypertension Paternal Grandfather    • Diabetes Paternal Grandfather        Social History     Socioeconomic History   • Marital status:      Spouse name: Not on file   • Number of children: Not on file   • Years of education: Not on file   • Highest education level: Not on file   Tobacco Use   • Smoking status: Never Smoker   • Smokeless tobacco: Never Used   Substance and Sexual Activity   • Alcohol use: Not Currently     Alcohol/week: 1.0  standard drinks     Types: 1 Shots of liquor per week     Comment: socially   • Drug use: Never   • Sexual activity: Yes     Partners: Female   Social History Narrative    Lives in [place], [place] with wife Nahun Hartman.  Works full time at [employer] as a /  for 15yrs.            Objective   Physical Exam  Vitals signs and nursing note reviewed.   Constitutional:       General: He is not in acute distress.     Appearance: Normal appearance. He is normal weight. He is not ill-appearing or toxic-appearing.   HENT:      Head: Normocephalic and atraumatic.      Right Ear: Tympanic membrane, ear canal and external ear normal. There is no impacted cerumen.      Left Ear: Tympanic membrane, ear canal and external ear normal. There is no impacted cerumen.      Nose: Nose normal. No congestion.      Mouth/Throat:      Mouth: Mucous membranes are moist.      Pharynx: Oropharynx is clear. No oropharyngeal exudate.   Eyes:      General: No scleral icterus.        Right eye: No discharge.         Left eye: No discharge.      Extraocular Movements: Extraocular movements intact.      Conjunctiva/sclera: Conjunctivae normal.      Pupils: Pupils are equal, round, and reactive to light.   Neck:      Musculoskeletal: Normal range of motion. No neck rigidity or muscular tenderness.   Cardiovascular:      Rate and Rhythm: Normal rate and regular rhythm.      Pulses: Normal pulses.      Heart sounds: Normal heart sounds. No murmur. No friction rub. No gallop.    Pulmonary:      Effort: Pulmonary effort is normal. No respiratory distress.      Breath sounds: Normal breath sounds. No stridor. No wheezing, rhonchi or rales.   Abdominal:      General: Abdomen is flat. Bowel sounds are normal. There is no distension.      Palpations: Abdomen is soft. There is no mass.      Tenderness: There is no abdominal tenderness. There is no right CVA tenderness, left CVA tenderness or guarding.      Hernia: No hernia  is present.   Musculoskeletal:         General: Swelling, tenderness and signs of injury present.      Right knee: He exhibits decreased range of motion, swelling and effusion. Tenderness found. Lateral joint line tenderness noted. No medial joint line tenderness noted.   Lymphadenopathy:      Cervical: No cervical adenopathy.   Skin:     General: Skin is warm and dry.      Capillary Refill: Capillary refill takes less than 2 seconds.      Coloration: Skin is not jaundiced or pale.      Findings: No bruising, erythema or lesion.   Neurological:      General: No focal deficit present.      Mental Status: He is alert and oriented to person, place, and time. Mental status is at baseline.      Cranial Nerves: No cranial nerve deficit.      Sensory: No sensory deficit.      Motor: No weakness.      Coordination: Coordination normal.      Gait: Gait normal.   Psychiatric:         Mood and Affect: Mood normal.         Behavior: Behavior normal.         Thought Content: Thought content normal.         Judgment: Judgment normal.         Procedures           ED Course  ED Course as of Feb 13 0054   Sat Feb 13, 2021   0029 Discussed car e with patient. PAteint is instructed to follow-up with orthopedics for MRI if pain persist. Patient will be placed in knee immobilizer and crutches. Instructed to return if condition worsens.     [BH]      ED Course User Index  [BH] Romel Valentine PA-C                                           [facility]    Final diagnoses:   Sprain of right knee, unspecified ligament, initial encounter            Romel Valentine PA-C  02/13/21 0054

## 2021-02-23 ENCOUNTER — OFFICE VISIT (OUTPATIENT)
Dept: ORTHOPEDIC SURGERY | Facility: CLINIC | Age: 36
End: 2021-02-23

## 2021-02-23 VITALS — TEMPERATURE: 96.9 F | BODY MASS INDEX: 36.88 KG/M2 | HEIGHT: 70 IN | WEIGHT: 257.6 LBS | RESPIRATION RATE: 18 BRPM

## 2021-02-23 DIAGNOSIS — M23.91 INTERNAL DERANGEMENT OF RIGHT KNEE: ICD-10-CM

## 2021-02-23 DIAGNOSIS — M25.561 ARTHRALGIA OF RIGHT KNEE: Primary | ICD-10-CM

## 2021-02-23 PROCEDURE — 99213 OFFICE O/P EST LOW 20 MIN: CPT | Performed by: ORTHOPAEDIC SURGERY

## 2021-02-23 RX ORDER — CHOLECALCIFEROL (VITAMIN D3) 1250 MCG
CAPSULE ORAL
COMMUNITY
Start: 2021-01-20 | End: 2022-07-12 | Stop reason: SDUPTHER

## 2021-02-23 NOTE — PROGRESS NOTES
Subjective   Patient ID: Alen Rosales is a 36 y.o. male  Pain of the Right Knee (Reports a fall at home getting off the couch when he felt a pop and felt sudden pain on 2/19/21, seen at [facility] ER same day, given immobilizer, feels it popped put of place then back in later at home)             History of Present Illness  36-year-old with a history of left patellar dislocation in the past, status post weight loss surgery in December, works as a fork , had an episode of acute onset right knee pain with locking when he tried to get off his couch most the pain is lateral at the posterior aspect of his right knee was unable to fully extend it to the ER was evaluated thought to have possible internal derangement was given a knee immobilizer.  He relates he had several episodes of this similar type locking activities occurred again when he was sitting on the floor playing with his cat with his leg underneath his other leg in a twisted manner.  Denies history of prior right knee sport injury instability giving way.  His mother does have history of arthritis but has never been diagnosed with arthritis himself.  Was given a prescription for Indocin 50 mg in the ER which helped minimally.      Review of Systems   Constitutional: Negative for diaphoresis, fever and unexpected weight change.   HENT: Negative for dental problem and sore throat.    Eyes: Negative for visual disturbance.   Respiratory: Negative for shortness of breath.    Cardiovascular: Negative for chest pain.   Gastrointestinal: Negative for abdominal pain, constipation, diarrhea, nausea and vomiting.   Genitourinary: Negative for difficulty urinating and frequency.   Musculoskeletal: Positive for arthralgias (right knee).   Neurological: Negative for headaches.   Hematological: Does not bruise/bleed easily.       Past Medical History:   Diagnosis Date   • Asthma     AS CHILD   • Dyspepsia    • Dyspnea on exertion    • Elevated LDL cholesterol  level    • Fatigue    • Morbid obesity (CMS/HCC)         Past Surgical History:   Procedure Laterality Date   • DENTAL PROCEDURE      tooth pulled    • ENDOSCOPY N/A 12/11/2020    Procedure: ESOPHAGOGASTRODUODENOSCOPY;  Surgeon: Vince Jean MD;  [facility];  Service: Bariatric;  Laterality: N/A;   • GASTRIC SLEEVE LAPAROSCOPIC N/A 12/11/2020    Procedure: GASTRIC SLEEVE LAPAROSCOPIC;  Surgeon: Vince Jean MD;  [facility];  Service: Bariatric;  Laterality: N/A;   • HIATAL HERNIA REPAIR N/A 12/11/2020    Procedure: HIATAL HERNIA REPAIR LAPAROSCOPIC;  Surgeon: Vince Jean MD;  [facility];  Service: Bariatric;  Laterality: N/A;       Family History   Problem Relation Age of Onset   • Hypertension Mother    • Diabetes Mother    • Obesity Mother    • Sleep apnea Mother    • Coronary artery disease Father    • Hyperlipidemia Father    • Hypertension Father    • Heart attack Father    • Heart disease Father    • Drug abuse Brother    • Hypertension Brother    • COPD Maternal Grandmother    • Obesity Maternal Grandmother    • Diabetes Maternal Grandmother    • Hypertension Maternal Grandmother    • Heart disease Maternal Grandmother    • Heart attack Maternal Grandmother    • Kidney cancer Maternal Grandmother    • Cancer Maternal Grandmother         Bladder Cancer   • Hypertension Maternal Grandfather    • Stroke Maternal Grandfather    • Stroke Paternal Grandmother    • Hypertension Paternal Grandmother    • Stroke Paternal Grandfather    • Hypertension Paternal Grandfather    • Diabetes Paternal Grandfather        Social History     Socioeconomic History   • Marital status:      Spouse name: Not on file   • Number of children: Not on file   • Years of education: Not on file   • Highest education level: Not on file   Tobacco Use   • Smoking status: Never Smoker   • Smokeless tobacco: Never Used   Substance and Sexual Activity   • Alcohol use: Not Currently      "Alcohol/week: 1.0 standard drinks     Types: 1 Shots of liquor per week     Comment: socially   • Drug use: Never   • Sexual activity: Yes     Partners: Female   Social History Narrative    Lives in [place], [place] with wife Nahun Hartman.  Works full time at [employer] as a /  for 15yrs.     Right hand dominant       I have reviewed the medical and surgical history, family history, social history, medications, and/or allergies, and the review of systems of this report.    No Known Allergies      Current Outpatient Medications:   •  Cholecalciferol (Vitamin D3) 1.25 MG (02007 UT) capsule, TAKE 1 TABLET BY MOUTH 1 TIME PER WEEK FOR 12 DOSES., Disp: , Rfl:   •  indomethacin (INDOCIN) 50 MG capsule, Take 1 capsule by mouth 3 (Three) Times a Day With Meals., Disp: 15 capsule, Rfl: 0  •  omeprazole (priLOSEC) 40 MG capsule, TAKE 1 CAPSULE BY MOUTH EVERY DAY, Disp: 30 capsule, Rfl: 1    Objective   Temp 96.9 °F (36.1 °C) (Skin)   Resp 18   Ht 177.8 cm (70\")   Wt 117 kg (257 lb 9.6 oz)   BMI 36.96 kg/m²    Physical Exam  Constitutional: Patient is oriented to person, place, and time. Patient appears well-developed and well-nourished.   HENT:Head: Normocephalic and atraumatic.   Eyes: EOM are normal. Pupils are equal, round, and reactive to light.   Neck: Normal range of motion. Neck supple.   Cardiovascular: Normal rate.    Pulmonary/Chest: Effort normal and breath sounds normal.   Abdominal: Soft.   Neurological: Patient is alert and oriented to person, place, and time.   Skin: Skin is warm and dry.   Psychiatric: Patient has a normal mood and affect.   Nursing note and vitals reviewed.       [unfilled]   Right knee: Trace effusion full active extension some pain over the posterior lateral joint line no pain over the medial joint line Remy sign minimally positive for posterior lateral joint line pain only, Lachman sign negative no medial lateral instability with varus valgus " stress no sign of patellar subluxation apprehension or significant ecchymosis at the peripatellar area.  Neurovascularly intact    Assessment/Plan   Review of Radiographic Studies:    No new imaging done today.      Procedures     Diagnoses and all orders for this visit:    1. Arthralgia of right knee (Primary)  -     XR Knee 1 or 2 View Right; Future       Orthopedic activities reviewed and patient expressed appreciation, Risk, benefits, and merits of treatment alternatives reviewed with the patient and questions answered, Using illustrations and models, the nature of the pathology was explained to the patient, Use brace as instructed and Work status form completed and provided to patient      Recommendations/Plan:   Work/Activity Status: Unable to return to work until next evaluation    Patient agreeable to call or return sooner for any concerns.         Avoid anti-inflammatory medications with recent history of gastric bypass    Impression:  Mechanical locking right knee rule out meniscal tear and/or loose body, history of recent weight loss surgery  Plan:  MRI right knee to rule out meniscal tear and/or loose body, note to keep him out of work, hinged knee brace to begin gentle range of motion return to see me after MRI complete

## 2021-02-26 ENCOUNTER — HOSPITAL ENCOUNTER (OUTPATIENT)
Dept: MRI IMAGING | Facility: HOSPITAL | Age: 36
Discharge: HOME | End: 2021-02-26
Admitting: ORTHOPAEDIC SURGERY

## 2021-02-26 PROCEDURE — 73721 MRI JNT OF LWR EXTRE W/O DYE: CPT

## 2021-03-02 RX ORDER — OMEPRAZOLE 40 MG/1
CAPSULE, DELAYED RELEASE ORAL
Qty: 30 CAPSULE | Refills: 1 | OUTPATIENT
Start: 2021-03-02

## 2021-03-03 ENCOUNTER — OFFICE VISIT (OUTPATIENT)
Dept: ORTHOPEDIC SURGERY | Facility: CLINIC | Age: 36
End: 2021-03-03

## 2021-03-03 VITALS — BODY MASS INDEX: 36.79 KG/M2 | WEIGHT: 257 LBS | RESPIRATION RATE: 18 BRPM | HEIGHT: 70 IN

## 2021-03-03 DIAGNOSIS — M23.91 INTERNAL DERANGEMENT OF RIGHT KNEE: Primary | ICD-10-CM

## 2021-03-03 PROCEDURE — 99213 OFFICE O/P EST LOW 20 MIN: CPT | Performed by: ORTHOPAEDIC SURGERY

## 2021-03-03 NOTE — PROGRESS NOTES
Subjective   Patient ID: Alen Rosales is a 36 y.o. male  Follow-up of the Right Knee (MRI results, reports the knee feels some better)             History of Present Illness  36-year-old returns with an MRI right knee that did show a very small anterolateral meniscal tear effusion and patellofemoral chondromalacia.  His symptoms have improved significant with use of his brace he no longer feels the need for crutches is able to fully weight-bear would like to return to work tomorrow.  He said no further episodes of locking giving way or pain at night at rest.      Review of Systems   Constitutional: Negative for diaphoresis, fever and unexpected weight change.   HENT: Negative for dental problem and sore throat.    Eyes: Negative for visual disturbance.   Respiratory: Negative for shortness of breath.    Cardiovascular: Negative for chest pain.   Gastrointestinal: Negative for abdominal pain, constipation, diarrhea, nausea and vomiting.   Genitourinary: Negative for difficulty urinating and frequency.   Musculoskeletal: Positive for arthralgias (right knee) and joint swelling (right knee, decreased).   Neurological: Negative for headaches.   Hematological: Does not bruise/bleed easily.       Past Medical History:   Diagnosis Date   • Asthma     AS CHILD   • Dyspepsia    • Dyspnea on exertion    • Elevated LDL cholesterol level    • Fatigue    • Morbid obesity (CMS/HCC)         Past Surgical History:   Procedure Laterality Date   • DENTAL PROCEDURE      tooth pulled    • ENDOSCOPY N/A 12/11/2020    Procedure: ESOPHAGOGASTRODUODENOSCOPY;  Surgeon: Vince Jean MD;  [facility];  Service: Bariatric;  Laterality: N/A;   • GASTRIC SLEEVE LAPAROSCOPIC N/A 12/11/2020    Procedure: GASTRIC SLEEVE LAPAROSCOPIC;  Surgeon: Vince Jean MD;  [facility];  Service: Bariatric;  Laterality: N/A;   • HIATAL HERNIA REPAIR N/A 12/11/2020    Procedure: HIATAL HERNIA REPAIR LAPAROSCOPIC;  Surgeon:  Vince Jean MD;  Location: [facility];  Service: Bariatric;  Laterality: N/A;       Family History   Problem Relation Age of Onset   • Hypertension Mother    • Diabetes Mother    • Obesity Mother    • Sleep apnea Mother    • Coronary artery disease Father    • Hyperlipidemia Father    • Hypertension Father    • Heart attack Father    • Heart disease Father    • Drug abuse Brother    • Hypertension Brother    • COPD Maternal Grandmother    • Obesity Maternal Grandmother    • Diabetes Maternal Grandmother    • Hypertension Maternal Grandmother    • Heart disease Maternal Grandmother    • Heart attack Maternal Grandmother    • Kidney cancer Maternal Grandmother    • Cancer Maternal Grandmother         Bladder Cancer   • Hypertension Maternal Grandfather    • Stroke Maternal Grandfather    • Stroke Paternal Grandmother    • Hypertension Paternal Grandmother    • Stroke Paternal Grandfather    • Hypertension Paternal Grandfather    • Diabetes Paternal Grandfather        Social History     Socioeconomic History   • Marital status:      Spouse name: Not on file   • Number of children: Not on file   • Years of education: Not on file   • Highest education level: Not on file   Tobacco Use   • Smoking status: Never Smoker   • Smokeless tobacco: Never Used   Substance and Sexual Activity   • Alcohol use: Not Currently     Alcohol/week: 1.0 standard drinks     Types: 1 Shots of liquor per week     Comment: socially   • Drug use: Never   • Sexual activity: Yes     Partners: Female   Social History Narrative    Lives in [place], [place] with wife Nahun Hartman.  Works full time at [employer] as a /  for 15yrs.     Right hand dominant       I have reviewed the medical and surgical history, family history, social history, medications, and/or allergies, and the review of systems of this report.    No Known Allergies      Current Outpatient Medications:   •  Cholecalciferol (Vitamin D3)  "1.25 MG (67893 UT) capsule, TAKE 1 TABLET BY MOUTH 1 TIME PER WEEK FOR 12 DOSES., Disp: , Rfl:   •  omeprazole (priLOSEC) 40 MG capsule, TAKE 1 CAPSULE BY MOUTH EVERY DAY, Disp: 30 capsule, Rfl: 1    Objective   Resp 18   Ht 177.8 cm (70\")   Wt 117 kg (257 lb)   BMI 36.88 kg/m²    Physical Exam  Constitutional: Patient is oriented to person, place, and time. Patient appears well-developed and well-nourished.   HENT:Head: Normocephalic and atraumatic.   Eyes: EOM are normal. Pupils are equal, round, and reactive to light.   Neck: Normal range of motion. Neck supple.   Cardiovascular: Normal rate.    Pulmonary/Chest: Effort normal and breath sounds normal.   Abdominal: Soft.   Neurological: Patient is alert and oriented to person, place, and time.   Skin: Skin is warm and dry.   Psychiatric: Patient has a normal mood and affect.   Nursing note and vitals reviewed.       [unfilled]   Right knee: Trace effusion very minimal tenderness anterolateral joint line no sign of ligamentous instability good patellofemoral tracking no medial joint line pain Remy sign is still mildly positive for anterolateral joint line pain only    Assessment/Plan   Review of Radiographic Studies:    No new imaging done today.  MR confirms a small anterolateral meniscal tear slight effusion and patellofemoral chondromalacia at the lateral patellofemoral compartment      Procedures     Diagnoses and all orders for this visit:    1. Internal derangement of right knee (Primary)       Orthopedic activities reviewed and patient expressed appreciation, Risk, benefits, and merits of treatment alternatives reviewed with the patient and questions answered, Using illustrations and models, the nature of the pathology was explained to the patient and Work status form completed and provided to patient      Recommendations/Plan:   Work/Activity Status: Return to regular work duty tomorrow    Patient agreeable to call or return sooner for any " concerns.         I discussed and reviewed with him the nature of lateral meniscal tear with effusion and patellofemoral chondromalacia with the option of diagnostic arthroscopy chondroplasty partial lateral meniscectomy.  He would like to return to work tomorrow if he has further symptoms he will probably go ahead and choose to have the surgical treatment for partial meniscectomy and chondroplasty.    Impression:  Right anterolateral knee pain lateral meniscal tear effusion chondromalacia patellofemoral compartment  Plan:  Trial of return to regular work duty tomorrow recheck with me in a month if still symptomatic at that time we will probably go ahead and schedule diagnostic arthroscopy partial lateral meniscectomy and chondroplasty at that time

## 2021-03-15 ENCOUNTER — OFFICE VISIT (OUTPATIENT)
Dept: BARIATRICS/WEIGHT MGMT | Facility: CLINIC | Age: 36
End: 2021-03-15

## 2021-03-15 VITALS
WEIGHT: 249.5 LBS | SYSTOLIC BLOOD PRESSURE: 128 MMHG | RESPIRATION RATE: 18 BRPM | TEMPERATURE: 97.5 F | HEART RATE: 82 BPM | HEIGHT: 70 IN | BODY MASS INDEX: 35.72 KG/M2 | DIASTOLIC BLOOD PRESSURE: 78 MMHG | OXYGEN SATURATION: 99 %

## 2021-03-15 DIAGNOSIS — Z13.21 MALNUTRITION SCREEN: ICD-10-CM

## 2021-03-15 DIAGNOSIS — R10.13 DYSPEPSIA: ICD-10-CM

## 2021-03-15 DIAGNOSIS — R53.83 FATIGUE, UNSPECIFIED TYPE: ICD-10-CM

## 2021-03-15 DIAGNOSIS — E66.9 OBESITY, CLASS II, BMI 35-39.9: Primary | ICD-10-CM

## 2021-03-15 DIAGNOSIS — R79.0 ABNORMAL BLOOD LEVEL OF IRON: ICD-10-CM

## 2021-03-15 DIAGNOSIS — E55.9 VITAMIN D DEFICIENCY: ICD-10-CM

## 2021-03-15 PROCEDURE — 99214 OFFICE O/P EST MOD 30 MIN: CPT | Performed by: PHYSICIAN ASSISTANT

## 2021-03-15 RX ORDER — URSODIOL 300 MG/1
300 CAPSULE ORAL 2 TIMES DAILY
COMMUNITY
End: 2022-05-06

## 2021-03-15 NOTE — PROGRESS NOTES
[facility] Bariatric Surgery  [address]  [address]  [facility] [address]  [phone]        Patient Name:  Alen Rosales  :  1985      Date of Visit: 03/15/2021      Reason for Visit:   3 months postop      HPI: Alen Rosales is a 36 y.o. male s/p LSG/HHR 20 GDW    Doing well.  Denies dysphagia, reflux, nausea, vomiting and abdominal pain.  Getting 90-100g prot/day.  Has a little constipation, but doesn't really want to bother w/ taking any supplements.  Has tried Miralax in the past, but ran out.  Says increasing his fiber typically helps.  Physical activity: limited d/t recent knee injury/torn cartilage.    Labs 21 - prealb 19.9, o/w okay.   Wearing PatchAid vitamins - MVI, iron, B12, and Vit D. Finishing wkly Vit D RX.       Presurgery weight: 295 pounds.  Today's weight is 113 kg (249 lb 8 oz) pounds, today's  Body mass index is 35.8 kg/m²., and weight loss since surgery is 46 pounds.      Past Medical History:   Diagnosis Date   • Asthma     AS CHILD   • Dyspepsia    • Dyspnea on exertion    • Elevated LDL cholesterol level    • Fatigue    • Morbid obesity (CMS/HCC)      Past Surgical History:   Procedure Laterality Date   • DENTAL PROCEDURE      tooth pulled    • ENDOSCOPY N/A 2020    Procedure: ESOPHAGOGASTRODUODENOSCOPY;  Surgeon: Vince Jean MD;  Location: [facility] OR;  Service: Bariatric;  Laterality: N/A;   • GASTRIC SLEEVE LAPAROSCOPIC N/A 2020    Procedure: GASTRIC SLEEVE LAPAROSCOPIC;  Surgeon: Vince Jean MD;  [facility] OR;  Service: Bariatric;  Laterality: N/A;   • HIATAL HERNIA REPAIR N/A 2020    Procedure: HIATAL HERNIA REPAIR LAPAROSCOPIC;  Surgeon: Vince Jean MD;  [facility]:  [facility] OR;  Service: Bariatric;  Laterality: N/A;     Outpatient Medications Marked as Taking for the 3/15/21 encounter (Office Visit) with Lilliana Barbour PA   Medication Sig Dispense Refill   • Cholecalciferol  "(Vitamin D3) 1.25 MG (65611 UT) capsule TAKE 1 TABLET BY MOUTH 1 TIME PER WEEK FOR 12 DOSES.     • ursodiol (ACTIGALL) 300 MG capsule Take 300 mg by mouth 2 (Two) Times a Day.         No Known Allergies    Social History     Socioeconomic History   • Marital status:      Spouse name: Not on file   • Number of children: Not on file   • Years of education: Not on file   • Highest education level: Not on file   Tobacco Use   • Smoking status: Never Smoker   • Smokeless tobacco: Never Used   Vaping Use   • Vaping Use: Never used   Substance and Sexual Activity   • Alcohol use: Not Currently     Alcohol/week: 1.0 standard drinks     Types: 1 Shots of liquor per week     Comment: socially   • Drug use: Never   • Sexual activity: Yes     Partners: Female       /78 (BP Location: Left arm, Patient Position: Sitting, Cuff Size: Adult)   Pulse 82   Temp 97.5 °F (36.4 °C) (Temporal)   Resp 18   Ht 177.8 cm (70\")   Wt 113 kg (249 lb 8 oz)   SpO2 99%   BMI 35.80 kg/m²     Physical Exam  Constitutional:       Appearance: He is well-developed.      Comments: wearing a mask   Cardiovascular:      Rate and Rhythm: Normal rate and regular rhythm.   Pulmonary:      Effort: Pulmonary effort is normal.   Abdominal:      Palpations: Abdomen is soft.      Tenderness: There is no abdominal tenderness.      Hernia: No hernia is present.   Musculoskeletal:         General: Normal range of motion.   Skin:     General: Skin is warm and dry.   Neurological:      Mental Status: He is alert.           Assessment:  3 months s/p LSG/HHR 12/11/20 GDW    ICD-10-CM ICD-9-CM   1. Obesity, Class II, BMI 35-39.9  E66.9 278.00   2. Malnutrition screen  Z13.21 V77.2   3. Fatigue, unspecified type  R53.83 780.79   4. Dyspepsia  R10.13 536.8   5. Vitamin D deficiency  E55.9 268.9   6. Abnormal blood level of iron  R79.0 790.6       Patient's Body mass index is 35.8 kg/m². BMI is above normal parameters. Recommendations include: exercise " counseling and nutrition counseling.    Plan:  Doing fine.  Continue w/ good food choices and healthy habits.  Continue protein >70g/day.  Continue routine physical activity as able.  Routine bariatric labs ordered.  Continue vitamins w/ adjustments pending lab results.  Call w/ problems/concerns.     The patient was instructed to follow up in 3 months, sooner if needed.

## 2021-03-25 ENCOUNTER — TELEPHONE (OUTPATIENT)
Dept: ORTHOPEDIC SURGERY | Facility: CLINIC | Age: 36
End: 2021-03-25

## 2021-03-25 NOTE — TELEPHONE ENCOUNTER
Caller: ANSLEY GARCIA    Relationship: SELF    Best call back number: [phone]    What form or medical record are you requesting: FMLA/[employer]    Who is requesting this form or medical record from you: HUI    How would you like to receive the form or medical records (pick-up, mail, fax): FAX  If fax, what is the fax number: [phone]    Timeframe paperwork needed: BY 3/30    Additional notes: PT IS CHECKING ON WHETHER OR NOT THIS PAPERWORK HAS BEEN FAXED OVER YET.

## 2021-03-25 NOTE — TELEPHONE ENCOUNTER
I spoke with pt to inform we have not received the [facility] paperwork, he will call and have them send to my attn

## 2021-03-26 ENCOUNTER — LAB (OUTPATIENT)
Dept: LAB | Facility: HOSPITAL | Age: 36
End: 2021-03-26

## 2021-03-26 ENCOUNTER — OFFICE VISIT (OUTPATIENT)
Dept: INTERNAL MEDICINE | Facility: CLINIC | Age: 36
End: 2021-03-26

## 2021-03-26 VITALS
BODY MASS INDEX: 36.13 KG/M2 | DIASTOLIC BLOOD PRESSURE: 80 MMHG | WEIGHT: 252.4 LBS | OXYGEN SATURATION: 98 % | HEIGHT: 70 IN | SYSTOLIC BLOOD PRESSURE: 122 MMHG | HEART RATE: 69 BPM | TEMPERATURE: 97.5 F

## 2021-03-26 DIAGNOSIS — R79.0 ABNORMAL BLOOD LEVEL OF IRON: ICD-10-CM

## 2021-03-26 DIAGNOSIS — E55.9 VITAMIN D DEFICIENCY: ICD-10-CM

## 2021-03-26 DIAGNOSIS — R10.13 DYSPEPSIA: ICD-10-CM

## 2021-03-26 DIAGNOSIS — Z11.59 ENCOUNTER FOR HEPATITIS C SCREENING TEST FOR LOW RISK PATIENT: ICD-10-CM

## 2021-03-26 DIAGNOSIS — R53.83 FATIGUE, UNSPECIFIED TYPE: ICD-10-CM

## 2021-03-26 DIAGNOSIS — Z13.21 MALNUTRITION SCREEN: ICD-10-CM

## 2021-03-26 DIAGNOSIS — E66.9 CLASS 2 OBESITY WITHOUT SERIOUS COMORBIDITY WITH BODY MASS INDEX (BMI) OF 36.0 TO 36.9 IN ADULT, UNSPECIFIED OBESITY TYPE: ICD-10-CM

## 2021-03-26 DIAGNOSIS — Z00.00 WELL ADULT EXAM: Primary | ICD-10-CM

## 2021-03-26 LAB
25(OH)D3 SERPL-MCNC: 71.1 NG/ML (ref 30–100)
ALBUMIN SERPL-MCNC: 4.4 G/DL (ref 3.5–5.2)
ALBUMIN/GLOB SERPL: 1.9 G/DL
ALP SERPL-CCNC: 67 U/L (ref 39–117)
ALT SERPL W P-5'-P-CCNC: 14 U/L (ref 1–41)
ANION GAP SERPL CALCULATED.3IONS-SCNC: 6 MMOL/L (ref 5–15)
AST SERPL-CCNC: 13 U/L (ref 1–40)
BASOPHILS # BLD AUTO: 0.04 10*3/MM3 (ref 0–0.2)
BASOPHILS NFR BLD AUTO: 0.8 % (ref 0–1.5)
BILIRUB SERPL-MCNC: 0.3 MG/DL (ref 0–1.2)
BUN SERPL-MCNC: 22 MG/DL (ref 6–20)
BUN/CREAT SERPL: 23.9 (ref 7–25)
CALCIUM SPEC-SCNC: 9.3 MG/DL (ref 8.6–10.5)
CHLORIDE SERPL-SCNC: 102 MMOL/L (ref 98–107)
CO2 SERPL-SCNC: 29 MMOL/L (ref 22–29)
CREAT SERPL-MCNC: 0.92 MG/DL (ref 0.76–1.27)
DEPRECATED RDW RBC AUTO: 43 FL (ref 37–54)
EOSINOPHIL # BLD AUTO: 0.09 10*3/MM3 (ref 0–0.4)
EOSINOPHIL NFR BLD AUTO: 1.8 % (ref 0.3–6.2)
ERYTHROCYTE [DISTWIDTH] IN BLOOD BY AUTOMATED COUNT: 14 % (ref 12.3–15.4)
FERRITIN SERPL-MCNC: 207 NG/ML (ref 30–400)
FOLATE SERPL-MCNC: 4.89 NG/ML (ref 4.78–24.2)
GFR SERPL CREATININE-BSD FRML MDRD: 94 ML/MIN/1.73
GLOBULIN UR ELPH-MCNC: 2.3 GM/DL
GLUCOSE SERPL-MCNC: 88 MG/DL (ref 65–99)
HCT VFR BLD AUTO: 45.6 % (ref 37.5–51)
HCV AB SER DONR QL: NORMAL
HGB BLD-MCNC: 15.1 G/DL (ref 13–17.7)
IMM GRANULOCYTES # BLD AUTO: 0.01 10*3/MM3 (ref 0–0.05)
IMM GRANULOCYTES NFR BLD AUTO: 0.2 % (ref 0–0.5)
IRON 24H UR-MRATE: 60 MCG/DL (ref 59–158)
LYMPHOCYTES # BLD AUTO: 1.9 10*3/MM3 (ref 0.7–3.1)
LYMPHOCYTES NFR BLD AUTO: 38.6 % (ref 19.6–45.3)
MCH RBC QN AUTO: 28.2 PG (ref 26.6–33)
MCHC RBC AUTO-ENTMCNC: 33.1 G/DL (ref 31.5–35.7)
MCV RBC AUTO: 85.2 FL (ref 79–97)
MONOCYTES # BLD AUTO: 0.38 10*3/MM3 (ref 0.1–0.9)
MONOCYTES NFR BLD AUTO: 7.7 % (ref 5–12)
NEUTROPHILS NFR BLD AUTO: 2.5 10*3/MM3 (ref 1.7–7)
NEUTROPHILS NFR BLD AUTO: 50.9 % (ref 42.7–76)
NRBC BLD AUTO-RTO: 0 /100 WBC (ref 0–0.2)
PLATELET # BLD AUTO: 250 10*3/MM3 (ref 140–450)
PMV BLD AUTO: 10.4 FL (ref 6–12)
POTASSIUM SERPL-SCNC: 4.8 MMOL/L (ref 3.5–5.2)
PREALB SERPL-MCNC: 24.9 MG/DL (ref 20–40)
PROT SERPL-MCNC: 6.7 G/DL (ref 6–8.5)
RBC # BLD AUTO: 5.35 10*6/MM3 (ref 4.14–5.8)
SODIUM SERPL-SCNC: 137 MMOL/L (ref 136–145)
WBC # BLD AUTO: 4.92 10*3/MM3 (ref 3.4–10.8)

## 2021-03-26 PROCEDURE — 86803 HEPATITIS C AB TEST: CPT | Performed by: FAMILY MEDICINE

## 2021-03-26 PROCEDURE — 83921 ORGANIC ACID SINGLE QUANT: CPT

## 2021-03-26 PROCEDURE — 82728 ASSAY OF FERRITIN: CPT

## 2021-03-26 PROCEDURE — 82306 VITAMIN D 25 HYDROXY: CPT

## 2021-03-26 PROCEDURE — 84134 ASSAY OF PREALBUMIN: CPT

## 2021-03-26 PROCEDURE — 85025 COMPLETE CBC W/AUTO DIFF WBC: CPT

## 2021-03-26 PROCEDURE — 99395 PREV VISIT EST AGE 18-39: CPT | Performed by: FAMILY MEDICINE

## 2021-03-26 PROCEDURE — 84425 ASSAY OF VITAMIN B-1: CPT

## 2021-03-26 PROCEDURE — 80053 COMPREHEN METABOLIC PANEL: CPT

## 2021-03-26 PROCEDURE — 83540 ASSAY OF IRON: CPT

## 2021-03-26 PROCEDURE — 36415 COLL VENOUS BLD VENIPUNCTURE: CPT

## 2021-03-26 PROCEDURE — 82746 ASSAY OF FOLIC ACID SERUM: CPT

## 2021-03-26 NOTE — ASSESSMENT & PLAN NOTE
Unremarkable PE findings.  Patient counseled today on vaccines, dental/eye health, healthy diet and exercise, hepatitis C screening.  Will add hepatitis C screening to labs for bariatrics.  He has had a lipid panel in the last year.  He is primarily up to date on health maintenance and continues to do well with weight loss.

## 2021-03-26 NOTE — PROGRESS NOTES
Alen Rosales is a 36 y.o. male.    Chief Complaint   Patient presents with   • Annual Exam       HPI   Patient presents today for an annual physical exam.  He reports being up to date on dental and eye exams.  He has had a dental exam in the last few months and eye exam imn the last 2 years.  He is trying to continue to eat healthy.  He is s/p gastric sleeve.  He has lost about 50lbs since the surgery last fall.  Recently tore right meniscus just getting up from the couch. He is seeing ortho for this.  Unable to exercise regularly due to recent knee injury. He had a tdap in 2017.  Has not received the flu vaccine this season. He has not had hepatitis C screening.   He is planning on doing labs for bariatrics later today.     The following portions of the patient's history were reviewed and updated as appropriate: allergies, current medications, past family history, past medical history, past social history, past surgical history and problem list.     Past Medical History:   Diagnosis Date   • Asthma     AS CHILD   • Dyspepsia    • Dyspnea on exertion    • Elevated LDL cholesterol level    • Fatigue    • Morbid obesity (CMS/HCC)        Past Surgical History:   Procedure Laterality Date   • DENTAL PROCEDURE      tooth pulled    • ENDOSCOPY N/A 12/11/2020    Procedure: ESOPHAGOGASTRODUODENOSCOPY;  Surgeon: Vince Jean MD;  Location:  [facility] OR;  Service: Bariatric;  Laterality: N/A;   • GASTRIC SLEEVE LAPAROSCOPIC N/A 12/11/2020    Procedure: GASTRIC SLEEVE LAPAROSCOPIC;  Surgeon: Vince Jean MD;  Location:  [facility] OR;  Service: Bariatric;  Laterality: N/A;   • HIATAL HERNIA REPAIR N/A 12/11/2020    Procedure: HIATAL HERNIA REPAIR LAPAROSCOPIC;  Surgeon: Vince Jean MD;  Location:  [facility] OR;  Service: Bariatric;  Laterality: N/A;       Family History   Problem Relation Age of Onset   • Hypertension Mother    • Diabetes Mother    • Obesity Mother    • Sleep apnea Mother    • Coronary  artery disease Father    • Hyperlipidemia Father    • Hypertension Father    • Heart attack Father    • Heart disease Father    • Drug abuse Brother    • Hypertension Brother    • COPD Maternal Grandmother    • Obesity Maternal Grandmother    • Diabetes Maternal Grandmother    • Hypertension Maternal Grandmother    • Heart disease Maternal Grandmother    • Heart attack Maternal Grandmother    • Kidney cancer Maternal Grandmother    • Cancer Maternal Grandmother         Bladder Cancer   • Hypertension Maternal Grandfather    • Stroke Maternal Grandfather    • Stroke Paternal Grandmother    • Hypertension Paternal Grandmother    • Stroke Paternal Grandfather    • Hypertension Paternal Grandfather    • Diabetes Paternal Grandfather        Social History     Socioeconomic History   • Marital status:      Spouse name: Not on file   • Number of children: Not on file   • Years of education: Not on file   • Highest education level: Not on file   Tobacco Use   • Smoking status: Never Smoker   • Smokeless tobacco: Never Used   Vaping Use   • Vaping Use: Never used   Substance and Sexual Activity   • Alcohol use: Not Currently     Alcohol/week: 1.0 standard drinks     Types: 1 Shots of liquor per week     Comment: socially   • Drug use: Never   • Sexual activity: Yes     Partners: Female       No Known Allergies      Current Outpatient Medications:   •  Cholecalciferol (Vitamin D3) 1.25 MG (74691 UT) capsule, TAKE 1 TABLET BY MOUTH 1 TIME PER WEEK FOR 12 DOSES., Disp: , Rfl:   •  Unable to find, 1 each 1 (One) Time. Vitamin Patch, Disp: , Rfl:   •  ursodiol (ACTIGALL) 300 MG capsule, Take 300 mg by mouth 2 (Two) Times a Day., Disp: , Rfl:     ROS    Review of Systems   Constitutional: Negative for chills, fatigue and fever.   HENT: Negative for congestion, postnasal drip and sore throat.    Eyes: Negative for blurred vision and visual disturbance.   Respiratory: Negative for cough, shortness of breath and wheezing.   "  Cardiovascular: Negative for chest pain and leg swelling.   Gastrointestinal: Negative for abdominal pain, constipation, diarrhea, nausea and vomiting.   Endocrine: Positive for cold intolerance. Negative for heat intolerance.   Genitourinary: Negative for dysuria and frequency.   Musculoskeletal: Positive for arthralgias (knee pain). Negative for back pain.   Skin: Negative for color change and rash.   Allergic/Immunologic: Negative for environmental allergies.   Neurological: Negative for weakness, numbness and headache.   Hematological: Does not bruise/bleed easily.   Psychiatric/Behavioral: Negative for depressed mood. The patient is not nervous/anxious.        Vitals:    03/26/21 1310   BP: 122/80   BP Location: Left arm   Patient Position: Sitting   Cuff Size: Adult   Pulse: 69   Temp: 97.5 °F (36.4 °C)   TempSrc: Temporal   SpO2: 98%   Weight: 114 kg (252 lb 6.4 oz)   Height: 177.8 cm (70\")     Body mass index is 36.22 kg/m².    Physical Exam     Physical Exam  Constitutional:       General: He is not in acute distress.     Appearance: Normal appearance. He is well-developed.   HENT:      Head: Normocephalic and atraumatic.      Right Ear: External ear normal.      Left Ear: External ear normal.   Eyes:      Extraocular Movements: Extraocular movements intact.      Conjunctiva/sclera: Conjunctivae normal.      Pupils: Pupils are equal, round, and reactive to light.   Cardiovascular:      Rate and Rhythm: Normal rate and regular rhythm.      Heart sounds: No murmur heard.     Pulmonary:      Effort: Pulmonary effort is normal. No respiratory distress.      Breath sounds: Normal breath sounds. No wheezing.   Abdominal:      General: Bowel sounds are normal. There is no distension.      Palpations: Abdomen is soft.      Tenderness: There is no abdominal tenderness.   Musculoskeletal:         General: Normal range of motion.      Cervical back: Normal range of motion and neck supple.      Right lower leg: No " edema.      Left lower leg: No edema.   Lymphadenopathy:      Cervical: No cervical adenopathy.   Skin:     General: Skin is warm and dry.   Neurological:      Mental Status: He is alert and oriented to person, place, and time.      Cranial Nerves: No cranial nerve deficit.   Psychiatric:         Mood and Affect: Mood normal.         Behavior: Behavior normal.         Assessment/Plan    Problems Addressed this Visit        Endocrine and Metabolic    Class 2 obesity without serious comorbidity in adult     S/p Gastric sleeve.  Patient doing very well.  BMI down to 36 from 43.              Health Encounters    Well adult exam - Primary     Unremarkable PE findings.  Patient counseled today on vaccines, dental/eye health, healthy diet and exercise, hepatitis C screening.  Will add hepatitis C screening to labs for bariatrics.  He has had a lipid panel in the last year.  He is primarily up to date on health maintenance and continues to do well with weight loss.            Other Visit Diagnoses     Encounter for hepatitis C screening test for low risk patient        Relevant Orders    Hepatitis C Antibody        No orders of the defined types were placed in this encounter.      No orders of the defined types were placed in this encounter.      Return in about 1 year (around 3/26/2022) for Annual.      Dian Leonard DO

## 2021-03-31 ENCOUNTER — OFFICE VISIT (OUTPATIENT)
Dept: ORTHOPEDIC SURGERY | Facility: CLINIC | Age: 36
End: 2021-03-31

## 2021-03-31 VITALS — TEMPERATURE: 98 F | WEIGHT: 253 LBS | BODY MASS INDEX: 36.22 KG/M2 | HEIGHT: 70 IN

## 2021-03-31 DIAGNOSIS — M94.261 CHONDROMALACIA OF RIGHT KNEE: Primary | ICD-10-CM

## 2021-03-31 PROCEDURE — 99213 OFFICE O/P EST LOW 20 MIN: CPT | Performed by: ORTHOPAEDIC SURGERY

## 2021-03-31 NOTE — PROGRESS NOTES
Subjective   Patient ID: Alen Rosales is a 36 y.o. male  Follow-up of the Right Knee (Here for four week recheck of lateral meniscal tear. Has been back to work full duty since last visit.  States knee has greatly improved.)             History of Present Illness  He returns for follow-up check of his lateral meniscal tear right knee he has had no symptoms of pain swelling locking giving way or any pain at all in his right knee since his last visit, has been able to do his full work duties.      Review of Systems   Constitutional: Negative for diaphoresis, fever and unexpected weight change.   HENT: Negative for dental problem and sore throat.    Eyes: Negative for visual disturbance.   Respiratory: Negative for shortness of breath.    Cardiovascular: Negative for chest pain.   Gastrointestinal: Negative for abdominal pain, constipation, diarrhea, nausea and vomiting.   Genitourinary: Negative for difficulty urinating and frequency.   Musculoskeletal: Positive for arthralgias.   Neurological: Negative for headaches.   Hematological: Does not bruise/bleed easily.   All other systems reviewed and are negative.      Past Medical History:   Diagnosis Date   • Asthma     AS CHILD   • Dyspepsia    • Dyspnea on exertion    • Elevated LDL cholesterol level    • Fatigue    • Morbid obesity (CMS/HCC)         Past Surgical History:   Procedure Laterality Date   • DENTAL PROCEDURE      tooth pulled    • ENDOSCOPY N/A 12/11/2020    Procedure: ESOPHAGOGASTRODUODENOSCOPY;  Surgeon: Vince Jean MD;  Location:  [facility] OR;  Service: Bariatric;  Laterality: N/A;   • GASTRIC SLEEVE LAPAROSCOPIC N/A 12/11/2020    Procedure: GASTRIC SLEEVE LAPAROSCOPIC;  Surgeon: Vince Jean MD;  [facility] OR;  Service: Bariatric;  Laterality: N/A;   • HIATAL HERNIA REPAIR N/A 12/11/2020    Procedure: HIATAL HERNIA REPAIR LAPAROSCOPIC;  Surgeon: Vince Jean MD;  Location:  [facility] OR;  Service: Bariatric;  Laterality:  N/A;       Family History   Problem Relation Age of Onset   • Hypertension Mother    • Diabetes Mother    • Obesity Mother    • Sleep apnea Mother    • Coronary artery disease Father    • Hyperlipidemia Father    • Hypertension Father    • Heart attack Father    • Heart disease Father    • Drug abuse Brother    • Hypertension Brother    • COPD Maternal Grandmother    • Obesity Maternal Grandmother    • Diabetes Maternal Grandmother    • Hypertension Maternal Grandmother    • Heart disease Maternal Grandmother    • Heart attack Maternal Grandmother    • Kidney cancer Maternal Grandmother    • Cancer Maternal Grandmother         Bladder Cancer   • Hypertension Maternal Grandfather    • Stroke Maternal Grandfather    • Stroke Paternal Grandmother    • Hypertension Paternal Grandmother    • Stroke Paternal Grandfather    • Hypertension Paternal Grandfather    • Diabetes Paternal Grandfather        Social History     Socioeconomic History   • Marital status:      Spouse name: Not on file   • Number of children: Not on file   • Years of education: Not on file   • Highest education level: Not on file   Tobacco Use   • Smoking status: Never Smoker   • Smokeless tobacco: Never Used   Vaping Use   • Vaping Use: Never used   Substance and Sexual Activity   • Alcohol use: Not Currently     Alcohol/week: 1.0 standard drinks     Types: 1 Shots of liquor per week     Comment: socially   • Drug use: Never   • Sexual activity: Yes     Partners: Female       I have reviewed the medical and surgical history, family history, social history, medications, and/or allergies, and the review of systems of this report.    No Known Allergies      Current Outpatient Medications:   •  Cholecalciferol (Vitamin D3) 1.25 MG (21272 UT) capsule, TAKE 1 TABLET BY MOUTH 1 TIME PER WEEK FOR 12 DOSES., Disp: , Rfl:   •  Unable to find, 1 each 1 (One) Time. Vitamin Patch, Disp: , Rfl:   •  ursodiol (ACTIGALL) 300 MG capsule, Take 300 mg by mouth 2  "(Two) Times a Day., Disp: , Rfl:     Objective   Temp 98 °F (36.7 °C)   Ht 177.8 cm (70\")   Wt 115 kg (253 lb)   BMI 36.30 kg/m²    Physical Exam  Constitutional: Patient is oriented to person, place, and time. Patient appears well-developed and well-nourished.   HENT:Head: Normocephalic and atraumatic.   Eyes: EOM are normal. Pupils are equal, round, and reactive to light.   Neck: Normal range of motion. Neck supple.   Cardiovascular: Normal rate.    Pulmonary/Chest: Effort normal and breath sounds normal.   Abdominal: Soft.   Neurological: Patient is alert and oriented to person, place, and time.   Skin: Skin is warm and dry.   Psychiatric: Patient has a normal mood and affect.   Nursing note and vitals reviewed.       [unfilled]   Right knee: No effusion full range of motion Remy sign negative for medial or lateral joint line pain ligament exam normal patellofemoral crepitus minimal neurovascularly intact    Assessment/Plan   Review of Radiographic Studies:    No new imaging done today.      Procedures     Diagnoses and all orders for this visit:    1. Chondromalacia of right knee (Primary)       Orthopedic activities reviewed and patient expressed appreciation      Recommendations/Plan:   Work/Activity Status: May perform usual activities as tolerated    Patient agreeable to call or return sooner for any concerns.         Reviewed with him indications for surgical treatment as well as natural history if left untreated    Impression:  Asymptomatic anterolateral meniscal tear right knee with mild chondromalacia  Plan:  Nonsurgical treatment observation return for further symptoms to consider arthroscopic treatment that point if indicated  "

## 2021-04-02 LAB
Lab: NORMAL
METHYLMALONATE SERPL-SCNC: 241 NMOL/L (ref 0–378)
VIT B1 BLD-SCNC: 104.1 NMOL/L (ref 66.5–200)

## 2021-04-07 RX ORDER — CHOLECALCIFEROL (VITAMIN D3) 1250 MCG
CAPSULE ORAL
Qty: 12 CAPSULE | OUTPATIENT
Start: 2021-04-07

## 2021-04-14 RX ORDER — URSODIOL 300 MG/1
CAPSULE ORAL
Qty: 180 CAPSULE | Refills: 1 | OUTPATIENT
Start: 2021-04-14

## 2021-11-24 ENCOUNTER — E-VISIT (OUTPATIENT)
Dept: FAMILY MEDICINE CLINIC | Facility: TELEHEALTH | Age: 36
End: 2021-11-24

## 2021-11-24 DIAGNOSIS — J06.9 VIRAL UPPER RESPIRATORY TRACT INFECTION: Primary | ICD-10-CM

## 2021-11-24 PROCEDURE — BRIGHTMDVISIT: Performed by: NURSE PRACTITIONER

## 2021-11-24 NOTE — EXTERNAL PATIENT INSTRUCTIONS
Note   You may feel you have not been exposed to COVID-19, but you are having mild symptoms. Many of the symptoms you get around allergy, cold and flu season may be mistaken for allergy or sinus symptoms, but for some These are the only symptoms they have with their COVID-19 illness. Testing will be the only way to know. Symptoms may worsen over the next several days. I have included a COVID-19 test. Go to your nearest [facility] for testing. Tell them you have already been seen virtually and only need testing We will notify you of your COVID-19 results by phone. You will receive a call from an unknown or blocked number. You can also get your results on your [employer] luisa. You will need to remain quarantined for 10 days from onset of symptoms and only to discontinue if symptoms have improved and no fever for 24 hours without the use of fever reducing medications such as Tylenol (acetaminophen), Advil (ibuprfen), or Aleve (naproxen). Continue to wear a mask for 14 days or until symptoms resolve. If symptoms worsen, go to Urgent Care or Emergency Department for care. Symptoms of Coronavirus are treated just as you would for any viral illness. Take Tylenol and/or Ibuprofen for pain and/or fever, you may find it better to alternate these every 4 hours, so that you are only taking each every 8 hours. Stay hydrated, rest and you may treat cough with over-the-counter cough syrup such as Robitussin. If your symptoms do not improve, symptoms change or do not fully resolve, seek further evaluation with your primary care provider or Urgent Care. If you develop severe symptoms, such as chest pain, high fever, difficulty breathing, difficulty urinating or have any symptoms that interfere with your ability to function go to the nearest Emergency Department immediately.   Diagnosis   Viral upper respiratory infection (URI)   My name is Caron Silverio, and I'm a healthcare provider at [facility]. I've  reviewed your interview and based on your responses, I see that you have a viral upper respiratory infection. However, the exact type of virus causing your illness isn't clear.   The start of cold and flu season, coupled with the ongoing COVID-19 pandemic, makes it hard to tell the difference between the common cold, the flu, or a COVID-19 infection. These illnesses share many of the same symptoms, including fever, fatigue, muscle and/or body aches, sore throat, runny/stuffy nose, and cough.   Most people with any of these illnesses have mild to moderate symptoms and can rest at home until they get better.   Everyone 5 years of age and older can now get a COVID-19 vaccination.   I encourage you to get the COVID-19 vaccine as soon as possible. COVID-19 vaccines are SAFE and EFFECTIVE. Getting vaccinated against COVID-19 is the best way to protect yourself, your loved ones, and your community.   Since December 2020, more than 357 million doses of the COVID-19 vaccine have been given in the [place]. Serious side effects are extremely rare, and no long-term side effects have been reported. If you still have questions or concerns about the COVID-19 vaccine, please speak with a healthcare provider.   For more information about how to get a COVID-19 vaccine, visit [facility]'s website. To find a COVID-19 vaccination site near you, call [phone], or text your zip code to 360449 ([place]). Message and data rates may apply.   I haven't prescribed any antibiotics. Antibiotics fight bacteria, not viruses. They don't help when you have a viral infection like the common cold, the flu, or a COVID-19 infection. Antibiotics could even make you feel worse as they can cause or worsen nausea, diarrhea, and stomach pain.    Stay home   While you're recovering, STAY HOME. Do not leave your home except to get medical care.   While at home, avoid close contact with others.   If possible, stay in a room away from other people  "in your home, and use a separate bathroom.   Wear a face mask when you can't avoid contact with other people.   If you can't wear a face mask because of breathing difficulty, your caregiver should wear a face mask.   Wearing a face mask does NOT mean you can leave your home. You must continue to stay home until you have recovered.   You can return to your normal activities when ALL of the following are true:    You've been fever-free for at least 24 hours (1 full day) without using fever-reducing medications such as Tylenol    Your symptoms have improved    It's been at least 10 days since your symptoms first started   Prevention    Cover your mouth and nose with a tissue when you cough or sneeze. Throw used tissues in a lined trash can right away, and wash your hands immediately after.    Avoid sharing personal items like dishes, utensils, towels, or bedding. Wash items thoroughly with soap and water after use.    Wash your hands often with soap and water for at least 20 seconds. If soap and water are not available, clean your hands with a hand  that contains at least 60% alcohol. Cover all surfaces of your hands and rub them together until they feel dry. Here's a simple recipe for homemade hand :    2 parts 95% non-denatured ethanol alcohol or 99% to 100% isopropyl alcohol    1 part moisturizer such as Aloe Vera or 100% glycerol    Avoid touching your face, especially their eyes, nose, and mouth.    Clean \"high-touch\" surfaces daily. \"High-touch\" surfaces include counters, tabletops, doorknobs, bathroom fixtures, toilets, phones, keyboards, tablets, and bedside tables. You can use soap, detergents, 60%-80% ethanol or isopropyl alcohol,  such as Windex, or bleach. All of these  are effective at killing the virus that causes COVID-19.    Limit contact with pets and other animals while sick. If you must care for your pet, wash your hands before and after you interact with them " and wear a face mask.   What to expect   Follow the advice in the treatment section below and you should feel better within 7 to 14 days. You may continue to feel tired and have a cough for several weeks.   Medications   Your pharmacy   [facility] [address] [place], [place] [id number] [phone]     Prescription      Mucus Relief ER oral tablet, extended release (600mg): Take 1 tablet by mouth every 12 hours as needed for cough and congestion.      Ipratropium bromide nasal spray (0.03%): Spray 2 sprays in each nostril 3 times a day as needed for nasal symptoms.   The medications recommended here can help ease your symptoms while your immune system fights the virus.   About your diagnosis   The common cold, the flu (influenza), and COVID-19 are respiratory illnesses that are caused by viruses. While the specific type of virus that causes these infections is different, the symptoms can often be similar. Fortunately, most people who get these infections have mild symptoms and can rest at home until they get better. For elderly people and those with chronic medical problems, these infections can be serious or life-threatening.   When to seek care   Call us at [phone]   with any sudden or unexpected symptoms.   Call your healthcare provider immediately if you have any of the following:    Fever over 103F    Fever that doesn't come down when you take Tylenol or ibuprofen    Fever that returns after being gone for more than 24 hours    Fever for more than 4 days    Worsening shortness of breath or difficulty breathing   Go to your nearest ER or call 911 if you have any of the following:    Shortness of breath that makes it difficult to do simple things like get dressed, bathe, or comb your hair    Persistent chest pain or chest tightness    New confusion or difficulty staying alert    Bluish color to the lips or face   Other treatment    Rest and drink plenty of sugar-free fluids. This is especially  important if you haven't urinated at least 3 times in the last 24 hours.    Use a clean humidifier or a cool-mist vaporizer in your room at night. Breathing humid air may help with nasal congestion.    Try using a Neti Pot to flush out your stuffy nose and sinuses. Neti Pots are available at any drugstore without a prescription.    Avoid smoke and air pollution. Smoke can make infections worse.    Coronavirus (COVID-19) information   Common symptoms of COVID-19 include fever, cough, shortness of breath, fatigue, muscle or body aches, headaches, new loss of sense of taste or smell, sore throat, stuffy or runny nose, nausea or vomiting, and diarrhea. Most people who get COVID-19 have mild symptoms and can rest at home until they get better. Elderly people and those with chronic medical problems may be at risk for more serious complications.   FAQs about the COVID-19 vaccine   There are three authorized COVID-19 vaccines: [employer]'s Natalia Vaccine (J&J/Gen110), Moderna, and Pfizer-BioNTech (Pfizer). The J&J/Natalia and Moderna vaccines are approved for use in people aged 18 and older. The Pfizer vaccine is approved for those aged 5 and older. All three are available at no cost. Even if you don't have health insurance, you can still get the COVID-19 vaccine for free.   Which vaccine is the best? Which vaccine should I get?   All three vaccines are highly effective. Even if you get COVID after being vaccinated, all of the vaccines help prevent severe disease, hospitalization, and complications.   Most people should get whichever vaccine is first available to them. However, women younger than 50 years old should consider the rare risk of blood clots with low platelets after vaccination with the J&J/Natalia vaccine. This risk hasn't been seen with the other two vaccines.   Are the vaccines safe?   Yes. Hundreds of millions of people in the [place] have already safely received COVID-19 vaccines. As part of Phase 3  clinical trials in the [place] and other countries, researchers collected safety and efficacy data for all three vaccines. These clinical trials follow strict standards. Before a vaccine is approved, the manufacturing company must submit data to the Food and Drug Administration (FDA) for review. Tens of thousands of volunteers participated in the clinical trials for the vaccines. The FDA continues to monitor safety data as the vaccines are given to the general population.   Do I need the vaccine if I've already had COVID?   Yes. Vaccination helps protect you even if you've already had COVID.   If you had COVID-19 and had symptoms, wait to get vaccinated until ALL of the following are true:    10 days since your symptoms started    24 hours with no fever, without the use of fever-reducing medications    Your other COVID-19 symptoms are improving   If you tested positive for COVID-19 but did not have symptoms, you can get vaccinated after 10 days have passed since you had a positive test, as long as you don't develop symptoms.   If you had COVID-19 and were treated with monoclonal antibodies, you should wait 90 days before getting a COVID-19 vaccination.   How many doses of the vaccine do I need?   J&J/Natalia: one dose.   Moderna: two doses, spaced 4 weeks apart.   Pfizer vaccine: two doses, spaced 3 weeks apart.   When am I considered fully vaccinated?   J&J/Natalia: 14 days after you get the shot.   Moderna: 14 days after your second dose.   Pfizer: 14 days after your second dose.   What if I miss the second dose of the Moderna or Pfizer vaccine?   Contact your healthcare provider to discuss your options. While one dose of the vaccine may provide some protection against COVID, you need both doses for maximum protection.   What are the common side effects of the vaccine?   A sore arm, tiredness, headache, and muscle pain may occur within two days of getting the vaccine and last a day or two. For the Moderna or Pfizer  "vaccines, side effects are more common after the second dose. People over the age of 55 are less likely to have side effects than younger people.   After I'm fully vaccinated, can I still get or spread COVID?   Yes, but your disease should be milder, and your risk of serious illness, hospitalization, and complications will be much lower. And being vaccinated reduces the risk of spreading the disease if you get it.   After I'm fully vaccinated, can I go back to normal?    You should still wear a mask indoors in public if:    It's required by laws, rules, regulations, or local guidance.    You have a weakened immune system.    Your age puts you at increased risk of severe disease.    You have a medical condition that puts you at increased risk of severe disease.    Someone in your household has a weakened immune system, is at increased risk for severe disease, or is unvaccinated.    You're in an area of high transmission.    For travel information, see the CDC's latest guidance  .    Even after you're fully vaccinated, you should still:    Get tested and stay away from others if you develop symptoms of COVID-19.    Stay home and away from other private or public settings if you've tested positive for COVID-19 in the previous 10 days.    Continue to follow any applicable laws, rules, and regulations.    If you're exposed to COVID-19 after being fully vaccinated, you should get tested 3 to 5 days after exposure, even if you don't have symptoms. Wear a mask indoors in public for 14 days following exposure, or until you've confirmed your test result is negative. If you test positive for COVID-19, you should isolate at home for 10 days.   I'm fully vaccinated but have heard about a \"third dose\" and \"fourth dose.\" Do these apply to me?   Third and fourth doses are needed for some immunocompromised people.   You should get a third dose if ALL of the following are true:    You have a moderately to severely weakened immune " "system    You've had two doses of the Moderna or Pfizer vaccine    It's been at least 28 days since your second dose   You should get a fourth dose if ALL of the following are true:    You have a moderately to severely weakened immune system    You've had three doses of the Moderna or Pfizer vaccine    It's been at least 6 months since your third dose   If any of these situations apply, you have a moderately to severely weakened immune system:    You're getting active cancer treatment for a cancer or tumor of the blood    You've had an organ transplant and are taking medicine to suppress your immune system    You've had a stem cell transplant within the last 2 years    You're taking medicine to suppress your immune system, such as high-dose corticosteroids    You have moderate to severe primary immunodeficiency (such as DiGeorge syndrome, Wiskott-Eze syndrome)    You have advanced or untreated HIV infection   If you think you need a third or fourth dose, speak with your care team.   I'm fully vaccinated but have heard about \"boosters.\" Do these apply to me?   A booster shot is a \"top-up\" for people whose immunity from vaccination may have lessened over time.   If you got the J&J/Natalia vaccine AND it's been at least 2 months since your shot, you should get a booster shot.   If you got the Pfizer or Moderna vaccine AND it's been at least 6 months since your second dose, you should get a booster shot if:    You're 65 or older    You're 18 or older and live in a long-term care facility    You're 18 or older and have an underlying medical condition, such as:    Cancer    Chronic kidney disease    Chronic lung disease (COPD, moderate to severe asthma, interstitial lung disease, cystic fibrosis, or pulmonary hypertension)    Dementia or other neurological condition    Diabetes    Down syndrome    Heart condition, including heart failure, coronary artery disease, cardiomyopathies, or hypertension    HIV infection    A " weakened immune system    Chronic liver disease    A mental health condition, including depression and schizophrenia spectrum disorders    Obesity    Pregnancy    Sickle cell disease or thalassemia    Smoking, current or former    Solid organ or blood stem cell transplant    Stroke or cerebrovascular disease    Substance use disorder    You're 18 or older and work or live in high-risk settings. This includes:    First responders (healthcare workers, firefighters, police)    Education staff (teachers, support staff,  workers)    Food and agriculture workers    Manufacturing workers    Corrections workers    [employer] workers    Public transit workers    Grocery store workers   If you need a booster shot, you can choose which vaccine to get. You can get the kind you originally received, or you can get a different kind. New CDC recommendations allow for mix-and-match dosing for booster shots. However, women younger than 50 years old should consider the rare risk of blood clots with low platelets after vaccination with the J&J/Natalia vaccine. This risk hasn't been seen with the other two vaccines.   If you think you need a booster shot, speak with your care team.    Flu vaccine information   Getting a flu vaccine this year is more important than ever. The vaccine not only protects you and the people around you from the flu, it also helps reduce the strain on healthcare systems responding to the COVID-19 pandemic.   Who should get a flu vaccine?   Everyone 6 months of age and older should get a yearly flu vaccine.   When should I get vaccinated?   You should get a flu vaccine by the end of October. Once you're vaccinated, it takes about two weeks for antibodies to develop and protect you against the flu. That's why it's important to get vaccinated as soon as possible.   After October, is it too late to get vaccinated?   No. You should still get vaccinated. As long as the flu viruses are still in your  community, flu vaccines will remain available, even into January of next year or later.   Why do I need a flu vaccine EVERY year?   Flu viruses are constantly changing, so flu vaccines are usually updated from one season to the next. Your protection from the flu vaccine also lessens over time.   Is the flu vaccine safe?   Yes. Over the last 50 years, hundreds of millions of Americans have safely received the flu vaccines.   What are the side effects of flu vaccines?   You CANNOT get the flu from a flu vaccine. Common side effects of the flu shot include soreness, redness and/or swelling where the shot was given, low grade fever, and aches. Common side effects of the nasal spray flu vaccine for adults include runny nose, headaches, sore throat, and cough. For children, side effects include wheezing, vomiting, muscle aches, and fever.   Does the flu vaccine increase your risk of getting COVID-19?   No. There is no evidence that getting a flu vaccine increases your risk of getting COVID-19.   Is it safe to get the flu vaccine along with a COVID-19 vaccine?   Yes. It's safe to get the flu vaccine with a COVID vaccine or booster.   Contact your healthcare provider TODAY for details on when and where to get your flu vaccine.   Your provider   Your diagnosis was provided by Caron Silverio, a member of your trusted care team at [facility].   If you have any questions, call us at [phone]  .

## 2021-11-24 NOTE — E-VISIT TREATED
Chief Complaint: Coronavirus (COVID-19), cold, sinus pain, allergy, or flu   Patient introduction   Patient is 36-year-old male who reports congestion, rhinitis, voice hoarseness, and fatigue that started 3-5 days ago.   Patient has not requested COVID testing.   Coronavirus Disease 2019 (COVID-19) exposure, testing history, and vaccination status:    No known exposure to a confirmed or suspected case of COVID-19.    No recent travel outside of their local community.    No history of COVID-19 testing.    Has not received a COVID-19 vaccination.   Warning. The following may warrant further investigation:    BMI of 30 to 39   When asked why they're seeking care online today, patient reports they just want to feel better.   Patient-submitted comments:   Patient writes: I have been congested and had post nasal drip. When I blow my nose it is mostly clear and thin but sometimes it is a whiteish yellow color that is thicker..   Patient did not request an excuse note.   General presentation   Symptoms came on suddenly. Patient denies urinating at least 3 times in the last 24 hours.   Fever:    Denies fever.   Sinus and nasal symptoms:    Reports rhinitis.    Reports yellow nasal drainage.    Nasal drainage is thin.    Reports postnasal drip.    Reports congestion with sinus pain or pressure on or around their eyes, nose, and cheeks.    Patient first noticed sinus pain < 5 days ago.    Sinus pain is not worse with Valsalva.    Denies itchy nose or sneezing.    Denies history of unhealed nasal septal ulcer/nasal wound.    Denies antibiotic treatment for sinus infection in the last year.    Denies history of deviated septum or nasal polyps.   Sore throat:    Reports mild hoarseness. Patient doesn't believe hoarseness is due to voice strain.    Denies sore throat.   Head and body aches:    Reports fatigue.    Denies headache.    Denies sweats.    Denies chills.    Denies myalgia.   Dizziness:    Denies dizziness.   Cough:     Denies cough.   Wheezing and SOB:    Reports previous albuterol inhaler use during URIs, bronchitis, or pneumonia.    Reports previous steroid inhaler use during URIs, bronchitis, or pneumonia.    Not using an albuterol inhaler for current symptoms because they don't have any available.    Denies COPD diagnosis.    Denies asthma diagnosis.    Denies wheezing.    Denies shortness of breath.   Chest pain:    Denies chest pain.   Allergies:    Patient thinks symptoms are allergy-related.   Flu exposure:    Denies recent exposure to confirmed flu diagnosis.    Denies receiving a flu vaccine this season.   Patient denies the following red flags:    Changes in alertness or awareness   Self-exam:    No difficulty moving their chin toward their chest    Neck lymph nodes feel normal   Denies antibiotic treatment for similar symptoms within the past month.   Current medications   Reports taking over-the-counter medication for current symptoms. Patient has taken cetirizine, guaifenesin, and ibuprofen.   Denies taking other medications or supplements.   Medication allergies   None.   Medication contraindication review   Denies history of anaphylactic reaction to beta-lactam antibiotics; aspirin triad; blood dyscrasia; bone marrow depression; catecholamine-releasing paraganglioma; coronary artery disease; coagulation disorder; congenital long QT syndrome; depression; electrolyte abnormalities; fungal infection; GI bleeding; GI obstruction; G6PD deficiency; heart arrhythmia; hypertension; kidney disease or hemodialysis; mononucleosis; myasthenia; recent myocardial infarction; NSAID-induced asthma/urticaria; Parkinson's disease; pheochromocytoma; porphyria; Reye syndrome; seizure disorder; ulcerative colitis; and urinary retention.   Denies history of metoclopramide-associated dystonic reaction and tardive dyskinesia.   No known history of amoxicillin-clavulanate-associated cholestatic jaundice or hepatic impairment.   No known  history of azithromycin-associated cholestatic jaundice or hepatic impairment.   Past medical history   Immune conditions: Denies immunocompromising conditions. Denies history of cancer.   Social history   Non-smoker.   Assessment   Viral URI, cannot rule out influenza or COVID-19. Ruled out: Traumatic laryngitis.   [facility] required information for COVID-19 lab data reporting (if test is ordered):   Symptoms:    Fatigue    Nasal congestion    Rhinitis   Symptom onset: 3-5 days ago ago    Healthcare worker? No  Resident in a congregate care setting? No  Previous history of COVID-19 testing: None     Plan   Medications:    Mucus Relief  mg tablet, extended release RX 600mg 1 tab PO q12h PRN 7d for cough and congestion. Amount is 14 tab.    ipratropium bromide 42 mcg (0.06 %) nasal spray RX 0.06% 2 sprays nasal tid PRN 4d for nasal symptoms. Amount is 15 mL.   The patient's prescriptions will be sent to:   [id number]   [address] [place] [address]   Phone: [phone]     Fax: [phone]   Education:    Condition and causes    Prevention    Treatment and self-care    When to call provider      ----------   Electronically signed by DOMONIQUE Gonzales on 2021-11-24 at 02:22AM   ----------   Patient Interview Transcript:   Why are you getting care through eVisit today? We can't guarantee a specific treatment or test. Your provider will decide what's best for you. You'll have a chance to tell us more at the end of the interview. Select all that apply.    I just want to feel better!   Not selected:    I want a specific treatment or medication    I want to know if I have a cold or something more serious    I want to know if I need to be seen by a provider    I need a doctor's note    I want to be tested for COVID-19    I want to get the COVID-19 vaccine    I think I'm having side effects from the COVID-19 vaccine    None of the above   Which of these symptoms are bothering you? Select all that  apply.    Stuffed-up nose or sinuses    Runny nose    Hoarse voice or loss of voice    Fatigue or tiredness   Not selected:    Cough    Shortness of breath    Fever    Itchy or watery eyes    Itchy nose or sneezing    Loss of smell or taste    Sore throat    Headache    Sweats    Chills    Muscle or body aches    Nausea or vomiting    Diarrhea    I don't have any of these symptoms   Before we learn more about why you're here, we'll get some information related to COVID-19. We'll ask about risk factors, testing, vaccination status, and exposure. Do you have any of these conditions? If so, you may be at increased risk for complications from COVID-19. Select all that apply.    None of the above   Not selected:    Chronic lung disease, such as cystic fibrosis or interstitial fibrosis    Heart disease, such as congenital heart disease, congestive heart failure, or coronary artery disease    Disorder of the brain, spinal cord, or nerves and muscles, such as dementia, cerebral palsy, epilepsy, muscular dystrophy, or developmental delay    Metabolic disorder or mitochondrial disease    Cerebrovascular disease, such as stroke or another condition affecting the blood vessels or blood supply to the brain   Do you live in a group care setting? Examples include: - Nursing home - Residential care - Psychiatric treatment facility - [facility] - [facility] - Board and care home - Homeless shelter - Foster care setting Select one.    No   Not selected:    Yes   Have you ever been tested for COVID-19? Select one.    No   Not selected:    Yes   Have you gotten the COVID-19 vaccine? Select one.    No   Not selected:    Yes   In the last 14 days, have you traveled outside of your local community? This includes travel by car, RV, bus, train, or plane. Travel increases your chances of getting and spreading COVID-19. Select one.    No   Not selected:    Yes   In the last 14 days, have you had close contact with someone who has coronavirus  "(COVID-19)? \"Close contact\" means any of these: - Living in the same household as someone with COVID-19. - Caring for someone with COVID-19. - Being within 6 feet of someone with COVID-19 for a total of at least 15 minutes over a 24-hour period. For example, three 5-minute exposures for a total of 15 minutes. - Being in direct contact with respiratory droplets from someone with COVID-19 (being coughed on, kissing, sharing utensils). Select one.    No, not that I know of   Not selected:    Yes, a confirmed case    Yes, a suspected case   Thanks for completing our COVID-19 questions. Now we'll return to your symptoms. When did your symptoms start? If you know the exact date your symptoms started, choose Other and enter the month and day. Select one.    3 to 5 days ago   Not selected:    Less than 48 hours ago    6 to 9 days ago    10 to 14 days ago    2 to 4 weeks ago    More than a month ago    Other (specify)   Did your symptoms come on suddenly or gradually? Select one.    Suddenly   Not selected:    Gradually    I'm not sure   You mentioned having a stuffy nose or sinus congestion. Do you feel pain or pressure in your sinuses?    Yes   Not selected:    No    I'm not sure   Where do you feel sinus pain or pressure?    Around my eyes    Behind my nose    In my cheeks   Not selected:    In my forehead    In my upper teeth or jaw    I'm not sure   When did you first notice your sinus pain or pressure? Select one.    Less than 5 days ago   Not selected:    5 to 9 days ago    10 to 14 days ago    2 to 4 weeks ago    1 month ago or longer   Does coughing, sneezing, or leaning forward make your sinuses feel worse? Select one.    No   Not selected:    Yes    I'm not sure   What color is your nasal drainage? Select one.    Yellow   Not selected:    Clear    White    Green    My nose is stuffed but not draining or running    I'm not sure   Is your nasal drainage thick or thin? Select one.    Thin   Not selected:    Thick   " " I'm not sure   Is there any drainage (mucus) going down the back of your throat? This kind of drainage is also called \"postnasal drip.\" Select one.    Yes   Not selected:    No    I'm not sure   Since your symptoms started, have you felt dizzy? Select one.    No   Not selected:    Yes, but I can continue with my regular daily activities    Yes, and it makes it hard to stand, walk, or do daily activities   Do you have chest pain? You might also feel it as discomfort, aching, tightness, or squeezing in the chest. Select one.    No   Not selected:    Yes   Have you urinated at least 3 times in the last 24 hours? Select one.    No   Not selected:    Yes    I'm not sure   Changes in alertness or awareness may mean you need emergency care. Since your symptoms started, have you had any of these? Select all that apply.    None of the above   Not selected:    Confusion    Slurred speech    Not knowing where you are or what day it is    Difficulty staying conscious    Fainting or passing out   Do your symptoms include a whistling sound, or wheezing, when you breathe? Select one.    No   Not selected:    Yes    I'm not sure   Early in this interview, you told us you were hoarse or you'd lost your voice. How would you describe the changes to your voice? Select one.    It just sounds a little raspy   Not selected:    It's harder than usual to talk    I can barely talk at all   Is it possible that you strained your voice? Singing, yelling, or talking more or louder than usual can cause voice strain. Select one.    No   Not selected:    Yes    I'm not sure   Do you have any of these symptoms in your ear(s)? Select all that apply.    None of the above   Not selected:    Pain    Pressure    Fullness    Crackling or popping    Plugged or blocked sensation   Can you move your chin toward your chest?    Yes   Not selected:    No, my neck is too stiff   Are your glands/lymph nodes swollen, or does it hurt when you touch them?    No   " Not selected:    Yes    I'm not sure   People with a very high body mass index (BMI) are at higher risk for developing complications from the flu and severe illness from COVID-19. To determine your BMI, we need to know your weight and height. Please enter your weight (in pounds).    Weight   Please enter your height.    Height   In the past week, has anyone around you (such as at school, work, or home) had a confirmed diagnosis of the flu? A confirmed diagnosis means that a nose swab was done to verify a flu infection. Select all that apply.    No   Not selected:    I live with someone who has the flu    I've been within touching distance of someone who has the flu    I've walked by, or sat about 3 feet away from, someone who has the flu    I've been in the same building as someone who has the flu    I'm not sure   Have you ever been diagnosed with asthma? Select one.    No   Not selected:    Yes   Have you ever been prescribed albuterol to use for wheezing, cough, or shortness of breath caused by a cold, bronchitis, or pneumonia? Albuterol (ProAir, Proventil, Ventolin) is prescribed as an inhaler or a solution to be used with a nebulizer machine. Select one.    Yes   Not selected:    No    I'm not sure   Have you ever been prescribed a steroid inhaler to use for wheezing, cough, or shortness of breath caused by a cold, bronchitis, or pneumonia? Some examples of steroid inhalers include Pulmicort, Flovent, Qvar, and Alvesco. Select one.    Yes   Not selected:    No    I'm not sure   Have you used albuterol for your current symptoms? This includes both albuterol inhalers and albuterol solution in a nebulizer machine. Select one.    No, I don't have any, or it's    Not selected:    Yes    No, I don't feel like I need it   Would you like a prescription for albuterol? Select one.    No   Not selected:    Yes   Have you ever been diagnosed with chronic obstructive pulmonary disease (COPD)? Select one.    No   Not  selected:    Yes    I'm not sure   In the last year, how many times were you treated with antibiotics for a sinus infection? Select one.    None   Not selected:    1 to 3 times    4 or more times   Have you been diagnosed with a deviated septum or nasal polyps? The nose is divided into two nostrils by the septum. A crooked septum is called a deviated septum. Nasal polyps are growths inside the nose or sinuses. Select one.    No   Not selected:    Yes, but I had surgery to treat them    Yes, I have a deviated septum    Yes, I have nasal polyps    Yes, I have a deviated septum and nasal polyps    I'm not sure   Do you have a sore inside your nose that won't heal? Select one.    No   Not selected:    Yes    I'm not sure   Do you have allergies (pollen, dust mites, mold, animal dander)? Select one.    I'm not sure   Not selected:    Yes    No   Do you think your symptoms could be allergy-related? Select one.    Yes   Not selected:    No    I'm not sure   Have you had a flu shot this season? Select one.    No   Not selected:    Yes, less than 2 weeks ago    Yes, 2 to 4 weeks ago    Yes, 1 to 3 months ago    Yes, 3 to 6 months ago    Yes, more than 6 months ago    I'm not sure   The flu and COVID-19 can be more serious for people with certain conditions or characteristics. These questions help us figure out if you or anyone you live with is at higher risk for complications from these infections. Do either of these statements apply to you? Select all that apply.    None of the above   Not selected:    I'm  or Native [place]    I'm a healthcare worker   Do you smoke tobacco? Select one.    No, never   Not selected:    Yes, every day    Yes, some days    No, I quit   Some conditions can put you at risk for more serious infections. Do any of these apply to you? Select all that apply.    None of the above   Not selected:    I've been hospitalized within the last 5 days    I have diabetes    I'm in close contact  with a child in    Are you currently being treated for any of these conditions? Scroll to see all options. Select all that apply.    None of the above   Not selected:    Aspirin triad (also known as Samter's triad or ASA triad)    Asthma or hives from taking aspirin or other NSAIDs, such as ibuprofen or naproxen    Blockage or narrowing of the blood vessels of the heart    Blood dyscrasia, such anemia, leukemia, lymphoma, or myeloma    Bone marrow depression    Catecholamine-releasing paraganglioma    Blood clotting disorder    Congenital long QT syndrome    Depression    Difficulty urinating or completely emptying your bladder    Uncorrected electrolyte abnormalities    Fungal infection    Gastrointestinal (GI) bleeding    Gastrointestinal (GI) obstruction    G6PD deficiency    Recent heart attack    High blood pressure    Irregular heartbeat or heart rhythm    Kidney disease or hemodialysis    Mononucleosis (mono)    Myasthenia gravis    Parkinson's disease    Pheochromocytoma    Reye syndrome    Seizure disorder    Ulcerative colitis   Do you have any of these conditions that can affect the immune system? Scroll to see all options. Select all that apply.    None of these   Not selected:    History of bone marrow transplant    Chronic kidney disease    Chronic liver disease (including cirrhosis)    HIV/AIDS    Inflammatory bowel disease (Crohn's disease or ulcerative colitis)    Lupus    Moderate to severe plaque psoriasis    Multiple sclerosis    Rheumatoid arthritis    Sickle cell anemia    Alpha or beta thalassemia    History of solid organ transplant (kidney, liver, or heart)    History of spleen removal    An autoimmune disorder not listed here    A condition requiring treatment with long-term use of oral steroids (such as prednisone, prednisolone, or dexamethasone)   Have you ever been diagnosed with cancer? Select one.    No   Not selected:    Yes, I have cancer now    Yes, but I'm in remission    Have you ever had either of these conditions? Select all that apply.    No   Not selected:    Metoclopramide-associated dystonic reaction    Tardive dyskinesia   Do any of these apply to the people who live with you? Select all that apply.    None of the above   Not selected:    A child under the age of 5    An adult 65 or older    A person who is pregnant    A person who has given birth, had a miscarriage, had a pregnancy loss, or had an  in the last 2 weeks    An  or Native [place]   Does any member of your household have any of these medical conditions? Select all that apply.    None of the above   Not selected:    Asthma    Disorders of the brain, spinal cord, or nerves and muscles, such as dementia, cerebral palsy, epilepsy, muscular dystrophy, or developmental delay    Chronic lung disease, such as COPD or cystic fibrosis    Heart disease, such as congenital heart disease, congestive heart failure, or coronary artery disease    Cerebrovascular disease, such as stroke or another condition affecting the blood vessels or blood supply to the brain    Blood disorders, such as sickle cell disease    Diabetes    Metabolic disorders such as inherited metabolic disorders or mitochondrial disease    Kidney disorders    Liver disorders    Weakened immune system due to illness or medications such as chemotherapy or steroids    Children under the age of 19 who are on long-term aspirin therapy    Extreme obesity (BMI > 40)   Just a few more questions about medications, and then you're finished. Have you used any non-prescription medications or nasal sprays for your current symptoms? Examples include saline sprays, decongestants, NyQuil, and Tylenol. Select one.    Yes   Not selected:    No   Which of these non-prescription medications have you tried? Scroll to see all options. Select all that apply.    Cetirizine (Zyrtec)    Guaifenesin (Mucinex)    Ibuprofen (Advil, Motrin, Midol)   Not selected:     Acetaminophen (Tylenol)    Budesonide (Rhinocort)    Chlorpheniramine (Aller-chlor, Chlor-Trimeton)    Cromolyn (NasalCrom)    Dextromethorphan (Delsym, Robitussin, Vicks DayQuil Cough)    Diphenhydramine (Benadryl)    Fexofenadine (Allegra)    Fluticasone (Flonase)    Guaifenesin/dextromethorphan (Delsym DM, Mucinex DM, Robitussin DM)    Ketotifen (Alaway, Zaditor)    Loratadine (Alavert, Claritin)    Naphazoline-pheniramine (Naphcon-A, Opcon-A, Visine-A)    Omeprazole (Prilosec)    Oxymetazoline (Afrin)    Phenylephrine (Sudafed)    Triamcinolone (Nasacort)    None of the above   In the past month, have you taken antibiotics for similar symptoms? Examples of antibiotics include amoxicillin, amoxicillin-clavulanate (Augmentin), penicillin, cefdinir (Omnicef), doxycycline, and clindamycin (Cleocin). Select one.    No   Not selected:    Yes    I'm not sure   Have you taken any monoamine oxidase inhibitor (MAOI) medications in the last 14 days? Examples include rasagiline (Azilect), selegiline (Eldepryl, Zelapar), isocarboxazid (Marplan), phenelzine (Nardil), and tranylcypromine (Parnate). Select one.    No   Not selected:    Yes    I'm not sure   Do you take Kynmobi or Apokyn (apomorphine)? Select one.    I'm not sure   Not selected:    Yes    No   Are you taking any other medications or supplements? On the next screen, you need to list all vitamins, supplements, non-prescription medications (such as aspirin or Aleve), and prescription medications that you're taking. Select one.    No   Not selected:    Yes    Yes, but I'm not sure what they are   Have you ever had an allergic or bad reaction to any medication? Select one.    No   Not selected:    Yes   Are you allergic to milk or to the proteins found in milk (for example, whey or casein)? A milk allergy is different from lactose intolerance. Select one.    No   Not selected:    Yes    I'm not sure   Have you ever had jaundice or liver problems as a result of  taking amoxicillin-clavulanate (Augmentin)? Jaundice is a condition in which the skin and the whites of the eyes turn yellow. Select all that apply.    No, not that I know of   Not selected:    Yes, jaundice    Yes, liver problems   Have you ever had jaundice or liver problems as a result of taking azithromycin (Zithromax, Zmax)? Jaundice is a condition in which the skin and the whites of the eyes turn yellow. Select all that apply.    No, not that I know of   Not selected:    Yes, jaundice    Yes, liver problems   Do you need a doctor's note? A doctor's note confirms that you received care today and states when you can return to school or work. It does not contain information about your diagnosis or treatment plan. Your provider will make the final decision on whether to give you a doctor's note and for how long. Doctor's notes CANNOT be backdated. We can't provide medical leave paperwork through this type of visit. If more paperwork is needed to request time off, contact your primary care provider. Select one.    No   Not selected:    Today only (1 day)    Today and tomorrow (2 days)    3 days    7 days    10 days    14 days   Is there anything else you'd like to tell us about your symptoms?    I have been congested and had post nasal drip. When I blow my nose it is mostly clear and thin but sometimes it is a whiteish yellow color that is thicker.   ----------   Medical history   The following information was received from the EMR on November 24, 2021.   Allergies:    No Known Allergies   Medications:    - Route: Oral   - Start Date: March 15, 2021   - End Date: None   - Status: active    - Route:   - Start Date: February 23, 2021   - End Date: None   - Status: active    - Route:   - Start Date: March 26, 2021   - End Date: None   - Status: active   Problem list:    Class 2 obesity without serious comorbidity in adult   - Category: Problem List Item   - Health Status:   - Start Date: May 28, 2020   - End Date:  None   - Status: active    Fatigue   - Category: Problem List Item   - Health Status:   - Start Date: May 28, 2020   - End Date: October 30, 2020   - Status: resolved    Dyspepsia   - Category: Problem List Item   - Health Status:   - Start Date: May 28, 2020   - End Date: None   - Status: active    Dyspnea on exertion   - Category: Problem List Item   - Health Status:   - Start Date: May 28, 2020   - End Date: October 30, 2020   - Status: resolved    Well adult exam   - Category: Problem List Item   - Health Status:   - Start Date: October 30, 2020   - End Date: None   - Status: active    Abnormal EKG   - Category: Problem List Item   - Health Status:   - Start Date: October 30, 2020   - End Date: October 30, 2020   - Status: resolved    Morbid obesity with body mass index (BMI) of 40.0 to 44.9 in adult (HCC)   - Category: Problem List Item   - Health Status:   - Start Date: November 17, 2020   - End Date: None   - Status: inactive

## 2022-04-01 ENCOUNTER — OFFICE VISIT (OUTPATIENT)
Dept: INTERNAL MEDICINE | Facility: CLINIC | Age: 37
End: 2022-04-01

## 2022-04-01 VITALS
DIASTOLIC BLOOD PRESSURE: 90 MMHG | HEIGHT: 70 IN | BODY MASS INDEX: 34.36 KG/M2 | TEMPERATURE: 97.3 F | HEART RATE: 88 BPM | OXYGEN SATURATION: 98 % | WEIGHT: 240 LBS | SYSTOLIC BLOOD PRESSURE: 150 MMHG

## 2022-04-01 DIAGNOSIS — Z13.220 LIPID SCREENING: ICD-10-CM

## 2022-04-01 DIAGNOSIS — Z00.00 WELL ADULT EXAM: Primary | ICD-10-CM

## 2022-04-01 DIAGNOSIS — M25.50 ARTHRALGIA, UNSPECIFIED JOINT: ICD-10-CM

## 2022-04-01 DIAGNOSIS — R03.0 ELEVATED BP WITHOUT DIAGNOSIS OF HYPERTENSION: ICD-10-CM

## 2022-04-01 DIAGNOSIS — Z13.29 SCREENING FOR ENDOCRINE DISORDER: ICD-10-CM

## 2022-04-01 DIAGNOSIS — Z13.0 SCREENING FOR BLOOD DISEASE: ICD-10-CM

## 2022-04-01 PROCEDURE — 99395 PREV VISIT EST AGE 18-39: CPT | Performed by: FAMILY MEDICINE

## 2022-04-01 NOTE — PROGRESS NOTES
Alen Rosales is a 37 y.o. male.    Chief Complaint   Patient presents with   • Annual Exam       HPI   Patient presents today for an annual physical exam.  Trying to eat healthy.  Still cannot eat much after bariatric surgery.  He does exercise regularly.  Has regular dental exams.  Denies trouble vision; has not had an eye exam in a while. Declines COVID vaccine.  Up to date on tdap.  Denies any concern for anxiety or depression.     Patient's mother has arthritis.  He has various arthralgias (tenderness to right knuckle on right hand, right knee pain). He does have a h/o a meniscal injury to the right knee.      Patient's BP is elevated in office today.  He does drink a lot of caffeine (3-4 cups of coffee and 3 sodas a day). Also consumes high sodium diet.     The following portions of the patient's history were reviewed and updated as appropriate: allergies, current medications, past family history, past medical history, past social history, past surgical history and problem list.     Past Medical History:   Diagnosis Date   • Asthma     AS CHILD   • Dyspepsia    • Dyspnea on exertion    • Elevated LDL cholesterol level    • Fatigue    • Morbid obesity (HCC)        Past Surgical History:   Procedure Laterality Date   • DENTAL PROCEDURE      tooth pulled    • ENDOSCOPY N/A 12/11/2020    Procedure: ESOPHAGOGASTRODUODENOSCOPY;  Surgeon: Vince Jean MD;  [facility];  Service: Bariatric;  Laterality: N/A;   • GASTRIC SLEEVE LAPAROSCOPIC N/A 12/11/2020    Procedure: GASTRIC SLEEVE LAPAROSCOPIC;  Surgeon: Vince Jean MD;  Location:  [facility];  Service: Bariatric;  Laterality: N/A;   • HIATAL HERNIA REPAIR N/A 12/11/2020    Procedure: HIATAL HERNIA REPAIR LAPAROSCOPIC;  Surgeon: Vince Jean MD;  Location:  [facility];  Service: Bariatric;  Laterality: N/A;       Family History   Problem Relation Age of Onset   • Hypertension Mother    • Diabetes Mother    • Obesity Mother    • Sleep  apnea Mother    • Coronary artery disease Father    • Hyperlipidemia Father    • Hypertension Father    • Heart attack Father    • Heart disease Father    • Drug abuse Brother    • Hypertension Brother    • COPD Maternal Grandmother    • Obesity Maternal Grandmother    • Diabetes Maternal Grandmother    • Hypertension Maternal Grandmother    • Heart disease Maternal Grandmother    • Heart attack Maternal Grandmother    • Kidney cancer Maternal Grandmother    • Cancer Maternal Grandmother         Bladder Cancer   • Hypertension Maternal Grandfather    • Stroke Maternal Grandfather    • Stroke Paternal Grandmother    • Hypertension Paternal Grandmother    • Stroke Paternal Grandfather    • Hypertension Paternal Grandfather    • Diabetes Paternal Grandfather        Social History     Socioeconomic History   • Marital status:    Tobacco Use   • Smoking status: Never Smoker   • Smokeless tobacco: Never Used   Vaping Use   • Vaping Use: Never used   Substance and Sexual Activity   • Alcohol use: Not Currently     Alcohol/week: 1.0 standard drink     Types: 1 Shots of liquor per week     Comment: socially   • Drug use: Never   • Sexual activity: Yes     Partners: Female       No Known Allergies      Current Outpatient Medications:   •  Cholecalciferol (Vitamin D3) 1.25 MG (99392 UT) capsule, TAKE 1 TABLET BY MOUTH 1 TIME PER WEEK FOR 12 DOSES., Disp: , Rfl:   •  Unable to find, 1 each 1 (One) Time. Vitamin Patch, Disp: , Rfl:   •  ursodiol (ACTIGALL) 300 MG capsule, Take 300 mg by mouth 2 (Two) Times a Day., Disp: , Rfl:     ROS    Review of Systems   Constitutional: Negative for chills, fatigue and fever.   HENT: Negative for congestion, postnasal drip and sore throat.    Eyes: Negative for visual disturbance.   Respiratory: Negative for cough and shortness of breath.    Cardiovascular: Negative for chest pain.   Gastrointestinal: Negative for abdominal pain, constipation, diarrhea, nausea and vomiting.  "  Endocrine: Negative for cold intolerance and heat intolerance.   Genitourinary: Negative for difficulty urinating.   Musculoskeletal: Positive for arthralgias.   Skin: Negative for rash.   Neurological: Negative for weakness, numbness and headache.   Hematological: Does not bruise/bleed easily.   Psychiatric/Behavioral: Negative for depressed mood. The patient is not nervous/anxious.        Vitals:    04/01/22 1304   BP: 150/90   Pulse: 88   Temp: 97.3 °F (36.3 °C)   SpO2: 98%   Weight: 109 kg (240 lb)   Height: 177.8 cm (70\")     Body mass index is 34.44 kg/m².    Physical Exam     Physical Exam  Constitutional:       General: He is not in acute distress.     Appearance: Normal appearance. He is well-developed.   HENT:      Head: Normocephalic and atraumatic.      Right Ear: Tympanic membrane and external ear normal.      Left Ear: Tympanic membrane and external ear normal.   Eyes:      Extraocular Movements: Extraocular movements intact.      Conjunctiva/sclera: Conjunctivae normal.      Pupils: Pupils are equal, round, and reactive to light.   Cardiovascular:      Rate and Rhythm: Normal rate and regular rhythm.      Heart sounds: No murmur heard.  Pulmonary:      Effort: Pulmonary effort is normal. No respiratory distress.      Breath sounds: Normal breath sounds. No wheezing.   Abdominal:      General: Bowel sounds are normal. There is no distension.      Palpations: Abdomen is soft.      Tenderness: There is no abdominal tenderness.   Musculoskeletal:      Cervical back: Normal range of motion and neck supple.      Right lower leg: No edema.      Left lower leg: No edema.   Lymphadenopathy:      Cervical: No cervical adenopathy.   Skin:     General: Skin is warm and dry.   Neurological:      Mental Status: He is alert and oriented to person, place, and time.      Cranial Nerves: No cranial nerve deficit.      Deep Tendon Reflexes: Reflexes normal.   Psychiatric:         Mood and Affect: Mood normal.         " Behavior: Behavior normal.         Assessment/Plan    Problems Addressed this Visit     Well adult exam - Primary     Normal PE findings.  Discussed with patient routine health maintenance including:  Vaccines, dental/eye health, healthy diet and exercise.  Mental health addressed today as well.  Will obtain routine labs.             Elevated BP without diagnosis of hypertension     Advised to monitor BP at home.  Decreased caffeine and sodium consumption.              Other Visit Diagnoses     Arthralgia, unspecified joint        Relevant Orders    JACQUELIN With / DsDNA, RNP, Sjogrens A / B, Smith    Cyclic Citrul Peptide Antibody, IgG / IgA    Rheumatoid Factor    Uric Acid    Screening for blood disease        Relevant Orders    CBC & Differential    Screening for endocrine disorder        Relevant Orders    Comprehensive Metabolic Panel    Lipid screening        Relevant Orders    Lipid Panel       No orders of the defined types were placed in this encounter.      No orders of the defined types were placed in this encounter.      Return in about 1 month (around 5/1/2022) for HTN.      Dian Leonard, DO

## 2022-04-01 NOTE — ASSESSMENT & PLAN NOTE
Normal PE findings.  Discussed with patient routine health maintenance including:  Vaccines, dental/eye health, healthy diet and exercise.  Mental health addressed today as well.  Will obtain routine labs.

## 2022-04-03 LAB
ALBUMIN SERPL-MCNC: 4.6 G/DL (ref 3.5–5.2)
ALBUMIN/GLOB SERPL: 2.2 G/DL
ALP SERPL-CCNC: 61 U/L (ref 39–117)
ALT SERPL-CCNC: 15 U/L (ref 1–41)
ANA SER QL: NEGATIVE
AST SERPL-CCNC: 13 U/L (ref 1–40)
BASOPHILS # BLD AUTO: 0.05 10*3/MM3 (ref 0–0.2)
BASOPHILS NFR BLD AUTO: 0.5 % (ref 0–1.5)
BILIRUB SERPL-MCNC: 0.5 MG/DL (ref 0–1.2)
BUN SERPL-MCNC: 17 MG/DL (ref 6–20)
BUN/CREAT SERPL: 17.5 (ref 7–25)
CALCIUM SERPL-MCNC: 9.7 MG/DL (ref 8.6–10.5)
CCP IGA+IGG SERPL IA-ACNC: 6 UNITS (ref 0–19)
CHLORIDE SERPL-SCNC: 104 MMOL/L (ref 98–107)
CHOLEST SERPL-MCNC: 190 MG/DL (ref 0–200)
CO2 SERPL-SCNC: 29 MMOL/L (ref 22–29)
CREAT SERPL-MCNC: 0.97 MG/DL (ref 0.76–1.27)
EGFRCR SERPLBLD CKD-EPI 2021: 103.8 ML/MIN/1.73
EOSINOPHIL # BLD AUTO: 0.2 10*3/MM3 (ref 0–0.4)
EOSINOPHIL NFR BLD AUTO: 2 % (ref 0.3–6.2)
ERYTHROCYTE [DISTWIDTH] IN BLOOD BY AUTOMATED COUNT: 12.5 % (ref 12.3–15.4)
GLOBULIN SER CALC-MCNC: 2.1 GM/DL
GLUCOSE SERPL-MCNC: 86 MG/DL (ref 65–99)
HCT VFR BLD AUTO: 45.2 % (ref 37.5–51)
HDLC SERPL-MCNC: 53 MG/DL (ref 40–60)
HGB BLD-MCNC: 14.6 G/DL (ref 13–17.7)
IMM GRANULOCYTES # BLD AUTO: 0.02 10*3/MM3 (ref 0–0.05)
IMM GRANULOCYTES NFR BLD AUTO: 0.2 % (ref 0–0.5)
LDLC SERPL CALC-MCNC: 115 MG/DL (ref 0–100)
LYMPHOCYTES # BLD AUTO: 3.38 10*3/MM3 (ref 0.7–3.1)
LYMPHOCYTES NFR BLD AUTO: 34.5 % (ref 19.6–45.3)
MCH RBC QN AUTO: 31.1 PG (ref 26.6–33)
MCHC RBC AUTO-ENTMCNC: 32.3 G/DL (ref 31.5–35.7)
MCV RBC AUTO: 96.4 FL (ref 79–97)
MONOCYTES # BLD AUTO: 0.86 10*3/MM3 (ref 0.1–0.9)
MONOCYTES NFR BLD AUTO: 8.8 % (ref 5–12)
NEUTROPHILS # BLD AUTO: 5.29 10*3/MM3 (ref 1.7–7)
NEUTROPHILS NFR BLD AUTO: 54 % (ref 42.7–76)
NRBC BLD AUTO-RTO: 0 /100 WBC (ref 0–0.2)
PLATELET # BLD AUTO: 256 10*3/MM3 (ref 140–450)
POTASSIUM SERPL-SCNC: 4.6 MMOL/L (ref 3.5–5.2)
PROT SERPL-MCNC: 6.7 G/DL (ref 6–8.5)
RBC # BLD AUTO: 4.69 10*6/MM3 (ref 4.14–5.8)
RHEUMATOID FACT SERPL-ACNC: <10 IU/ML
SODIUM SERPL-SCNC: 144 MMOL/L (ref 136–145)
TRIGL SERPL-MCNC: 122 MG/DL (ref 0–150)
URATE SERPL-MCNC: 5.7 MG/DL (ref 3.4–7)
VLDLC SERPL CALC-MCNC: 22 MG/DL (ref 5–40)
WBC # BLD AUTO: 9.8 10*3/MM3 (ref 3.4–10.8)

## 2022-05-06 ENCOUNTER — TELEPHONE (OUTPATIENT)
Dept: INTERNAL MEDICINE | Facility: CLINIC | Age: 37
End: 2022-05-06

## 2022-05-06 ENCOUNTER — OFFICE VISIT (OUTPATIENT)
Dept: INTERNAL MEDICINE | Facility: CLINIC | Age: 37
End: 2022-05-06

## 2022-05-06 VITALS
HEIGHT: 70 IN | DIASTOLIC BLOOD PRESSURE: 88 MMHG | WEIGHT: 237.6 LBS | SYSTOLIC BLOOD PRESSURE: 136 MMHG | BODY MASS INDEX: 34.01 KG/M2 | HEART RATE: 68 BPM | OXYGEN SATURATION: 99 % | TEMPERATURE: 97.2 F

## 2022-05-06 DIAGNOSIS — R03.0 ELEVATED BP WITHOUT DIAGNOSIS OF HYPERTENSION: Primary | ICD-10-CM

## 2022-05-06 DIAGNOSIS — F41.8 SITUATIONAL ANXIETY: ICD-10-CM

## 2022-05-06 PROCEDURE — 99213 OFFICE O/P EST LOW 20 MIN: CPT | Performed by: FAMILY MEDICINE

## 2022-05-06 RX ORDER — PROPRANOLOL HYDROCHLORIDE 10 MG/1
10 TABLET ORAL 3 TIMES DAILY PRN
Qty: 90 TABLET | Refills: 0 | Status: SHIPPED | OUTPATIENT
Start: 2022-05-06 | End: 2022-05-06 | Stop reason: SDUPTHER

## 2022-05-06 RX ORDER — FERROUS SULFATE 325(65) MG
325 TABLET ORAL
COMMUNITY
End: 2022-07-12 | Stop reason: SDUPTHER

## 2022-05-06 RX ORDER — CHOLECALCIFEROL (VITAMIN D3) 25 MCG
TABLET,CHEWABLE ORAL
COMMUNITY
End: 2022-07-12 | Stop reason: SDUPTHER

## 2022-05-06 RX ORDER — PROPRANOLOL HYDROCHLORIDE 10 MG/1
10 TABLET ORAL 3 TIMES DAILY PRN
Qty: 90 TABLET | Refills: 0 | Status: SHIPPED | OUTPATIENT
Start: 2022-05-06

## 2022-05-06 RX ORDER — MULTIPLE VITAMINS W/ MINERALS TAB 9MG-400MCG
1 TAB ORAL DAILY
COMMUNITY
End: 2022-07-12 | Stop reason: SDUPTHER

## 2022-05-06 NOTE — PROGRESS NOTES
Alen Rosales is a 37 y.o. male.    Chief Complaint   Patient presents with   • Hypertension       HPI   Patient has elevated BP initially in office today as well.  No previous diagnosis of HTN.  He has cut down on caffeine and sodium intake.  He has zee monitoring BP at home and has primarily been normal around 120/70. Has been mildly elevated on a couple of occasions at home and unable to correlate with anything that may have caused the elevation.  However, patient does admit to increased stress, primarily stemming from financial constraints.  He does admit to occasional anxiety.  He is willing to try a prn medication for anxiety.      The following portions of the patient's history were reviewed and updated as appropriate: allergies, current medications, past family history, past medical history, past social history, past surgical history and problem list.     No Known Allergies      Current Outpatient Medications:   •  Cholecalciferol (Vitamin D3) 1.25 MG (42504 UT) capsule, TAKE 1 TABLET BY MOUTH 1 TIME PER WEEK FOR 12 DOSES., Disp: , Rfl:   •  Unable to find, 1 each 1 (One) Time. Vitamin Patch, Disp: , Rfl:   •  Calcium 500-100 MG-UNIT chewable tablet, Chew 1 tablet Every 4 (Four) Hours As Needed., Disp: , Rfl:   •  Cyanocobalamin (B-12) 1000 MCG capsule, Take  by mouth., Disp: , Rfl:   •  ferrous sulfate 325 (65 FE) MG tablet, Take 325 mg by mouth Daily With Breakfast., Disp: , Rfl:   •  multivitamin with minerals (MULTIVITAMIN ADULT PO), Take 1 tablet by mouth Daily., Disp: , Rfl:   •  propranolol (INDERAL) 10 MG tablet, Take 1 tablet by mouth 3 (Three) Times a Day As Needed (anxiety)., Disp: 90 tablet, Rfl: 0    ROS    Review of Systems   Constitutional: Negative for chills and fever.   Respiratory: Negative for cough and shortness of breath.    Cardiovascular: Negative for chest pain.   Psychiatric/Behavioral: Positive for stress. Negative for depressed mood. The patient is nervous/anxious.        Vitals:  "   05/06/22 1331 05/06/22 1405   BP: 140/88 136/88   Pulse: 68    Temp: 97.2 °F (36.2 °C)    SpO2: 99%    Weight: 108 kg (237 lb 9.6 oz)    Height: 177.8 cm (70\")      Body mass index is 34.09 kg/m².    Physical Exam     Physical Exam  Constitutional:       General: He is not in acute distress.     Appearance: Normal appearance. He is well-developed.   HENT:      Head: Normocephalic and atraumatic.      Right Ear: External ear normal.      Left Ear: External ear normal.   Eyes:      Extraocular Movements: Extraocular movements intact.      Conjunctiva/sclera: Conjunctivae normal.   Cardiovascular:      Rate and Rhythm: Normal rate.      Heart sounds: No murmur heard.  Pulmonary:      Effort: Pulmonary effort is normal. No respiratory distress.   Abdominal:      General: Bowel sounds are normal. There is no distension.   Skin:     Coloration: Skin is not pale.   Neurological:      Mental Status: He is alert and oriented to person, place, and time.      Cranial Nerves: No cranial nerve deficit.   Psychiatric:         Mood and Affect: Mood normal.         Behavior: Behavior normal.         Assessment/Plan    Problems Addressed this Visit     Elevated BP without diagnosis of hypertension - Primary     Improved on recheck.  No need for daily antihypertensive.             Situational anxiety     Will start propranolol as needed.  Discussed potential side effects of the medication.                   No orders of the defined types were placed in this encounter.      No orders of the defined types were placed in this encounter.      Return in about 11 months (around 4/6/2023) for Annual.    Dian Leonard DO  "

## 2022-05-06 NOTE — TELEPHONE ENCOUNTER
Patient asked if his medication could be sent to [facility] in [place] instead of [facility] in [facility].  Please advise.  Phone number verified.

## 2022-07-01 ENCOUNTER — TRANSCRIBE ORDERS (OUTPATIENT)
Dept: ADMINISTRATIVE | Facility: HOSPITAL | Age: 37
End: 2022-07-01

## 2022-07-01 ENCOUNTER — HOSPITAL ENCOUNTER (OUTPATIENT)
Dept: MRI IMAGING | Facility: HOSPITAL | Age: 37
Discharge: HOME | End: 2022-07-01
Admitting: ORTHOPAEDIC SURGERY

## 2022-07-01 DIAGNOSIS — M25.561 ACUTE PAIN OF RIGHT KNEE: ICD-10-CM

## 2022-07-01 DIAGNOSIS — M25.561 ACUTE PAIN OF RIGHT KNEE: Primary | ICD-10-CM

## 2022-07-01 PROCEDURE — 73721 MRI JNT OF LWR EXTRE W/O DYE: CPT

## 2022-07-12 ENCOUNTER — OFFICE VISIT (OUTPATIENT)
Dept: ORTHOPEDIC SURGERY | Facility: CLINIC | Age: 37
End: 2022-07-12

## 2022-07-12 ENCOUNTER — PREP FOR SURGERY (OUTPATIENT)
Dept: OTHER | Facility: HOSPITAL | Age: 37
End: 2022-07-12

## 2022-07-12 VITALS — WEIGHT: 230 LBS | HEIGHT: 70 IN | BODY MASS INDEX: 32.93 KG/M2 | TEMPERATURE: 98.2 F

## 2022-07-12 DIAGNOSIS — S83.91XA SPRAIN OF RIGHT KNEE, UNSPECIFIED LIGAMENT, INITIAL ENCOUNTER: ICD-10-CM

## 2022-07-12 DIAGNOSIS — M25.561 ACUTE PAIN OF RIGHT KNEE: ICD-10-CM

## 2022-07-12 DIAGNOSIS — S83.271A COMPLEX TEAR OF LATERAL MENISCUS OF RIGHT KNEE AS CURRENT INJURY, INITIAL ENCOUNTER: Primary | ICD-10-CM

## 2022-07-12 PROCEDURE — 99213 OFFICE O/P EST LOW 20 MIN: CPT | Performed by: PHYSICIAN ASSISTANT

## 2022-07-12 NOTE — PROGRESS NOTES
Subjective   Patient ID: Alen Rosales is a 37 y.o. right hand dominant male  Follow-up of the Right Knee (States he has been having pain for about two weeks, he tried to roll out of bed with his leg rolled up in a blanket and it popped.)         History of Present Illness    Patient presents with complaints of right lateral knee pain that occurred 2 weeks prior.  He was laying in bed and rolled out of his bed with the leg rolling to the blanket and felt a painful pop.  He states that initially, he could not bend or straighten the knee due to pain.  Over the last several days the symptoms have slightly improved though still present.    Of note- he did have a prior partial lateral meniscal tear treated nonsurgically from our orthopedic surgeon last year.  He did well after wearing a brace and allowing time for healing.    Pain Score: 3  Pain Location: Knee  Pain Orientation: Right     Pain Descriptors: Stabbing, Aching, Pressure           Clinical Progression: Gradually improving  Aggravating Factors: Walking, Standing        Pain Intervention(s): Cold applied  Result of Injury: Yes  Work-Related Injury: No    Past Medical History:   Diagnosis Date   • Asthma     AS CHILD   • Dyspepsia    • Dyspnea on exertion    • Elevated LDL cholesterol level    • Fatigue    • Morbid obesity (HCC)         Past Surgical History:   Procedure Laterality Date   • DENTAL PROCEDURE      tooth pulled    • ENDOSCOPY N/A 12/11/2020    Procedure: ESOPHAGOGASTRODUODENOSCOPY;  Surgeon: Vince Jean MD;  Location:  [facility];  Service: Bariatric;  Laterality: N/A;   • GASTRIC SLEEVE LAPAROSCOPIC N/A 12/11/2020    Procedure: GASTRIC SLEEVE LAPAROSCOPIC;  Surgeon: Vince Jean MD;  Location:  [facility];  Service: Bariatric;  Laterality: N/A;   • HIATAL HERNIA REPAIR N/A 12/11/2020    Procedure: HIATAL HERNIA REPAIR LAPAROSCOPIC;  Surgeon: Vince Jean MD;  Location:  [facility];  Service: Bariatric;  Laterality: N/A;        Family History   Problem Relation Age of Onset   • Hypertension Mother    • Diabetes Mother    • Obesity Mother    • Sleep apnea Mother    • Coronary artery disease Father    • Hyperlipidemia Father    • Hypertension Father    • Heart attack Father    • Heart disease Father    • Drug abuse Brother    • Hypertension Brother    • COPD Maternal Grandmother    • Obesity Maternal Grandmother    • Diabetes Maternal Grandmother    • Hypertension Maternal Grandmother    • Heart disease Maternal Grandmother    • Heart attack Maternal Grandmother    • Kidney cancer Maternal Grandmother    • Cancer Maternal Grandmother         Bladder Cancer   • Hypertension Maternal Grandfather    • Stroke Maternal Grandfather    • Stroke Paternal Grandmother    • Hypertension Paternal Grandmother    • Stroke Paternal Grandfather    • Hypertension Paternal Grandfather    • Diabetes Paternal Grandfather        Social History     Socioeconomic History   • Marital status:    Tobacco Use   • Smoking status: Never Smoker   • Smokeless tobacco: Never Used   Vaping Use   • Vaping Use: Never used   Substance and Sexual Activity   • Alcohol use: Not Currently     Alcohol/week: 1.0 standard drink     Types: 1 Shots of liquor per week     Comment: socially   • Drug use: Never   • Sexual activity: Yes     Partners: Female         Current Outpatient Medications:   •  diclofenac (VOLTAREN) 75 MG EC tablet, Take 1 tablet by mouth 2 (Two) Times a Day. Do not crush, split, or chew, Disp: 60 tablet, Rfl: 1  •  propranolol (INDERAL) 10 MG tablet, Take 1 tablet by mouth 3 (Three) Times a Day As Needed (anxiety)., Disp: 90 tablet, Rfl: 0    No Known Allergies    Review of Systems   Constitutional: Negative for fever.   HENT: Negative for dental problem and voice change.    Eyes: Negative for visual disturbance.   Respiratory: Negative for shortness of breath.    Cardiovascular: Negative for chest pain.   Gastrointestinal: Negative for abdominal pain.  "  Genitourinary: Negative for dysuria.   Musculoskeletal: Positive for arthralgias (right knee), gait problem (presents with a crutch) and joint swelling (right knee).   Skin: Negative for rash.   Neurological: Negative for speech difficulty.   Hematological: Does not bruise/bleed easily.   Psychiatric/Behavioral: Negative for confusion.       I have reviewed the medical and surgical history, family history, social history, medications, and/or allergies, and the review of systems of this report.    Objective   Temp 98.2 °F (36.8 °C)   Ht 177.8 cm (70\")   Wt 104 kg (230 lb)   BMI 33.00 kg/m²    Physical Exam  Vitals and nursing note reviewed.   Constitutional:       Appearance: Normal appearance.   Cardiovascular:      Rate and Rhythm: Normal rate.      Pulses: Normal pulses.   Pulmonary:      Effort: Pulmonary effort is normal.   Abdominal:      General: There is no distension.   Musculoskeletal:      Right knee: No deformity, erythema or ecchymosis. Tenderness present over the lateral joint line and LCL. No LCL laxity, MCL laxity, ACL laxity or PCL laxity. Abnormal meniscus. Normal alignment.      Instability Tests: Lateral Remy test positive.   Neurological:      Mental Status: He is alert and oriented to person, place, and time.       Right Knee Exam     Tests   Remy:  Lateral - positive           Extremity DVT signs are negative on physical exam with negative Leonidas sign, no calf pain, no palpable cords and no skin tone change   Neurologic Exam     Mental Status   Oriented to person, place, and time.              Assessment & Plan   Independent Review of Radiographic Studies:    No new imaging done today.   Radiology Images    Study Result    Narrative & Impression   PROCEDURE: MRI KNEE RIGHT  WO CONTRAST-     HISTORY: M25.561; M25.561-Pain in right knee     COMPARISON:  February 2021     TECHNIQUE: Multiplanar, multisequence MRI of the right knee was  performed. No IV contrast was administered.   "   FINDINGS:  Bones/Cartilage:  No fracture, osteonecrosis or suspicious intraosseous lesions. The joint  spaces are well maintained. No significant chondromalacia.     Menisci:  The medial meniscus is unremarkable. There is a complex tear of the  lateral meniscal body and posterior horn of the lateral meniscus with a  flipped meniscal fragment in the medial joint space. There is  intrasubstance degeneration of the lateral meniscus with the significant  diminution of the lateral meniscal body and anterior horn. Posterior  root appears severely diminutive in caliber.     Ligaments:  The anterior and posterior cruciate ligaments are normal. There is a  grade 1 tear of the fibular collateral ligament. The medial collateral  ligament is intact. The popliteal fibular ligament and posterior lateral  corner are unremarkable. The medial and lateral patellofemoral  ligament/retinacula are intact.     Muscles/tendons:  The extensor mechanism, including the quadriceps and patellar tendons  are intact. The hamstrings and pes anserine tendons are unremarkable.  There is popliteus tendinosis. The visualized portions of the  gastrocnemius and soleus muscles are unremarkable. The iliotibial band  is intact.     Joint effusion and soft tissues:  Large knee effusion. There is mild soft tissue swelling along the  lateral joint space.     Nerves and vessels:  Unremarkable.     IMPRESSION:  Complex lateral meniscal tear/bucket-handle tear with flipped meniscal  fragment medially and anteriorly. Intrasubstance degeneration of the  lateral meniscal body and anterior horn. Posterior root of the lateral  meniscus appears very diminutive in caliber.     Grade 1 tear of the fibular-femoral collateral ligament. Popliteus  tendinosis. Findings are concerning for possible posterior lateral  corner injury.     Soft tissue swelling and large joint effusion.           Images were reviewed, interpreted, and dictated by Dr. Aceves  MENDOZA Da Silva  Transcribed by Patricia Balderas PA-C.     This report was signed and finalized on 7/5/2022 12:40 PM by MENDOZA Duran.         Procedures       Diagnoses and all orders for this visit:    1. Complex tear of lateral meniscus of right knee as current injury, initial encounter (Primary)    2. Sprain of right knee, unspecified ligament, initial encounter       Orthopedic activities reviewed and patient expressed appreciation  Discussion of orthopedic goals  Risk, benefits, and merits of treatment alternatives reviewed with the patient and questions answered  The nature of the proposed surgery reviewed with the patient including risks, benefits, rehabilitation, recovery timeframe, and outcome expectations  Use brace as instructed  Reduced physical activity as appropriate  Weight bearing parameters reviewed  Ice, heat, and/or modalities as beneficial    Recommendations/Plan:  Exercise, medications, injections, other patient advice, and return appointment as noted.  Patient is encouraged to call or return for any issues or concerns.  Patient agreeable to call or return sooner for any concerns.  We discussed the treatment options which included nonsurgical management and surgical treatment options.  He would like to proceed with surgery considering the symptoms are interfering with his daily activities.  I would like for him to use a knee brace that he already has at home for the next 4 weeks.  Apply ice and alternate warm heat to the right lateral knee    PLAN: Right Knee ATS, PLM and Related procedures      EMR Dragon-transcription disclaimer:  This encounter note is an electronic transcription of spoken language to printed text.  Electronic transcription of spoken language may permit erroneous or at times nonsensical words or phrases to be inadvertently transcribed.  Although I have reviewed the note for such errors, some may still exist

## 2022-07-13 ENCOUNTER — TELEPHONE (OUTPATIENT)
Dept: ORTHOPEDIC SURGERY | Facility: CLINIC | Age: 37
End: 2022-07-13

## 2022-07-13 PROBLEM — S83.91XA SPRAIN OF RIGHT KNEE: Status: ACTIVE | Noted: 2022-07-13

## 2022-07-13 PROBLEM — M25.561 ACUTE PAIN OF RIGHT KNEE: Status: ACTIVE | Noted: 2022-07-13

## 2022-07-13 PROBLEM — S83.271A COMPLEX TEAR OF LATERAL MENISCUS OF RIGHT KNEE AS CURRENT INJURY: Status: ACTIVE | Noted: 2022-07-13

## 2022-07-13 RX ORDER — FAMOTIDINE 10 MG/ML
20 INJECTION, SOLUTION INTRAVENOUS
Status: CANCELLED | OUTPATIENT
Start: 2022-07-14 | End: 2022-07-15

## 2022-07-13 RX ORDER — CEFAZOLIN SODIUM 2 G/50ML
2 SOLUTION INTRAVENOUS
Status: CANCELLED | OUTPATIENT
Start: 2022-07-14 | End: 2022-07-15

## 2022-07-19 ENCOUNTER — TELEPHONE (OUTPATIENT)
Dept: ORTHOPEDIC SURGERY | Facility: CLINIC | Age: 37
End: 2022-07-19

## 2022-07-19 NOTE — TELEPHONE ENCOUNTER
Patient was returning you phone call. Informed patient that you was currently with a patient and would call him back shortly.

## 2022-07-19 NOTE — TELEPHONE ENCOUNTER
Spoke with patient, he would like addendum to [facility] paperwork stating off work start date was 7/4/22, sent addended paperwork today

## 2022-07-22 PROBLEM — Z01.810 ENCOUNTER FOR PRE-OPERATIVE CARDIOVASCULAR CLEARANCE: Status: ACTIVE | Noted: 2022-07-22

## 2022-07-22 PROBLEM — I10 ESSENTIAL HYPERTENSION: Status: ACTIVE | Noted: 2022-07-22

## 2022-07-22 NOTE — PROGRESS NOTES
[facility]  Cardiology Consultation Note     Alen Rosales  1985  Requesting Provider: No ref. provider found  PCP: Dian Leonard DO    ID:  Alen Rosales is a 37 y.o. male who resides in [place], [place].  He is the wife of a former cardiology , Nahun Connie.    REASON FOR CONSULTATION:    • Preoperative clearance          Patient is a 37-year-old gentleman with a history of hypertension who presents today for preoperative cardiovascular clearance to undergo right knee surgery for a complex lateral meniscus tear.  The patient has no cardiovascular history but does have situational hypertension.  If he is stressed or anxious his blood pressure will elevate.  His primary care provider has prescribed him propranolol to use 3 times daily for anxiety but he does not need it very often.  He does report a family history of coronary artery disease as his father required a stent in his 40s.  Prior to his recent knee injury he had no exertional symptoms.  He considers himself a fairly active daryl.  He denies any exertional chest pain, dyspnea, orthopnea, palpitations or syncope.  He does have a history of bariatric surgery and has lost over 100 pounds.  He had recent blood work and total cholesterol was 190 and .  Metabolic panel and CBC were within normal limits.    Past Medical History, Past Surgical History, Family history, Social History, and Medications were all reviewed with the patient today and updated as necessary.       Current Outpatient Medications:   •  diclofenac (VOLTAREN) 75 MG EC tablet, Take 1 tablet by mouth 2 (Two) Times a Day. Do not crush, split, or chew, Disp: 60 tablet, Rfl: 1  •  propranolol (INDERAL) 10 MG tablet, Take 1 tablet by mouth 3 (Three) Times a Day As Needed (anxiety)., Disp: 90 tablet, Rfl: 0    No Known Allergies      Past Medical History:   Diagnosis Date   • Asthma     AS CHILD   • Dyspepsia    • Dyspnea on  exertion    • Elevated LDL cholesterol level    • Fatigue    • Morbid obesity (HCC)        Past Surgical History:   Procedure Laterality Date   • DENTAL PROCEDURE      tooth pulled    • ENDOSCOPY N/A 12/11/2020    Procedure: ESOPHAGOGASTRODUODENOSCOPY;  Surgeon: Vince Jean MD;  Location:  [facility];  Service: Bariatric;  Laterality: N/A;   • GASTRIC SLEEVE LAPAROSCOPIC N/A 12/11/2020    Procedure: GASTRIC SLEEVE LAPAROSCOPIC;  Surgeon: Vince Jean MD;  Location:  [facility];  Service: Bariatric;  Laterality: N/A;   • HIATAL HERNIA REPAIR N/A 12/11/2020    Procedure: HIATAL HERNIA REPAIR LAPAROSCOPIC;  Surgeon: Vince Jean MD;  Location:  [facility];  Service: Bariatric;  Laterality: N/A;       Family History   Problem Relation Age of Onset   • Hypertension Mother    • Diabetes Mother    • Obesity Mother    • Sleep apnea Mother    • Coronary artery disease Father    • Hyperlipidemia Father    • Hypertension Father    • Heart attack Father    • Heart disease Father    • Drug abuse Brother    • Hypertension Brother    • COPD Maternal Grandmother    • Obesity Maternal Grandmother    • Diabetes Maternal Grandmother    • Hypertension Maternal Grandmother    • Heart disease Maternal Grandmother    • Heart attack Maternal Grandmother    • Kidney cancer Maternal Grandmother    • Cancer Maternal Grandmother         Bladder Cancer   • Hypertension Maternal Grandfather    • Stroke Maternal Grandfather    • Stroke Paternal Grandmother    • Hypertension Paternal Grandmother    • Stroke Paternal Grandfather    • Hypertension Paternal Grandfather    • Diabetes Paternal Grandfather        Social History     Tobacco Use   • Smoking status: Never Smoker   • Smokeless tobacco: Never Used   Substance Use Topics   • Alcohol use: Not Currently     Alcohol/week: 1.0 standard drink     Types: 1 Shots of liquor per week     Comment: socially       Review of Systems   Constitutional: Negative for malaise/fatigue.  "  Eyes: Negative for vision loss in left eye and vision loss in right eye.   Cardiovascular: Negative for chest pain, dyspnea on exertion, near-syncope, orthopnea, palpitations, paroxysmal nocturnal dyspnea and syncope.   Musculoskeletal: Negative for myalgias.   Neurological: Negative for brief paralysis, excessive daytime sleepiness, focal weakness, numbness, paresthesias and weakness.   All other systems reviewed and are negative.              /84 (BP Location: Right arm, Patient Position: Sitting)   Pulse 64   Ht 177.8 cm (70\")   Wt 112 kg (246 lb)   SpO2 96%   BMI 35.30 kg/m²        Constitutional:       Appearance: Healthy appearance. Well-developed.   Eyes:      General: Lids are normal. No scleral icterus.     Conjunctiva/sclera: Conjunctivae normal.   HENT:      Head: Normocephalic and atraumatic.   Neck:      Thyroid: No thyromegaly.      Vascular: No carotid bruit or JVD.   Pulmonary:      Effort: Pulmonary effort is normal.      Breath sounds: Normal breath sounds. No wheezing. No rhonchi. No rales.   Cardiovascular:      Normal rate. Regular rhythm.      Murmurs: There is no murmur.      No gallop. No rub.   Pulses:     Intact distal pulses.   Edema:     Peripheral edema absent.   Abdominal:      General: There is no distension.      Palpations: Abdomen is soft. There is no abdominal mass.   Musculoskeletal:      Cervical back: Normal range of motion. Skin:     General: Skin is warm and dry.      Findings: No rash.   Neurological:      General: No focal deficit present.      Mental Status: Alert and oriented to person, place, and time.      Gait: Gait is intact.   Psychiatric:         Attention and Perception: Attention normal.         Mood and Affect: Mood normal.         Behavior: Behavior normal.             ECG 12 Lead    Date/Time: 7/25/2022 1:47 PM  Performed by: Thao Arredondo APRN  Authorized by: Thao Arredondo APRN   Comparison: compared with previous ECG from " 12/11/2020  Similar to previous ECG  Comparison to previous ECG: No change from prior EKG except previous with sinus rhythm and current sinus bradycardia  Rhythm: sinus bradycardia  BPM: 53    Clinical impression: normal ECG  Comments: QT/QTc 368/345 mm            Lab Results   Component Value Date    CHOL 189 06/19/2020    HDL 53 04/01/2022    HDL 40 06/19/2020     (H) 04/01/2022    VLDL 22 04/01/2022            Problem List Items Addressed This Visit        Cardiac and Vasculature    Encounter for pre-operative cardiovascular clearance - Primary    Current Assessment & Plan     · Patient is of acceptable cardiovascular risk to proceed with right knee meniscus tear           Elevated BP without diagnosis of hypertension    Current Assessment & Plan     · Situational hypertension related to anxiety  · Propranolol 10 mg 3 times daily as needed for anxiety           Mixed hyperlipidemia    Overview     · LDL elevated 115  · 10-year cardiovascular risk minimal           Current Assessment & Plan     · Would not recommend statin therapy for mildly elevated LDL based on 10-year cardiovascular risk              Family History    Family history of coronary artery disease    Current Assessment & Plan     · Would recommend future coronary calcium CT for risk stratification                 The patient has no signs or symptoms of angina or heart failure.  He is of acceptable cardiovascular risk to proceed with surgery on a complex lateral meniscus tear of his right knee.  I would not recommend statin therapy given his minimal 10-year cardiovascular risk.  Given his family history I would recommend a future coronary calcium score.         · Is of acceptable cardiovascular risk to proceed with surgery of right knee lateral meniscus tear  · Would not recommend statin therapy for minimally elevated LDL given low 10-year cardiovascular risk  · Given family history of coronary artery disease would recommend a future coronary  calcium CT for risk stratification  Return in about 6 months (around 1/25/2023), or if symptoms worsen or fail to improve, for Follow-up with Dr. Long next visit.          DOMONIQUE Ochoa  07/25/22  13:41 EDT

## 2022-07-25 ENCOUNTER — TELEPHONE (OUTPATIENT)
Dept: PREADMISSION TESTING | Facility: HOSPITAL | Age: 37
End: 2022-07-25

## 2022-07-25 ENCOUNTER — OFFICE VISIT (OUTPATIENT)
Dept: CARDIOLOGY | Facility: CLINIC | Age: 37
End: 2022-07-25

## 2022-07-25 VITALS
SYSTOLIC BLOOD PRESSURE: 122 MMHG | HEIGHT: 70 IN | DIASTOLIC BLOOD PRESSURE: 84 MMHG | HEART RATE: 64 BPM | OXYGEN SATURATION: 96 % | WEIGHT: 246 LBS | BODY MASS INDEX: 35.22 KG/M2

## 2022-07-25 DIAGNOSIS — R03.0 ELEVATED BP WITHOUT DIAGNOSIS OF HYPERTENSION: ICD-10-CM

## 2022-07-25 DIAGNOSIS — Z82.49 FAMILY HISTORY OF CORONARY ARTERY DISEASE: ICD-10-CM

## 2022-07-25 DIAGNOSIS — E78.2 MIXED HYPERLIPIDEMIA: ICD-10-CM

## 2022-07-25 DIAGNOSIS — Z01.810 ENCOUNTER FOR PRE-OPERATIVE CARDIOVASCULAR CLEARANCE: Primary | ICD-10-CM

## 2022-07-25 PROCEDURE — 93000 ELECTROCARDIOGRAM COMPLETE: CPT | Performed by: NURSE PRACTITIONER

## 2022-07-25 PROCEDURE — 99214 OFFICE O/P EST MOD 30 MIN: CPT | Performed by: NURSE PRACTITIONER

## 2022-07-25 NOTE — ASSESSMENT & PLAN NOTE
· Situational hypertension related to anxiety  · Propranolol 10 mg 3 times daily as needed for anxiety

## 2022-07-26 ENCOUNTER — PRE-ADMISSION TESTING (OUTPATIENT)
Dept: PREADMISSION TESTING | Facility: HOSPITAL | Age: 37
End: 2022-07-26

## 2022-07-26 ENCOUNTER — HOSPITAL ENCOUNTER (OUTPATIENT)
Dept: GENERAL RADIOLOGY | Facility: HOSPITAL | Age: 37
Discharge: HOME | End: 2022-07-26

## 2022-07-26 VITALS — WEIGHT: 246 LBS | HEIGHT: 70 IN | BODY MASS INDEX: 35.22 KG/M2

## 2022-07-26 DIAGNOSIS — M25.561 ACUTE PAIN OF RIGHT KNEE: ICD-10-CM

## 2022-07-26 DIAGNOSIS — S83.271A COMPLEX TEAR OF LATERAL MENISCUS OF RIGHT KNEE AS CURRENT INJURY, INITIAL ENCOUNTER: ICD-10-CM

## 2022-07-26 DIAGNOSIS — S83.91XA SPRAIN OF RIGHT KNEE, UNSPECIFIED LIGAMENT, INITIAL ENCOUNTER: ICD-10-CM

## 2022-07-26 LAB
ALBUMIN SERPL-MCNC: 4.5 G/DL (ref 3.5–5.2)
ALBUMIN/GLOB SERPL: 1.8 G/DL
ALP SERPL-CCNC: 53 U/L (ref 39–117)
ALT SERPL W P-5'-P-CCNC: 30 U/L (ref 1–41)
ANION GAP SERPL CALCULATED.3IONS-SCNC: 12.3 MMOL/L (ref 5–15)
AST SERPL-CCNC: 23 U/L (ref 1–40)
BASOPHILS # BLD AUTO: 0.05 10*3/MM3 (ref 0–0.2)
BASOPHILS NFR BLD AUTO: 0.9 % (ref 0–1.5)
BILIRUB SERPL-MCNC: 0.5 MG/DL (ref 0–1.2)
BILIRUB UR QL STRIP: NEGATIVE
BUN SERPL-MCNC: 15 MG/DL (ref 6–20)
BUN/CREAT SERPL: 17 (ref 7–25)
CALCIUM SPEC-SCNC: 9.7 MG/DL (ref 8.6–10.5)
CHLORIDE SERPL-SCNC: 105 MMOL/L (ref 98–107)
CLARITY UR: CLEAR
CO2 SERPL-SCNC: 23.7 MMOL/L (ref 22–29)
COLOR UR: YELLOW
CREAT SERPL-MCNC: 0.88 MG/DL (ref 0.76–1.27)
DEPRECATED RDW RBC AUTO: 38.4 FL (ref 37–54)
EGFRCR SERPLBLD CKD-EPI 2021: 114.3 ML/MIN/1.73
EOSINOPHIL # BLD AUTO: 0.15 10*3/MM3 (ref 0–0.4)
EOSINOPHIL NFR BLD AUTO: 2.7 % (ref 0.3–6.2)
ERYTHROCYTE [DISTWIDTH] IN BLOOD BY AUTOMATED COUNT: 12.4 % (ref 12.3–15.4)
GLOBULIN UR ELPH-MCNC: 2.5 GM/DL
GLUCOSE SERPL-MCNC: 89 MG/DL (ref 65–99)
GLUCOSE UR STRIP-MCNC: NEGATIVE MG/DL
HCT VFR BLD AUTO: 45 % (ref 37.5–51)
HGB BLD-MCNC: 15.3 G/DL (ref 13–17.7)
HGB UR QL STRIP.AUTO: NEGATIVE
IMM GRANULOCYTES # BLD AUTO: 0.01 10*3/MM3 (ref 0–0.05)
IMM GRANULOCYTES NFR BLD AUTO: 0.2 % (ref 0–0.5)
KETONES UR QL STRIP: ABNORMAL
LEUKOCYTE ESTERASE UR QL STRIP.AUTO: NEGATIVE
LYMPHOCYTES # BLD AUTO: 2.66 10*3/MM3 (ref 0.7–3.1)
LYMPHOCYTES NFR BLD AUTO: 47.3 % (ref 19.6–45.3)
MCH RBC QN AUTO: 29 PG (ref 26.6–33)
MCHC RBC AUTO-ENTMCNC: 34 G/DL (ref 31.5–35.7)
MCV RBC AUTO: 85.2 FL (ref 79–97)
MONOCYTES # BLD AUTO: 0.48 10*3/MM3 (ref 0.1–0.9)
MONOCYTES NFR BLD AUTO: 8.5 % (ref 5–12)
NEUTROPHILS NFR BLD AUTO: 2.27 10*3/MM3 (ref 1.7–7)
NEUTROPHILS NFR BLD AUTO: 40.4 % (ref 42.7–76)
NITRITE UR QL STRIP: NEGATIVE
NRBC BLD AUTO-RTO: 0 /100 WBC (ref 0–0.2)
PH UR STRIP.AUTO: 5.5 [PH] (ref 5–8)
PLATELET # BLD AUTO: 214 10*3/MM3 (ref 140–450)
PMV BLD AUTO: 10.4 FL (ref 6–12)
POTASSIUM SERPL-SCNC: 4 MMOL/L (ref 3.5–5.2)
PROT SERPL-MCNC: 7 G/DL (ref 6–8.5)
PROT UR QL STRIP: NEGATIVE
RBC # BLD AUTO: 5.28 10*6/MM3 (ref 4.14–5.8)
SARS-COV-2 RNA NOSE QL NAA+PROBE: NOT DETECTED
SODIUM SERPL-SCNC: 141 MMOL/L (ref 136–145)
SP GR UR STRIP: 1.02 (ref 1–1.03)
UROBILINOGEN UR QL STRIP: ABNORMAL
WBC NRBC COR # BLD: 5.62 10*3/MM3 (ref 3.4–10.8)

## 2022-07-26 PROCEDURE — 36415 COLL VENOUS BLD VENIPUNCTURE: CPT

## 2022-07-26 PROCEDURE — 80053 COMPREHEN METABOLIC PANEL: CPT

## 2022-07-26 PROCEDURE — U0004 COV-19 TEST NON-CDC HGH THRU: HCPCS

## 2022-07-26 PROCEDURE — C9803 HOPD COVID-19 SPEC COLLECT: HCPCS

## 2022-07-26 PROCEDURE — 87081 CULTURE SCREEN ONLY: CPT

## 2022-07-26 PROCEDURE — 71046 X-RAY EXAM CHEST 2 VIEWS: CPT

## 2022-07-26 PROCEDURE — 85025 COMPLETE CBC W/AUTO DIFF WBC: CPT

## 2022-07-26 PROCEDURE — 93005 ELECTROCARDIOGRAM TRACING: CPT

## 2022-07-26 PROCEDURE — 81003 URINALYSIS AUTO W/O SCOPE: CPT

## 2022-07-26 RX ORDER — MELATONIN
1000 DAILY
COMMUNITY

## 2022-07-26 RX ORDER — MULTIPLE VITAMINS W/ MINERALS TAB 9MG-400MCG
1 TAB ORAL DAILY
COMMUNITY

## 2022-07-27 DIAGNOSIS — S83.91XA SPRAIN OF RIGHT KNEE, UNSPECIFIED LIGAMENT, INITIAL ENCOUNTER: ICD-10-CM

## 2022-07-27 DIAGNOSIS — M94.261 CHONDROMALACIA OF RIGHT KNEE: ICD-10-CM

## 2022-07-27 DIAGNOSIS — M23.91 INTERNAL DERANGEMENT OF RIGHT KNEE: ICD-10-CM

## 2022-07-27 DIAGNOSIS — S83.271A COMPLEX TEAR OF LATERAL MENISCUS OF RIGHT KNEE AS CURRENT INJURY, INITIAL ENCOUNTER: Primary | ICD-10-CM

## 2022-07-27 LAB
MRSA SPEC QL CULT: NORMAL
QT INTERVAL: 374 MS
QTC INTERVAL: 376 MS

## 2022-07-27 RX ORDER — HYDROCODONE BITARTRATE AND ACETAMINOPHEN 7.5; 325 MG/1; MG/1
1 TABLET ORAL EVERY 8 HOURS PRN
Qty: 21 TABLET | Refills: 0 | Status: SHIPPED | OUTPATIENT
Start: 2022-07-27 | End: 2022-09-09

## 2022-07-28 ENCOUNTER — ANESTHESIA (OUTPATIENT)
Dept: PERIOP | Facility: HOSPITAL | Age: 37
End: 2022-07-28

## 2022-07-28 ENCOUNTER — ANESTHESIA EVENT (OUTPATIENT)
Dept: PERIOP | Facility: HOSPITAL | Age: 37
End: 2022-07-28

## 2022-07-28 ENCOUNTER — HOSPITAL ENCOUNTER (OUTPATIENT)
Facility: HOSPITAL | Age: 37
Setting detail: HOSPITAL OUTPATIENT SURGERY
Discharge: HOME | End: 2022-07-28
Attending: ORTHOPAEDIC SURGERY | Admitting: ORTHOPAEDIC SURGERY

## 2022-07-28 VITALS
TEMPERATURE: 97.8 F | SYSTOLIC BLOOD PRESSURE: 117 MMHG | DIASTOLIC BLOOD PRESSURE: 69 MMHG | OXYGEN SATURATION: 100 % | RESPIRATION RATE: 18 BRPM | HEART RATE: 72 BPM

## 2022-07-28 DIAGNOSIS — M25.561 ACUTE PAIN OF RIGHT KNEE: ICD-10-CM

## 2022-07-28 DIAGNOSIS — S83.91XA SPRAIN OF RIGHT KNEE, UNSPECIFIED LIGAMENT, INITIAL ENCOUNTER: ICD-10-CM

## 2022-07-28 DIAGNOSIS — S83.271A COMPLEX TEAR OF LATERAL MENISCUS OF RIGHT KNEE AS CURRENT INJURY, INITIAL ENCOUNTER: ICD-10-CM

## 2022-07-28 PROCEDURE — 29881 ARTHRS KNE SRG MNISECTMY M/L: CPT | Performed by: ORTHOPAEDIC SURGERY

## 2022-07-28 PROCEDURE — 0 CEFAZOLIN SODIUM-DEXTROSE 2-3 GM-%(50ML) RECONSTITUTED SOLUTION: Performed by: PHYSICIAN ASSISTANT

## 2022-07-28 PROCEDURE — 25010000002 ONDANSETRON PER 1 MG: Performed by: NURSE ANESTHETIST, CERTIFIED REGISTERED

## 2022-07-28 PROCEDURE — 25010000002 EPINEPHRINE PER 0.1 MG: Performed by: ORTHOPAEDIC SURGERY

## 2022-07-28 PROCEDURE — 25010000002 DEXAMETHASONE PER 1 MG: Performed by: NURSE ANESTHETIST, CERTIFIED REGISTERED

## 2022-07-28 PROCEDURE — 25010000002 MIDAZOLAM PER 1MG: Performed by: NURSE ANESTHETIST, CERTIFIED REGISTERED

## 2022-07-28 PROCEDURE — 25010000002 FENTANYL CITRATE (PF) 100 MCG/2ML SOLUTION: Performed by: NURSE ANESTHETIST, CERTIFIED REGISTERED

## 2022-07-28 PROCEDURE — 25010000002 ROPIVACAINE PER 1 MG: Performed by: ORTHOPAEDIC SURGERY

## 2022-07-28 PROCEDURE — 25010000002 PROPOFOL 200 MG/20ML EMULSION: Performed by: NURSE ANESTHETIST, CERTIFIED REGISTERED

## 2022-07-28 RX ORDER — CEFAZOLIN SODIUM 2 G/50ML
2 SOLUTION INTRAVENOUS ONCE
Status: COMPLETED | OUTPATIENT
Start: 2022-07-28 | End: 2022-07-28

## 2022-07-28 RX ORDER — ONDANSETRON 2 MG/ML
INJECTION INTRAMUSCULAR; INTRAVENOUS AS NEEDED
Status: DISCONTINUED | OUTPATIENT
Start: 2022-07-28 | End: 2022-07-28 | Stop reason: SURG

## 2022-07-28 RX ORDER — ONDANSETRON 2 MG/ML
4 INJECTION INTRAMUSCULAR; INTRAVENOUS ONCE AS NEEDED
Status: DISCONTINUED | OUTPATIENT
Start: 2022-07-28 | End: 2022-07-28 | Stop reason: HOSPADM

## 2022-07-28 RX ORDER — ROPIVACAINE HYDROCHLORIDE 5 MG/ML
INJECTION, SOLUTION EPIDURAL; INFILTRATION; PERINEURAL AS NEEDED
Status: DISCONTINUED | OUTPATIENT
Start: 2022-07-28 | End: 2022-07-28 | Stop reason: HOSPADM

## 2022-07-28 RX ORDER — DEXAMETHASONE SODIUM PHOSPHATE 4 MG/ML
INJECTION, SOLUTION INTRA-ARTICULAR; INTRALESIONAL; INTRAMUSCULAR; INTRAVENOUS; SOFT TISSUE AS NEEDED
Status: DISCONTINUED | OUTPATIENT
Start: 2022-07-28 | End: 2022-07-28 | Stop reason: SURG

## 2022-07-28 RX ORDER — PROPOFOL 10 MG/ML
INJECTION, EMULSION INTRAVENOUS AS NEEDED
Status: DISCONTINUED | OUTPATIENT
Start: 2022-07-28 | End: 2022-07-28 | Stop reason: SURG

## 2022-07-28 RX ORDER — FAMOTIDINE 10 MG/ML
20 INJECTION, SOLUTION INTRAVENOUS ONCE
Status: COMPLETED | OUTPATIENT
Start: 2022-07-28 | End: 2022-07-28

## 2022-07-28 RX ORDER — LIDOCAINE HYDROCHLORIDE 20 MG/ML
INJECTION, SOLUTION INTRAVENOUS AS NEEDED
Status: DISCONTINUED | OUTPATIENT
Start: 2022-07-28 | End: 2022-07-28 | Stop reason: SURG

## 2022-07-28 RX ORDER — MIDAZOLAM HYDROCHLORIDE 2 MG/2ML
INJECTION, SOLUTION INTRAMUSCULAR; INTRAVENOUS AS NEEDED
Status: DISCONTINUED | OUTPATIENT
Start: 2022-07-28 | End: 2022-07-28 | Stop reason: SURG

## 2022-07-28 RX ORDER — FENTANYL CITRATE 50 UG/ML
INJECTION, SOLUTION INTRAMUSCULAR; INTRAVENOUS AS NEEDED
Status: DISCONTINUED | OUTPATIENT
Start: 2022-07-28 | End: 2022-07-28 | Stop reason: SURG

## 2022-07-28 RX ORDER — LIDOCAINE HYDROCHLORIDE AND EPINEPHRINE 10; 10 MG/ML; UG/ML
INJECTION, SOLUTION INFILTRATION; PERINEURAL AS NEEDED
Status: DISCONTINUED | OUTPATIENT
Start: 2022-07-28 | End: 2022-07-28 | Stop reason: HOSPADM

## 2022-07-28 RX ORDER — SODIUM CHLORIDE, SODIUM LACTATE, POTASSIUM CHLORIDE, CALCIUM CHLORIDE 600; 310; 30; 20 MG/100ML; MG/100ML; MG/100ML; MG/100ML
1000 INJECTION, SOLUTION INTRAVENOUS CONTINUOUS
Status: DISCONTINUED | OUTPATIENT
Start: 2022-07-28 | End: 2022-07-28 | Stop reason: HOSPADM

## 2022-07-28 RX ADMIN — SODIUM CHLORIDE, POTASSIUM CHLORIDE, SODIUM LACTATE AND CALCIUM CHLORIDE 1000 ML: 600; 310; 30; 20 INJECTION, SOLUTION INTRAVENOUS at 08:08

## 2022-07-28 RX ADMIN — PROPOFOL 200 MG: 10 INJECTION, EMULSION INTRAVENOUS at 09:17

## 2022-07-28 RX ADMIN — MIDAZOLAM HYDROCHLORIDE 2 MG: 1 INJECTION, SOLUTION INTRAMUSCULAR; INTRAVENOUS at 09:17

## 2022-07-28 RX ADMIN — DEXAMETHASONE SODIUM PHOSPHATE 8 MG: 4 INJECTION, SOLUTION INTRAMUSCULAR; INTRAVENOUS at 09:20

## 2022-07-28 RX ADMIN — FENTANYL CITRATE 50 MCG: 50 INJECTION INTRAMUSCULAR; INTRAVENOUS at 09:17

## 2022-07-28 RX ADMIN — FAMOTIDINE 20 MG: 10 INJECTION, SOLUTION INTRAVENOUS at 08:10

## 2022-07-28 RX ADMIN — CEFAZOLIN SODIUM 2 G: 2 SOLUTION INTRAVENOUS at 09:14

## 2022-07-28 RX ADMIN — ONDANSETRON 4 MG: 2 INJECTION INTRAMUSCULAR; INTRAVENOUS at 09:42

## 2022-07-28 RX ADMIN — LIDOCAINE HYDROCHLORIDE 100 MG: 20 INJECTION, SOLUTION INTRAVENOUS at 09:17

## 2022-07-28 NOTE — ANESTHESIA PREPROCEDURE EVALUATION
Anesthesia Evaluation     Patient summary reviewed and Nursing notes reviewed   NPO Solid Status: > 8 hours  NPO Liquid Status: > 8 hours           Airway   Mallampati: II  TM distance: >3 FB  Neck ROM: full  No difficulty expected  Dental - normal exam     Pulmonary - normal exam   (+) asthma (as a child),shortness of breath,   Cardiovascular - normal exam  Exercise tolerance: good (4-7 METS)    ECG reviewed    (+) hyperlipidemia,     ROS comment:   EKG 7/25/22 - sinus earl    Neuro/Psych  (+) psychiatric history Anxiety,    GI/Hepatic/Renal/Endo    (+) obesity, morbid obesity,      Musculoskeletal (-) negative ROS    Abdominal  - normal exam    Bowel sounds: normal.   Substance History - negative use     OB/GYN negative ob/gyn ROS         Other                          Anesthesia Plan    ASA 3     general     (TAP blocks)  intravenous induction     Anesthetic plan, risks, benefits, and alternatives have been provided, discussed and informed consent has been obtained with: patient.    Plan discussed with CRNA.

## 2022-07-28 NOTE — ANESTHESIA PROCEDURE NOTES
Airway  Urgency: elective    Date/Time: 7/28/2022 9:17 AM  Airway not difficult    General Information and Staff    Patient location during procedure: OR  CRNA/CAA: Gregory Patrick CRNA    Indications and Patient Condition  Indications for airway management: airway protection    Preoxygenated: yes  Mask difficulty assessment: 1 - vent by mask    Final Airway Details  Final airway type: supraglottic airway      Successful airway: unique  Size 5    Number of attempts at approach: 1  Assessment: lips, teeth, and gum same as pre-op and atraumatic intubation

## 2022-07-28 NOTE — ANESTHESIA POSTPROCEDURE EVALUATION
Patient: Alen Rosales    Procedure Summary     Date: 07/28/22 Room / Location: [facility] OR  /  [facility]    Anesthesia Start: 0912 Anesthesia Stop:     Procedure: Right Knee diagnostic arthroscopy with partial lateral meniscectomy, two compartment chondroplasty. (Right ) Diagnosis:       Complex tear of lateral meniscus of right knee as current injury, initial encounter      Acute pain of right knee      Sprain of right knee, unspecified ligament, initial encounter      (Complex tear of lateral meniscus of right knee as current injury, initial encounter [S83.271A])      (Acute pain of right knee [M25.561])      (Sprain of right knee, unspecified ligament, initial encounter [S83.91XA])    Surgeons: Bernard Barber MD Provider: Gregory Patrick CRNA    Anesthesia Type: general ASA Status: 3          Anesthesia Type: general    Vitals  Vitals Value Taken Time   /68 07/28/22 1110   Temp 97.2 °F (36.2 °C) 07/28/22 1110   Pulse 58 07/28/22 1118   Resp 16 07/28/22 1110   SpO2 100 % 07/28/22 1118   Vitals shown include unvalidated device data.        Post Anesthesia Care and Evaluation    Patient location during evaluation: PACU  Patient participation: complete - patient participated  Level of consciousness: awake and alert  Pain score: 2  Pain management: adequate    Airway patency: patent  Anesthetic complications: No anesthetic complications  PONV Status: none  Cardiovascular status: acceptable  Respiratory status: acceptable  Hydration status: acceptable  No anesthesia care post op

## 2022-08-11 ENCOUNTER — OFFICE VISIT (OUTPATIENT)
Dept: ORTHOPEDIC SURGERY | Facility: CLINIC | Age: 37
End: 2022-08-11

## 2022-08-11 VITALS — TEMPERATURE: 98 F | BODY MASS INDEX: 35.22 KG/M2 | HEIGHT: 70 IN | WEIGHT: 246 LBS

## 2022-08-11 DIAGNOSIS — S83.511A RUPTURE OF ANTERIOR CRUCIATE LIGAMENT OF RIGHT KNEE, INITIAL ENCOUNTER: ICD-10-CM

## 2022-08-11 DIAGNOSIS — Z98.890 STATUS POST ARTHROSCOPY OF KNEE: ICD-10-CM

## 2022-08-11 DIAGNOSIS — S83.271A COMPLEX TEAR OF LATERAL MENISCUS OF RIGHT KNEE AS CURRENT INJURY, INITIAL ENCOUNTER: Primary | ICD-10-CM

## 2022-08-11 PROCEDURE — 99024 POSTOP FOLLOW-UP VISIT: CPT | Performed by: PHYSICIAN ASSISTANT

## 2022-08-11 NOTE — PROGRESS NOTES
Subjective   Patient ID: Alen Rosales is a 37 y.o. right hand dominant male is here today for a post-operative visit.  Post-op of the Right Knee (Status post diagnostic ATS, partial lateral menisectomy and two compartment chondroplasty 7/28/2022.)          CHIEF COMPLAINT:    Post op right knee ATS    History of Present Illness      Pain controlled: [] no   [x] yes   Medication refill requested: [x] no   [] yes    Patient compliant with instructions: [] no   [x] yes   Other: Reports good progress since surgery.  Patient denies CP or SOA  He has noticed improvement since the knee surgery.      Past Medical History:   Diagnosis Date   • Anxiety    • Asthma     AS CHILD   • Dyspepsia    • Dyspnea on exertion    • Elevated LDL cholesterol level    • Extensive tattoos    • Fatigue    • Morbid obesity (HCC)         Past Surgical History:   Procedure Laterality Date   • DENTAL PROCEDURE      tooth pulled    • ENDOSCOPY N/A 12/11/2020    Procedure: ESOPHAGOGASTRODUODENOSCOPY;  Surgeon: Vince Jean MD;  Location:  [facility];  Service: Bariatric;  Laterality: N/A;   • GASTRIC SLEEVE LAPAROSCOPIC N/A 12/11/2020    Procedure: GASTRIC SLEEVE LAPAROSCOPIC;  Surgeon: Vince Jean MD;  Location:  [facility];  Service: Bariatric;  Laterality: N/A;   • HIATAL HERNIA REPAIR N/A 12/11/2020    Procedure: HIATAL HERNIA REPAIR LAPAROSCOPIC;  Surgeon: Vince Jean MD;  Location:  [facility];  Service: Bariatric;  Laterality: N/A;   • KNEE ARTHROSCOPY W/ MENISCECTOMY Right 7/28/2022    Procedure: Right Knee diagnostic arthroscopy with partial lateral meniscectomy, two compartment chondroplasty.;  Surgeon: Bernard Barber MD;  Location:  [facility];  Service: Orthopedics;  Laterality: Right;       No Known Allergies    Review of Systems   Constitutional: Negative for fever.   HENT: Negative for dental problem and voice change.    Eyes: Negative for visual disturbance.   Respiratory: Negative for shortness of breath.   "  Cardiovascular: Negative for chest pain.   Gastrointestinal: Negative for abdominal pain.   Genitourinary: Negative for dysuria.   Musculoskeletal: Positive for arthralgias (right knee). Negative for gait problem and joint swelling.   Skin: Negative for rash.   Neurological: Negative for speech difficulty.   Hematological: Does not bruise/bleed easily.   Psychiatric/Behavioral: Negative for confusion.     I have reviewed the medical and surgical history, family history, social history, medications, and/or allergies, and the review of systems of this report.    Objective   Temp 98 °F (36.7 °C)   Ht 177.8 cm (70\")   Wt 112 kg (246 lb)   BMI 35.30 kg/m²       Signs of infection: [x] no                    [] yes   Drainage: [x] no                    [] yes   Incision: [x] healing well     []healed well   Motor exam intact: [] no                    [x] yes   Neurovascular exam intact: [] no                    [x] yes   Signs of compartment syndrome: [x] no                    [] yes   Signs of DVT: [x] no                    [] yes   Other:      Physical Exam  Ortho Exam    Extremity DVT signs are negative on physical exam with negative Leonidas sign, no calf pain, no palpable cords and no skin tone change  Neurologic Exam    Assessment & Plan     Independent Review of Radiographic Studies:    No new imaging done today.    Laboratory and Other Studies:  No new results reviewed today.     Medical Decision Making:    Stable neurovascular exam.     Procedures     Diagnoses and all orders for this visit:    1. Complex tear of lateral meniscus of right knee as current injury, initial encounter (Primary)  -     Cancel: Ambulatory Referral to Physical Therapy POST OP  -     Ambulatory Referral to Physical Therapy Evaluate and treat, Ortho; Electrotherapy, Heat; Strengthening, ROM    2. Status post arthroscopy of knee  -     Cancel: Ambulatory Referral to Physical Therapy POST OP  -     Ambulatory Referral to Physical Therapy " Evaluate and treat, Ortho; Electrotherapy, Heat; Strengthening, ROM    3. Rupture of anterior cruciate ligament of right knee, initial encounter  -     Ambulatory Referral to Physical Therapy Evaluate and treat, Ortho; Electrotherapy, Heat; Strengthening, ROM         Recommendations/Plan:     Sutures Staples or Pins [x] Removed today  [] At prior visit  [] Plan removal later   Physical therapy: []rehab facility  [x]outpatient referral  [] therapy ongoing   Ultrasound: [x]not ordered         []order given to patient   Labs: [x]not ordered         []order given to patient   Weight Bearing status: []Full [x]WBAT []PWB []NWB []Other     Discussion of orthopaedic goals and activities and patient and/or guardian expressed appreciation.  Regular exercise as tolerated  Guided on proper techniques for mobility, strength, agility and/or conditioning exercises  Weight bearing parameters reviewed  Take prescribed medications as instructed only as tolerated  Enroll in PT  Follow up in 12 weeks  Due to partial ACl tear- may wear a brace in future with certain activity.  Avoid ladder climbing and high impact activity/excercises   Patient is encouraged and agreeable to call or return sooner for any issues or concerns.

## 2022-08-23 ENCOUNTER — TREATMENT (OUTPATIENT)
Dept: PHYSICAL THERAPY | Facility: CLINIC | Age: 37
End: 2022-08-23

## 2022-08-23 DIAGNOSIS — G89.29 CHRONIC PAIN OF RIGHT KNEE: Primary | ICD-10-CM

## 2022-08-23 DIAGNOSIS — Z98.890 STATUS POST MENISCECTOMY: ICD-10-CM

## 2022-08-23 DIAGNOSIS — M25.561 CHRONIC PAIN OF RIGHT KNEE: Primary | ICD-10-CM

## 2022-08-23 PROCEDURE — 97110 THERAPEUTIC EXERCISES: CPT | Performed by: PHYSICAL THERAPIST

## 2022-08-23 PROCEDURE — 97161 PT EVAL LOW COMPLEX 20 MIN: CPT | Performed by: PHYSICAL THERAPIST

## 2022-08-23 PROCEDURE — 97140 MANUAL THERAPY 1/> REGIONS: CPT | Performed by: PHYSICAL THERAPIST

## 2022-08-23 NOTE — PROGRESS NOTES
Physical Therapy Initial Evaluation and Plan of Care      Patient: Alen Rosales   : 1985  Diagnosis/ICD-10 Code:  Chronic pain of right knee [M25.561, G89.29]  Referring practitioner: FERNANDO Jennings*    Subjective Evaluation    History of Present Illness  Date of onset: 2022  Date of surgery: 2022  Mechanism of injury: Pt reports tearing R meniscus in last year and then re-injuring in  and having surgery about 2 weeks ago to remove the torn meniscus. Pt reports that he has to walk a lot at work and lift heavy items. Pt reports that he has trouble going down stairs and some pain when pushing his leg straight.       Patient Occupation: [employer]  Current pain ratin  At best pain ratin  Quality: sharp  Aggravating factors: stairs and movement  Progression: improved    Social Support  Lives in: one-story house  Lives with: spouse    Diagnostic Tests  No diagnostic tests performed    Patient Goals  Patient goals for therapy: decreased pain, increased motion, increased strength and return to work             Objective          Active Range of Motion     Right Knee   Flexion: 140 degrees   Extension: 0 degrees with pain    Patellar Mobility     Right Knee Patellar tendons within functional limits include the medial, lateral, superior and inferior.     Strength/Myotome Testing     Left Knee   Quadriceps contraction: good    Right Knee   Quadriceps contraction: fair    Ambulation     Observational Gait   Gait: within functional limits   Walking speed and stride length within functional limits.     Functional Assessment     Comments  Pt able to perform 1/2 squat without pain in the R knee without UE support.     Pt able to perform full squat with UE support.     Pt with no pain and independent without UE support ascending and descending stairs.             Assessment & Plan     Assessment  Impairments: activity intolerance, impaired balance, impaired physical strength,  lacks appropriate home exercise program and pain with function  Functional Limitations: lifting, walking, uncomfortable because of pain, standing and stooping  Assessment details: Patient is a 37 year old male who comes to physical therapy s/p meniscectomy of the R medial meniscus. Signs and symptoms are consistent with s/p arthroscopic surgery resulting in pain, decreased ROM, decreased strength, and inability to perform all essential functional activities. Pt will benefit from skilled PT services to address the above issues.     Prognosis: good    Goals  Plan Goals: SHORT TERM GOALS:  2 weeks       1. Pt independent with HEP  2. Pt to demonstrate saman hip strength 4/5 or greater to improve stability with ambulation  3. Pt to report being able to walk for 10 minutes without increasing pain in the right knee    LONG TERM GOALS:   6 weeks  1. Pt to demonstrate ability to perform full functional squat with good form and control of the knees and without increasing pain  2. Pt to demonstrate saman hip strength to 4+/5 or greater to improve safety with ambulation on uneven surfaces  3. Pt to return to work full duty without increased pain in the right knee   4. Pt to demonstrate ability to perform step up/down 12 inch step x10 safely and without pain in the right knee       Plan  Therapy options: will be seen for skilled therapy services  Planned modality interventions: cryotherapy and electrical stimulation/Russian stimulation  Planned therapy interventions: balance/weight-bearing training, flexibility, functional ROM exercises, home exercise program, joint mobilization, manual therapy, motor coordination training, neuromuscular re-education, soft tissue mobilization, spinal/joint mobilization, strengthening, stretching and therapeutic activities  Frequency: 2x week  Duration in weeks: 4  Treatment plan discussed with: patient        Manual Therapy:    10     mins  04058;  Therapeutic Exercise:    12     mins  74614;      Neuromuscular Edilberto:        mins  18020;    Therapeutic Activity:          mins  75232;     Gait Training:           mins  90144;     Ultrasound:          mins  26565;    Electrical Stimulation:         mins  15072 ( );  Dry Needling          mins self-pay    Timed Treatment:   22   mins   Total Treatment:     45   mins    PT SIGNATURE: TRE Garcia License: [id number]  DATE TREATMENT INITIATED: 8/23/2022    Initial Certification  Certification Period: 11/20/2022  I certify that the therapy services are furnished while this patient is under my care.  The services outlined above are required by this patient, and will be reviewed every 90 days.     PHYSICIAN: Hubert Castorena PA-C      DATE:     Please sign and return via fax to [phone].. Thank you, [facility].

## 2022-08-30 ENCOUNTER — TREATMENT (OUTPATIENT)
Dept: PHYSICAL THERAPY | Facility: CLINIC | Age: 37
End: 2022-08-30

## 2022-08-30 DIAGNOSIS — G89.29 CHRONIC PAIN OF RIGHT KNEE: Primary | ICD-10-CM

## 2022-08-30 DIAGNOSIS — Z98.890 STATUS POST MENISCECTOMY: ICD-10-CM

## 2022-08-30 DIAGNOSIS — M25.561 CHRONIC PAIN OF RIGHT KNEE: Primary | ICD-10-CM

## 2022-08-30 PROCEDURE — 97112 NEUROMUSCULAR REEDUCATION: CPT | Performed by: PHYSICAL THERAPIST

## 2022-08-30 PROCEDURE — 97140 MANUAL THERAPY 1/> REGIONS: CPT | Performed by: PHYSICAL THERAPIST

## 2022-08-30 PROCEDURE — 97110 THERAPEUTIC EXERCISES: CPT | Performed by: PHYSICAL THERAPIST

## 2022-08-30 NOTE — PROGRESS NOTES
Physical Therapy Daily Treatment Note      Visit #: 2    Alen Rosales reports that his knee feels great today and that he has no new concerns or changes. He stated he is surprised how well his knee has been recovering. Patient reports he is doing better going down steps.         Objective Pt present to PT today with no distress at rest    Pt completed all activities without an increase in knee pain. Patient handled progression of added exercises well.       See Exercise, Manual, and Modality Logs for complete treatment.     Assessment/Plan Pt to continue POC with progression as indicated. PT will continue to assist in decreasing pain levels and improve mobility in order for the pt to return to normalcy in his daily function.       Progress per Plan of Care      Visit Diagnosis:    ICD-10-CM ICD-9-CM   1. Chronic pain of right knee  M25.561 719.46    G89.29 338.29   2. Status post meniscectomy  Z98.890 V45.89            Manual Therapy:    10     mins  29237;  Therapeutic Exercise:    20     mins  75029;     Neuromuscular Edilberto:    13    mins  06049;    Therapeutic Activity:         mins  50782;     Gait Training:           mins  19899;     Ultrasound:          mins  61651;    Electrical Stimulation:         mins  13599 ( );  Dry Needling          mins self-pay  Iontophoresis          mins [id number]      Timed Treatment:   43   mins   Total Treatment:     43   mins    Josiah Long, PT  Physical Therapist

## 2022-09-01 ENCOUNTER — TREATMENT (OUTPATIENT)
Dept: PHYSICAL THERAPY | Facility: CLINIC | Age: 37
End: 2022-09-01

## 2022-09-01 DIAGNOSIS — Z98.890 STATUS POST MENISCECTOMY: ICD-10-CM

## 2022-09-01 DIAGNOSIS — G89.29 CHRONIC PAIN OF RIGHT KNEE: Primary | ICD-10-CM

## 2022-09-01 DIAGNOSIS — M25.561 CHRONIC PAIN OF RIGHT KNEE: Primary | ICD-10-CM

## 2022-09-01 PROCEDURE — 97112 NEUROMUSCULAR REEDUCATION: CPT | Performed by: PHYSICAL THERAPIST

## 2022-09-01 PROCEDURE — 97530 THERAPEUTIC ACTIVITIES: CPT | Performed by: PHYSICAL THERAPIST

## 2022-09-01 PROCEDURE — 97110 THERAPEUTIC EXERCISES: CPT | Performed by: PHYSICAL THERAPIST

## 2022-09-01 PROCEDURE — 97140 MANUAL THERAPY 1/> REGIONS: CPT | Performed by: PHYSICAL THERAPIST

## 2022-09-01 NOTE — PROGRESS NOTES
Physical Therapy Daily Treatment Note      Visit #: 3    Alen Rosales reports 0/10 pain today at rest.     Pt reports he had no issues following last session.         Objective Pt present to PT today with no distress at rest    Pt tolerated progression well with added exercises and increased time.     Pt reported muscle fatigue during lateral walking but did not have increased knee pain.     Pt had mild discomfort in right knee during eccentric step down that was relieved by decreasing the height of the step.       See Exercise, Manual, and Modality Logs for complete treatment.     Assessment/Plan  Pt to continue POC with progression as indicated. PT will continue to assist in gaining strength and decrease pain levels in right knee with daily activities and in order to return to work.       Progress per Plan of Care      Visit Diagnosis:    ICD-10-CM ICD-9-CM   1. Chronic pain of right knee  M25.561 719.46    G89.29 338.29   2. Status post meniscectomy  Z98.890 V45.89            Manual Therapy:    13     mins  31561;  Therapeutic Exercise:    17     mins  54439;     Neuromuscular Edilberto:   10     mins  32163;    Therapeutic Activity:     13     mins  46648;     Gait Training:           mins  65607;     Ultrasound:          mins  97429;    Electrical Stimulation:         mins  38935 ( );  Dry Needling          mins self-pay  Iontophoresis          mins [id number]      Timed Treatment:   53   mins   Total Treatment:     53   mins    Josiah Long, PT  Physical Therapist

## 2022-09-06 ENCOUNTER — TREATMENT (OUTPATIENT)
Dept: PHYSICAL THERAPY | Facility: CLINIC | Age: 37
End: 2022-09-06

## 2022-09-06 ENCOUNTER — TELEPHONE (OUTPATIENT)
Dept: ORTHOPEDIC SURGERY | Facility: CLINIC | Age: 37
End: 2022-09-06

## 2022-09-06 DIAGNOSIS — M25.561 CHRONIC PAIN OF RIGHT KNEE: Primary | ICD-10-CM

## 2022-09-06 DIAGNOSIS — Z98.890 STATUS POST MENISCECTOMY: ICD-10-CM

## 2022-09-06 DIAGNOSIS — G89.29 CHRONIC PAIN OF RIGHT KNEE: Primary | ICD-10-CM

## 2022-09-06 PROCEDURE — 97140 MANUAL THERAPY 1/> REGIONS: CPT | Performed by: PHYSICAL THERAPIST

## 2022-09-06 PROCEDURE — 97530 THERAPEUTIC ACTIVITIES: CPT | Performed by: PHYSICAL THERAPIST

## 2022-09-06 PROCEDURE — 97112 NEUROMUSCULAR REEDUCATION: CPT | Performed by: PHYSICAL THERAPIST

## 2022-09-06 NOTE — PROGRESS NOTES
Physical Therapy Daily Treatment Note      Visit #: 4    Alen Rosales reports 0/10 pain today at rest. Patient reports he fell asleep on the couch over the weekend and when he woke up he had some stiffness in his right knee that went away with movement.        Objective Pt present to PT today with no distress at rest.    Pt had no difficulty with eccentric step down height today.     Pt was able to complete squats today without pain.     Pt tolerated progression well without increase in pain.       See Exercise, Manual, and Modality Logs for complete treatment.     Assessment/Plan  Patient to continue POC with progression as indicated. Pt will progress activities next week to begin working towards return to full duty at work. PT will continue to assist in decreasing right knee pain and increase strength to return to normalcy in daily function.       Progress per Plan of Care      Visit Diagnosis:    ICD-10-CM ICD-9-CM   1. Chronic pain of right knee  M25.561 719.46    G89.29 338.29   2. Status post meniscectomy  Z98.890 V45.89            Manual Therapy:   13      mins  74169;  Therapeutic Exercise:         mins  92530;     Neuromuscular Edilberto:   13     mins  74654;    Therapeutic Activity:     17     mins  90852;     Gait Training:           mins  74112;     Ultrasound:          mins  22241;    Electrical Stimulation:         mins  35484 ( );  Dry Needling          mins self-pay  Iontophoresis          mins 62773      Timed Treatment:  43    mins   Total Treatment:    43    mins    Josiah Long, PT  Physical Therapist

## 2022-09-08 ENCOUNTER — TREATMENT (OUTPATIENT)
Dept: PHYSICAL THERAPY | Facility: CLINIC | Age: 37
End: 2022-09-08

## 2022-09-08 DIAGNOSIS — G89.29 CHRONIC PAIN OF RIGHT KNEE: Primary | ICD-10-CM

## 2022-09-08 DIAGNOSIS — M25.561 CHRONIC PAIN OF RIGHT KNEE: Primary | ICD-10-CM

## 2022-09-08 DIAGNOSIS — Z98.890 STATUS POST MENISCECTOMY: ICD-10-CM

## 2022-09-08 PROCEDURE — 97110 THERAPEUTIC EXERCISES: CPT | Performed by: PHYSICAL THERAPIST

## 2022-09-08 PROCEDURE — 97530 THERAPEUTIC ACTIVITIES: CPT | Performed by: PHYSICAL THERAPIST

## 2022-09-08 PROCEDURE — 97112 NEUROMUSCULAR REEDUCATION: CPT | Performed by: PHYSICAL THERAPIST

## 2022-09-08 PROCEDURE — 97140 MANUAL THERAPY 1/> REGIONS: CPT | Performed by: PHYSICAL THERAPIST

## 2022-09-08 NOTE — PROGRESS NOTES
Physical Therapy Daily Treatment Note      Visit #: 5    Alen Rosales reports 0/10 pain today at rest. Patient reports muscle soreness after last visit but no new issues.     Patient reports plans to return to work next week.     Objective Pt present to PT today with no distress at rest.     Patient demonstrated improved balance with SL ball toss today with minimal loss of balance.    Patient completed all exercises and progression without complaint of pain or fatigue.     See Exercise, Manual, and Modality Logs for complete treatment.     Assessment/Plan  Patient to continue with POC with progression as indicated. PT will continue to increase strength, decrease pain in right knee, and assist in return to normalcy in daily function and work. Patient will be seen next week and questioned on how return to work went.       Progress per Plan of Care      Visit Diagnosis:    ICD-10-CM ICD-9-CM   1. Chronic pain of right knee  M25.561 719.46    G89.29 338.29   2. Status post meniscectomy  Z98.890 V45.89            Manual Therapy:   14      mins  86331;  Therapeutic Exercise:      15   mins  53892;     Neuromuscular Edilberto:   14     mins  26712;    Therapeutic Activity:     18     mins  37430;     Gait Training:           mins  63950;     Ultrasound:          mins  61097;    Electrical Stimulation:         mins  55003 ( );  Dry Needling          mins self-pay  Iontophoresis          mins 13287      Timed Treatment:    61  mins   Total Treatment:     61   mins    Josiah Long, PT  Physical Therapist

## 2022-09-09 ENCOUNTER — OFFICE VISIT (OUTPATIENT)
Dept: ORTHOPEDIC SURGERY | Facility: CLINIC | Age: 37
End: 2022-09-09

## 2022-09-09 VITALS — TEMPERATURE: 97.8 F | HEIGHT: 70 IN | WEIGHT: 248.8 LBS | BODY MASS INDEX: 35.62 KG/M2

## 2022-09-09 DIAGNOSIS — Z98.890 STATUS POST ARTHROSCOPY OF KNEE: Primary | ICD-10-CM

## 2022-09-09 PROCEDURE — 99024 POSTOP FOLLOW-UP VISIT: CPT | Performed by: PHYSICIAN ASSISTANT

## 2022-09-09 NOTE — PROGRESS NOTES
Subjective   Patient ID: Alen Rosales is a 37 y.o. right hand dominant male is here today for a post-operative visit.  Post-op of the Right Knee (Status post diagnostic ATS, partial lateral menisectomy and two compartment chondroplasty 7/28/2022. He is here today to be evaluated to go back to work with no restrictions. Denies pain.)          CHIEF COMPLAINT:    Post op right knee ATS  History of Present Illness      Pain controlled: [] no   [x] yes   Medication refill requested: [x] no   [] yes    Patient compliant with instructions: [] no   [x] yes   Other: Reports excellent progress since surgery.  Patient is very pleased with his progress.  He is enrolled in physical therapy and would like to return to work without restrictions on 9/13/2022.     Past Medical History:   Diagnosis Date   • Anxiety    • Asthma     AS CHILD   • Dyspepsia    • Dyspnea on exertion    • Elevated LDL cholesterol level    • Extensive tattoos    • Fatigue    • Morbid obesity (HCC)         Past Surgical History:   Procedure Laterality Date   • DENTAL PROCEDURE      tooth pulled    • ENDOSCOPY N/A 12/11/2020    Procedure: ESOPHAGOGASTRODUODENOSCOPY;  Surgeon: Vince Jean MD;  Location:  [facility];  Service: Bariatric;  Laterality: N/A;   • GASTRIC SLEEVE LAPAROSCOPIC N/A 12/11/2020    Procedure: GASTRIC SLEEVE LAPAROSCOPIC;  Surgeon: Vince Jean MD;  Location:  [facility];  Service: Bariatric;  Laterality: N/A;   • HIATAL HERNIA REPAIR N/A 12/11/2020    Procedure: HIATAL HERNIA REPAIR LAPAROSCOPIC;  Surgeon: Vince Jean MD;  Location:  [facility];  Service: Bariatric;  Laterality: N/A;   • KNEE ARTHROSCOPY W/ MENISCECTOMY Right 7/28/2022    Procedure: Right Knee diagnostic arthroscopy with partial lateral meniscectomy, two compartment chondroplasty.;  Surgeon: Bernard Barber MD;  Location:  [facility];  Service: Orthopedics;  Laterality: Right;       No Known Allergies    Review of Systems   Constitutional: Negative  "for fever.   HENT: Negative for dental problem and voice change.    Eyes: Negative for visual disturbance.   Respiratory: Negative for shortness of breath.    Cardiovascular: Negative for chest pain.   Gastrointestinal: Negative for abdominal pain.   Genitourinary: Negative for dysuria.   Musculoskeletal: Negative for arthralgias, gait problem and joint swelling.   Skin: Negative for rash.   Neurological: Negative for speech difficulty.   Hematological: Does not bruise/bleed easily.   Psychiatric/Behavioral: Negative for confusion.     I have reviewed the medical and surgical history, family history, social history, medications, and/or allergies, and the review of systems of this report.    Objective   Temp 97.8 °F (36.6 °C)   Ht 177.8 cm (70\")   Wt 113 kg (248 lb 12.8 oz)   BMI 35.70 kg/m²       Signs of infection: [x] no                    [] yes   Drainage: [x] no                    [] yes   Incision: [x] healing well     []healed well   Motor exam intact: [] no                    [x] yes   Neurovascular exam intact: [] no                    [x] yes   Signs of compartment syndrome: [x] no                    [] yes   Signs of DVT: [x] no                    [] yes   Other:      Physical Exam  Musculoskeletal:      Right knee:      Instability Tests: Medial Remy test negative and lateral Remy test negative.       Right Knee Exam     Range of Motion   Extension: 0   Flexion: 130     Tests   Remy:  Medial - negative Lateral - negative  Lachman:  Anterior - negative      Drawer:  Anterior - negative                Extremity DVT signs are negative on physical exam with negative Leonidas sign, no calf pain, no palpable cords and no skin tone change  Neurologic Exam    Assessment & Plan     Independent Review of Radiographic Studies:    No new imaging done today.    Laboratory and Other Studies:  No new results reviewed today.     Medical Decision Making:    Stable neurovascular exam.     Procedures   "   Diagnoses and all orders for this visit:    1. Status post arthroscopy of knee (Primary)         Recommendations/Plan:     Sutures Staples or Pins [] Removed today  [x] At prior visit  [] Plan removal later   Physical therapy: []rehab facility  []outpatient referral  [x] therapy ongoing   Ultrasound: [x]not ordered         []order given to patient   Labs: [x]not ordered         []order given to patient   Weight Bearing status: []Full [x]WBAT []PWB []NWB []Other     Discussion of orthopaedic goals and activities and patient and/or guardian expressed appreciation.  Regular exercise as tolerated  Guided on proper techniques for mobility, strength, agility and/or conditioning exercises  Weight bearing parameters reviewed  Take prescribed medications as instructed only as tolerated     Follow up PRN  Patient may want to use a brace when performing exercises or physical activity  Patient is encouraged and agreeable to call or return sooner for any issues or concerns.

## 2022-09-15 ENCOUNTER — TREATMENT (OUTPATIENT)
Dept: PHYSICAL THERAPY | Facility: CLINIC | Age: 37
End: 2022-09-15

## 2022-09-15 DIAGNOSIS — M25.561 CHRONIC PAIN OF RIGHT KNEE: Primary | ICD-10-CM

## 2022-09-15 DIAGNOSIS — G89.29 CHRONIC PAIN OF RIGHT KNEE: Primary | ICD-10-CM

## 2022-09-15 DIAGNOSIS — Z98.890 STATUS POST MENISCECTOMY: ICD-10-CM

## 2022-09-15 PROCEDURE — 97112 NEUROMUSCULAR REEDUCATION: CPT | Performed by: PHYSICAL THERAPIST

## 2022-09-15 PROCEDURE — 97530 THERAPEUTIC ACTIVITIES: CPT | Performed by: PHYSICAL THERAPIST

## 2022-09-15 PROCEDURE — 97110 THERAPEUTIC EXERCISES: CPT | Performed by: PHYSICAL THERAPIST

## 2022-09-15 NOTE — PROGRESS NOTES
Physical Therapy Daily Treatment Note      Visit #: 6    Alen Rosales reports 0/10 pain today at rest. Pt reported going back to work went well and his knee didn't effect his duties at work at all. Pt reported after his first day back to work his knee was slightly stiff but had no pain.         Objective Pt present to PT today with no distress at rest    Pt completed all exercises with progression without complaint of pain    Pt was observed on stairs and doing a squat with proper form and reported no pain    See Exercise, Manual, and Modality Logs for complete treatment.     Assessment/Plan  Pt will be held until next week. Pt instructed to call clinic Monday and report if he needs to be seen or is ready for discharge.      Progress per Plan of Care      Visit Diagnosis:    ICD-10-CM ICD-9-CM   1. Chronic pain of right knee  M25.561 719.46    G89.29 338.29   2. Status post meniscectomy  Z98.890 V45.89            Manual Therapy:         mins  64567;  Therapeutic Exercise:   14      mins  51885;     Neuromuscular Edilberto:  14      mins  76082;    Therapeutic Activity:     29     mins  44568;     Gait Training:           mins  50957;     Ultrasound:          mins  69817;    Electrical Stimulation:         mins  32943 ( );  Dry Needling          mins self-pay  Iontophoresis          mins 72579      Timed Treatment:   57   mins   Total Treatment:     57   mins    Josiah Long, PT  Physical Therapist

## 2024-07-18 DIAGNOSIS — M25.562 ARTHRALGIA OF LEFT KNEE: Primary | ICD-10-CM

## 2024-07-19 ENCOUNTER — OFFICE VISIT (OUTPATIENT)
Dept: ORTHOPEDIC SURGERY | Facility: CLINIC | Age: 39
End: 2024-07-19
Payer: COMMERCIAL

## 2024-07-19 DIAGNOSIS — M22.8X2 PATELLAR MALTRACKING, LEFT: Primary | ICD-10-CM

## 2024-07-19 PROCEDURE — 99213 OFFICE O/P EST LOW 20 MIN: CPT | Performed by: STUDENT IN AN ORGANIZED HEALTH CARE EDUCATION/TRAINING PROGRAM

## 2024-07-22 NOTE — PROGRESS NOTES
Office Note     Name: Alen Rosales    : 1985     MRN: [id number]     Chief Complaint  Bilateral knee pain    Subjective     History of Present Illness:  Alen Rosales is a 39 y.o. male presenting today for evaluation of bilateral knee pain, increased pain in the past month or so and mild instability with certain movements in the left knee.  States that symptoms are primarily in the patellofemoral area and exacerbated by activity such as stairs and rising from a seated position.  Has history of left knee dislocated patella approximately 7 years ago, did physical therapy and no surgery and symptoms improved.  Also has history of right knee medial meniscus tear.  He states his right knee has the same symptoms as the left but much more mild.  Patient also acknowledges his obesity and knows that this is contributing to his knee problems.    Review of Systems     Past Medical History:   Diagnosis Date    Anxiety     Asthma     AS CHILD    Dyspepsia     Dyspnea on exertion     Elevated LDL cholesterol level     Extensive tattoos     Fatigue     Morbid obesity         Past Surgical History:   Procedure Laterality Date    DENTAL PROCEDURE      tooth pulled     ENDOSCOPY N/A 2020    Procedure: ESOPHAGOGASTRODUODENOSCOPY;  Surgeon: Vince Jean MD;  Location:  [facility];  Service: Bariatric;  Laterality: N/A;    GASTRIC SLEEVE LAPAROSCOPIC N/A 2020    Procedure: GASTRIC SLEEVE LAPAROSCOPIC;  Surgeon: Vince Jean MD;  Location:  [facility];  Service: Bariatric;  Laterality: N/A;    HIATAL HERNIA REPAIR N/A 2020    Procedure: HIATAL HERNIA REPAIR LAPAROSCOPIC;  Surgeon: Vince Jean MD;  Location:  [facility];  Service: Bariatric;  Laterality: N/A;    KNEE ARTHROSCOPY W/ MENISCECTOMY Right 2022    Procedure: Right Knee diagnostic arthroscopy with partial lateral meniscectomy, two compartment chondroplasty.;  Surgeon: Bernard Barber MD;  Location:  [facility];  Service:  Orthopedics;  Laterality: Right;       Family History   Problem Relation Age of Onset    Hypertension Mother     Diabetes Mother     Obesity Mother     Sleep apnea Mother     Coronary artery disease Father     Hyperlipidemia Father     Hypertension Father     Heart attack Father     Heart disease Father     Drug abuse Brother     Hypertension Brother     COPD Maternal Grandmother     Obesity Maternal Grandmother     Diabetes Maternal Grandmother     Hypertension Maternal Grandmother     Heart disease Maternal Grandmother     Heart attack Maternal Grandmother     Kidney cancer Maternal Grandmother     Cancer Maternal Grandmother         Bladder Cancer    Hypertension Maternal Grandfather     Stroke Maternal Grandfather     Stroke Paternal Grandmother     Hypertension Paternal Grandmother     Stroke Paternal Grandfather     Hypertension Paternal Grandfather     Diabetes Paternal Grandfather        Social History     Socioeconomic History    Marital status:    Tobacco Use    Smoking status: Never     Passive exposure: Never    Smokeless tobacco: Never   Vaping Use    Vaping status: Never Used   Substance and Sexual Activity    Alcohol use: Yes     Alcohol/week: 1.0 standard drink of alcohol     Types: 1 Shots of liquor per week     Comment: socially    Drug use: Never    Sexual activity: Defer         Current Outpatient Medications:     Calcium Carbonate (CALCIUM 500 PO), Take 1 tablet by mouth Daily., Disp: , Rfl:     cholecalciferol (VITAMIN D3) 25 MCG (1000 UT) tablet, Take 1,000 Units by mouth Daily., Disp: , Rfl:     diclofenac (VOLTAREN) 75 MG EC tablet, Take 1 tablet by mouth 2 (Two) Times a Day. Do not crush, split, or chew, Disp: 60 tablet, Rfl: 1    Ferrous Sulfate (IRON PO), Take 1 tablet by mouth Daily., Disp: , Rfl:     multivitamin with minerals tablet tablet, Take 1 tablet by mouth Daily., Disp: , Rfl:     promethazine-dextromethorphan (PROMETHAZINE-DM) 6.25-15 MG/5ML syrup, Take 5 mL by mouth 4  (Four) Times a Day As Needed for Cough., Disp: 118 mL, Rfl: 0    propranolol (INDERAL) 10 MG tablet, Take 1 tablet by mouth 3 (Three) Times a Day As Needed (anxiety)., Disp: 90 tablet, Rfl: 0    No Known Allergies        Objective   There were no vitals taken for this visit.   BMI is >= 30 and <35. (Class 1 Obesity). The following options were offered after discussion;: weight loss educational material (shared in after visit summary)       Physical Exam  Musculoskeletal:      Right knee: No effusion.      Instability Tests: Medial Remy test negative and lateral Remy test negative.      Left knee: No effusion.      Instability Tests: Medial Remy test negative and lateral Remy test negative.       Right Knee Exam     Muscle Strength   The patient has normal right knee strength.    Tenderness   The patient is experiencing tenderness in the patella.    Range of Motion   The patient has normal right knee ROM.    Tests   Remy:  Medial - negative Lateral - negative  Varus: negative Valgus: negative  Lachman:  Anterior - negative      Drawer:  Anterior - negative      Patellar apprehension: negative    Other   Erythema: absent  Sensation: normal  Pulse: present  Swelling: none  Effusion: no effusion present    Comments:  Negative patellar grind test bilaterally.      Left Knee Exam     Muscle Strength   The patient has normal left knee strength.    Tenderness   The patient is experiencing tenderness in the patella.    Range of Motion   The patient has normal left knee ROM.    Tests   Remy:  Medial - negative Lateral - negative  Varus: negative Valgus: negative  Lachman:  Anterior - negative    Posterior - negative  Drawer:  Anterior - negative     Posterior - negative  Patellar apprehension: negative    Other   Erythema: absent  Sensation: normal  Pulse: present  Swelling: none  Effusion: no effusion present           Extremity DVT signs are negative by clinical screen.     Independent Review of  Radiographic Studies:    Three-view plain films of the left knee including a bilateral AP sunrise and bilateral AP weightbearing view were done in office today for the evaluation of pain and joint condition, no comparison views available.  There were no acute osseous abnormalities noted.  The medial lateral and patellofemoral compartments are well-preserved with no joint space narrowing or osteophyte formation.  Bilateral AP sunrise view does reveal moderate lateral patellar tilt bilaterally.    Procedures    Assessment and Plan   Diagnoses and all orders for this visit:    1. Patellar maltracking, left (Primary)  -     Ambulatory Referral to Physical Therapy       Discussion of orthopedic goals  Orthopedic activities reviewed and patient expressed appreciation  Regular exercise as tolerated  Risk, benefits, and merits of treatment alternatives reviewed with the patient and questions answered  Patient guided on mobility and conditioning exercises  Ice, heat, and/or modalities as beneficial  Elevate leg for residual swelling  Physical therapy referral given  Reduced physical activity as appropriate and avoid offending activity  Counseling on diet, nutrition, fitness exercise, and weight reduction goals    Recommendations/Plan:  Exercise, medications, injections, other patient advice, and return appointment as noted.  Referral: Physical and Occupational Therapy referral.  Patient is encouraged to call or return for any issues or concerns.    Patient given referral to physical therapy for left knee rehabilitation and quadricep strengthening.  Advised RICE and over-the-counter analgesics as needed for pain.  If symptoms worsen consider cortisone injection.  Patient offered MacConnell brace though declined stating that he has a similar brace at home.  Advised follow-up with me as needed if symptoms fail to improve.    Return in about 3 months (around 10/19/2024), or if symptoms worsen or fail to improve.  Patient  agreeable to call or return sooner for any concerns.

## 2024-07-23 ENCOUNTER — TELEPHONE (OUTPATIENT)
Dept: ORTHOPEDIC SURGERY | Facility: CLINIC | Age: 39
End: 2024-07-23
Payer: COMMERCIAL

## 2024-07-23 VITALS
SYSTOLIC BLOOD PRESSURE: 122 MMHG | WEIGHT: 252.2 LBS | HEIGHT: 70 IN | BODY MASS INDEX: 36.11 KG/M2 | DIASTOLIC BLOOD PRESSURE: 80 MMHG | TEMPERATURE: 97.5 F

## 2024-07-23 NOTE — TELEPHONE ENCOUNTER
Left  in regards to seeing where the patient would like to go for physical therapy. Patient was here Friday when [facility] was down. We was going to send the order to where he chooses for his convenience.

## 2025-07-22 ENCOUNTER — APPOINTMENT (OUTPATIENT)
Facility: HOSPITAL | Age: 40
End: 2025-07-22
Payer: OTHER MISCELLANEOUS

## 2025-07-22 ENCOUNTER — HOSPITAL ENCOUNTER (EMERGENCY)
Facility: HOSPITAL | Age: 40
Discharge: HOME | End: 2025-07-22
Attending: EMERGENCY MEDICINE | Admitting: EMERGENCY MEDICINE
Payer: OTHER MISCELLANEOUS

## 2025-07-22 VITALS
DIASTOLIC BLOOD PRESSURE: 95 MMHG | HEART RATE: 59 BPM | RESPIRATION RATE: 20 BRPM | TEMPERATURE: 98.3 F | BODY MASS INDEX: 34.93 KG/M2 | WEIGHT: 244 LBS | HEIGHT: 70 IN | SYSTOLIC BLOOD PRESSURE: 139 MMHG | OXYGEN SATURATION: 100 %

## 2025-07-22 DIAGNOSIS — M25.562 LEFT KNEE PAIN, UNSPECIFIED CHRONICITY: Primary | ICD-10-CM

## 2025-07-22 PROCEDURE — 99283 EMERGENCY DEPT VISIT LOW MDM: CPT | Performed by: EMERGENCY MEDICINE

## 2025-07-22 PROCEDURE — 73560 X-RAY EXAM OF KNEE 1 OR 2: CPT

## 2025-07-22 NOTE — DISCHARGE INSTRUCTIONS
Please return with worsening or changing symptoms.  Please use your knee brace at home.  Please follow-up with Ortho as soon as possible along with primary care.

## 2025-07-22 NOTE — [facility] PROVIDER NOTE
Subjective  History of Present Illness:    Patient is a 40-year-old male past medical history of patellar dislocation has had some intermittent problems with it since.  Patient states he was at work today and standing up next to machine when he feels like his knee Went off to the side causing him to lower himself into a chair.  Patient states he did not fall or hit the ground.  Patient states this occurs intermittently and sometimes he has to push the kneecap back over but did not have to do that today.  Patient states he is not sure if the knee went out of place today, it was painful and felt like it did but he is unsure.  Patient denies numbness/tingling.  Patient states the pain is improving from the initial incident earlier.  Patient denies decreased range of motion.      Nurses Notes reviewed and agree, including vitals, allergies, social history and prior medical history.     REVIEW OF SYSTEMS: All systems reviewed and not pertinent unless noted.  Review of Systems    Past Medical History:   Diagnosis Date    Anxiety     Asthma     AS CHILD    Dyspepsia     Dyspnea on exertion     Elevated LDL cholesterol level     Extensive tattoos     Fatigue     Morbid obesity        Allergies:    Patient has no known allergies.      Past Surgical History:   Procedure Laterality Date    DENTAL PROCEDURE      tooth pulled     ENDOSCOPY N/A 12/11/2020    Procedure: ESOPHAGOGASTRODUODENOSCOPY;  Surgeon: Vince Jean MD;  Location:  [facility];  Service: Bariatric;  Laterality: N/A;    GASTRIC SLEEVE LAPAROSCOPIC N/A 12/11/2020    Procedure: GASTRIC SLEEVE LAPAROSCOPIC;  Surgeon: Vince Jean MD;  Location:  [facility];  Service: Bariatric;  Laterality: N/A;    HIATAL HERNIA REPAIR N/A 12/11/2020    Procedure: HIATAL HERNIA REPAIR LAPAROSCOPIC;  Surgeon: Vince Jean MD;  Location:  [facility];  Service: Bariatric;  Laterality: N/A;    KNEE ARTHROSCOPY W/ MENISCECTOMY Right 7/28/2022    Procedure: Right Knee  "diagnostic arthroscopy with partial lateral meniscectomy, two compartment chondroplasty.;  Surgeon: Bernard Barber MD;  Location: [facility];  Service: Orthopedics;  Laterality: Right;         Social History     Socioeconomic History    Marital status:    Tobacco Use    Smoking status: Never     Passive exposure: Never    Smokeless tobacco: Never   Vaping Use    Vaping status: Never Used   Substance and Sexual Activity    Alcohol use: Yes     Alcohol/week: 1.0 standard drink of alcohol     Types: 1 Shots of liquor per week     Comment: socially    Drug use: Never    Sexual activity: Defer         Family History   Problem Relation Age of Onset    Hypertension Mother     Diabetes Mother     Obesity Mother     Sleep apnea Mother     Coronary artery disease Father     Hyperlipidemia Father     Hypertension Father     Heart attack Father     Heart disease Father     Drug abuse Brother     Hypertension Brother     COPD Maternal Grandmother     Obesity Maternal Grandmother     Diabetes Maternal Grandmother     Hypertension Maternal Grandmother     Heart disease Maternal Grandmother     Heart attack Maternal Grandmother     Kidney cancer Maternal Grandmother     Cancer Maternal Grandmother         Bladder Cancer    Hypertension Maternal Grandfather     Stroke Maternal Grandfather     Stroke Paternal Grandmother     Hypertension Paternal Grandmother     Stroke Paternal Grandfather     Hypertension Paternal Grandfather     Diabetes Paternal Grandfather        Objective  Physical Exam:  /95 (BP Location: Left arm, Patient Position: Sitting)   Pulse 59   Temp 98.3 °F (36.8 °C) (Oral)   Resp 20   Ht 177.8 cm (70\")   Wt 111 kg (244 lb)   SpO2 100%   BMI 35.01 kg/m²      Physical Exam  Constitutional:       Appearance: Well-developed. No acute distress  HENT:      Head: Normocephalic and atraumatic.      Mouth/Throat:      Mouth: Mucous membranes are moist.      Eyes:      Extraocular Movements: Extraocular " movements intact.   Cardiovascular:      Rate and Rhythm: Normal rate and regular rhythm.   Pulmonary:      Effort: Pulmonary effort is normal. No respiratory distress.   Musculoskeletal:         General: Tenderness to palpation lateral and medial left knee.  Flecked range of motion of left knee without difficulty.  2+ pulses left DP and PT.  No pain to palpation or edema noted of the calf.  No pain to palpation of femur, hip.      Extremities: Moves all 4s   Skin:     General: Skin is warm and dry.      Capillary Refill: Capillary refill takes less than 2 seconds.   Neurological:      Mental Status:Alert and oriented to person, place, and time.   Mentation is normal   Psychiatric:         Mood and Affect: Mood normal.         Behavior: Behavior normal.     Procedures    ED Course:         Lab Results (last 24 hours)       ** No results found for the last 24 hours. **             XR Knee 1 or 2 View Left  Result Date: 7/22/2025  XR KNEE 1 OR 2 VW LEFT Date of Exam: 7/22/2025 11:10 AM EDT Indication: injury Comparison: None available. Findings: No evidence of fracture or dislocation. No bony abnormality. No fluid in the suprapatellar bursa. Prepatellar soft tissue swelling     Impression: Impression: No evidence of fracture or significant joint effusion Electronically Signed: Prasanth Block  7/22/2025 11:46 AM EDT  Workstation ID: [id number]         [facility]      Initial impression of presenting illness: Left knee pain.  Patient was standing bearing weight at work when the left knee gave out on him which she states happens and the patella dislocates momentarily and then goes back into place.    DDX: includes but is not limited to: Patella tendinitis, strain, sprain, dislocation, avulsion fracture    Patient arrives comfortable, vital signs stable with vitals interpreted by myself.     Pertinent results: X-ray does not show any acute abnormalities besides some soft tissue swelling.    Diagnostic information from other  sources: Chart review    Interventions / Re-evaluation: Patient resting comfortably, states pain has improved    Medications - No data to display    Results/clinical rationale were discussed with patient    Consultations/Discussion of results with other physicians: attending    Data interpreted: Nursing notes reviewed, vital signs reviewed.  Labs independently interpreted by me     Counseling: Discussed the results above with the patient regarding need for admission or discharge.  Patient understands and agrees plan of care.  Discussed with patients the results from today's visit. Discussed with patient strict return precautions and they verbalize understanding. Recommend to them following up with primary care as soon as possible. Patient is discharged hemodynamically stable and comfortable.   Patient states he would like to see a different orthopedist than he saw last time, therefore additional orthopedist referral placed for patient.  Discussed with patient that the idea the orthopedist has of the physical therapy with likely benefit him and he says he is willing to try it.  Discussed he needs to follow back up with Ortho in order to continue with this.  Patient states he has knee brace and crutches at home he can use as needed.  Also discussed RICE therapy for patient and he is agreeable with this plan.    -----  ED Disposition       ED Disposition   Discharge    Condition   Stable    Comment   --             Final diagnoses:   Left knee pain, unspecified chronicity      Your Follow-Up Providers       Dian Leonard DO.    Specialty: Family Medicine  [address]  [address]  [place] [place] [address] [phone]               Bernard Barber MD .    Specialty: Orthopedic Surgery  [address]  [address]  [place] [place] [address] [phone]                       Contact information for after-discharge care    Follow-up information has not been specified.                    Your medication list         CONTINUE taking these medications        Instructions Last Dose Given Next Dose Due   CALCIUM 500 PO      Take 1 tablet by mouth Daily.       cholecalciferol 25 MCG (1000 UT) tablet  Commonly known as: VITAMIN D3      Take 1 tablet by mouth Daily.       diclofenac 75 MG EC tablet  Commonly known as: VOLTAREN      Take 1 tablet by mouth 2 (Two) Times a Day. Do not crush, split, or chew       IRON PO      Take 1 tablet by mouth Daily.       multivitamin with minerals tablet tablet      Take 1 tablet by mouth Daily.       promethazine-dextromethorphan 6.25-15 MG/5ML syrup  Commonly known as: PROMETHAZINE-DM      Take 5 mL by mouth 4 (Four) Times a Day As Needed for Cough.       propranolol 10 MG tablet  Commonly known as: INDERAL      Take 1 tablet by mouth 3 (Three) Times a Day As Needed (anxiety).

## (undated) DEVICE — 3M™ STERI-DRAPE™ U-DRAPE 1015: Brand: STERI-DRAPE™

## (undated) DEVICE — ENDOPATH XCEL BLADELESS TROCARS WITH STABILITY SLEEVES: Brand: ENDOPATH XCEL

## (undated) DEVICE — ENDOPATH XCEL UNIVERSAL TROCAR STABLILITY SLEEVES: Brand: ENDOPATH XCEL

## (undated) DEVICE — TBG ARTHSCP PUMP W CONN/REDUC 8FT

## (undated) DEVICE — PROXIMATE SKIN STAPLERS (35 WIDE) CONTAINS 35 STAINLESS STEEL STAPLES (FIXED HEAD): Brand: PROXIMATE

## (undated) DEVICE — Device

## (undated) DEVICE — GLV SURG SENSICARE PI MIC PF SZ8.5 LF STRL

## (undated) DEVICE — MARYLAND JAW LAPAROSCOPIC SEALER/DIVIDER COATED: Brand: LIGASURE

## (undated) DEVICE — CVR HNDL LIGHT RIGID

## (undated) DEVICE — FLTR PLUMEPORT LAP W/CONN STRL

## (undated) DEVICE — SHT AIR TRANSFR COMFRT GLIDE LAT 40X80IN

## (undated) DEVICE — POWER SHELL: Brand: SIGNIA

## (undated) DEVICE — PK BARIATRIC 10

## (undated) DEVICE — SLV SCD CALF HEMOFORCE DVT THERP REPROC MD

## (undated) DEVICE — Device: Brand: DEFENDO AIR/WATER/SUCTION AND BIOPSY VALVE

## (undated) DEVICE — GLV SURG SENSICARE SLT PF LF 8 STRL

## (undated) DEVICE — UNDRPD COMFRT GLD DRYPAD 36X57IN

## (undated) DEVICE — CONTN GRAD MEAS TRIANG 32OZ BLK

## (undated) DEVICE — T-DRAPE,EXTREMITY,STERILE: Brand: MEDLINE

## (undated) DEVICE — DRN PENRS 5/8X18IN LTX

## (undated) DEVICE — DRSNG SURG AQUACEL AG 9X15CM

## (undated) DEVICE — ENDOPATH 5MM ENDOSCOPIC BLUNT TIP DISSECTORS (12 POUCHES CONTAINING 3 DISSECTORS EACH): Brand: ENDOPATH

## (undated) DEVICE — 4.5 MM FULL RADIUS STRAIGHT                                    BLADES, POWER/EP-1, YELLOW, PACKAGED                                    6 PER BOX, STERILE: Brand: DYONICS

## (undated) DEVICE — APPL CHLORAPREP TINTED 26ML TEAL

## (undated) DEVICE — TROCAR: Brand: KII FIOS FIRST ENTRY

## (undated) DEVICE — GOWN,NON-REINFORCED,SIRUS,SET IN SLV,XXL: Brand: MEDLINE

## (undated) DEVICE — GLV SURG SENSICARE ORTHO PF LF 8 STRL

## (undated) DEVICE — APPL HEMOS FOR DELIVERY FLOSEAL

## (undated) DEVICE — CLMP STD 22CM DISP

## (undated) DEVICE — INTENDED USE FOR SURGICAL MARKING ON INTACT SKIN, ALSO PROVIDES A PERMANENT METHOD OF IDENTIFYING OBJECTS IN THE OPERATING ROOM: Brand: WRITESITE® REGULAR TIP SKIN MARKER

## (undated) DEVICE — TBG ARTHSCP PT W CONN/REDUC 8FT

## (undated) DEVICE — PK KN ARTHSCP 20

## (undated) DEVICE — [HIGH FLOW INSUFFLATOR,  DO NOT USE IF PACKAGE IS DAMAGED,  KEEP DRY,  KEEP AWAY FROM SUNLIGHT,  PROTECT FROM HEAT AND RADIOACTIVE SOURCES.]: Brand: PNEUMOSURE